# Patient Record
Sex: MALE | Race: WHITE | Employment: OTHER | URBAN - METROPOLITAN AREA
[De-identification: names, ages, dates, MRNs, and addresses within clinical notes are randomized per-mention and may not be internally consistent; named-entity substitution may affect disease eponyms.]

---

## 2020-12-12 ENCOUNTER — APPOINTMENT (OUTPATIENT)
Dept: GENERAL RADIOLOGY | Age: 70
DRG: 309 | End: 2020-12-12
Payer: COMMERCIAL

## 2020-12-12 ENCOUNTER — APPOINTMENT (OUTPATIENT)
Dept: CT IMAGING | Age: 70
DRG: 309 | End: 2020-12-12
Payer: COMMERCIAL

## 2020-12-12 ENCOUNTER — HOSPITAL ENCOUNTER (INPATIENT)
Age: 70
LOS: 2 days | Discharge: PSYCHIATRIC OR SUBSTANCE USE FACILITY | DRG: 309 | End: 2020-12-16
Attending: EMERGENCY MEDICINE | Admitting: INTERNAL MEDICINE
Payer: COMMERCIAL

## 2020-12-12 PROBLEM — R53.1 GENERALIZED WEAKNESS: Status: ACTIVE | Noted: 2020-12-12

## 2020-12-12 LAB
ALBUMIN SERPL-MCNC: 4.1 GM/DL (ref 3.4–5)
ALP BLD-CCNC: 65 IU/L (ref 40–129)
ALT SERPL-CCNC: 14 U/L (ref 10–40)
ANION GAP SERPL CALCULATED.3IONS-SCNC: 12 MMOL/L (ref 4–16)
AST SERPL-CCNC: 25 IU/L (ref 15–37)
BACTERIA: NEGATIVE /HPF
BASOPHILS ABSOLUTE: 0.1 K/CU MM
BASOPHILS RELATIVE PERCENT: 0.7 % (ref 0–1)
BILIRUB SERPL-MCNC: 0.8 MG/DL (ref 0–1)
BILIRUBIN URINE: NEGATIVE MG/DL
BLOOD, URINE: ABNORMAL
BUN BLDV-MCNC: 23 MG/DL (ref 6–23)
CALCIUM SERPL-MCNC: 8.8 MG/DL (ref 8.3–10.6)
CHLORIDE BLD-SCNC: 97 MMOL/L (ref 99–110)
CLARITY: CLEAR
CO2: 23 MMOL/L (ref 21–32)
COLOR: YELLOW
CREAT SERPL-MCNC: 1.4 MG/DL (ref 0.9–1.3)
DIFFERENTIAL TYPE: ABNORMAL
EOSINOPHILS ABSOLUTE: 0 K/CU MM
EOSINOPHILS RELATIVE PERCENT: 0.5 % (ref 0–3)
GFR AFRICAN AMERICAN: >60 ML/MIN/1.73M2
GFR NON-AFRICAN AMERICAN: 50 ML/MIN/1.73M2
GLUCOSE BLD-MCNC: 109 MG/DL (ref 70–99)
GLUCOSE, URINE: NEGATIVE MG/DL
HCT VFR BLD CALC: 37.4 % (ref 42–52)
HEMOGLOBIN: 12 GM/DL (ref 13.5–18)
IMMATURE NEUTROPHIL %: 0.4 % (ref 0–0.43)
KETONES, URINE: ABNORMAL MG/DL
LEUKOCYTE ESTERASE, URINE: NEGATIVE
LYMPHOCYTES ABSOLUTE: 1.1 K/CU MM
LYMPHOCYTES RELATIVE PERCENT: 14.9 % (ref 24–44)
MCH RBC QN AUTO: 26 PG (ref 27–31)
MCHC RBC AUTO-ENTMCNC: 32.1 % (ref 32–36)
MCV RBC AUTO: 81 FL (ref 78–100)
MONOCYTES ABSOLUTE: 0.7 K/CU MM
MONOCYTES RELATIVE PERCENT: 9.5 % (ref 0–4)
MUCUS: ABNORMAL HPF
NITRITE URINE, QUANTITATIVE: NEGATIVE
NUCLEATED RBC %: 0 %
PDW BLD-RTO: 16 % (ref 11.7–14.9)
PH, URINE: 5 (ref 5–8)
PLATELET # BLD: 231 K/CU MM (ref 140–440)
PMV BLD AUTO: 10.1 FL (ref 7.5–11.1)
POTASSIUM SERPL-SCNC: 4.2 MMOL/L (ref 3.5–5.1)
PROTEIN UA: 30 MG/DL
RBC # BLD: 4.62 M/CU MM (ref 4.6–6.2)
RBC URINE: 1 /HPF (ref 0–3)
SARS-COV-2, NAAT: NOT DETECTED
SEGMENTED NEUTROPHILS ABSOLUTE COUNT: 5.6 K/CU MM
SEGMENTED NEUTROPHILS RELATIVE PERCENT: 74 % (ref 36–66)
SODIUM BLD-SCNC: 132 MMOL/L (ref 135–145)
SOURCE: NORMAL
SPECIFIC GRAVITY UA: 1.02 (ref 1–1.03)
TOTAL IMMATURE NEUTOROPHIL: 0.03 K/CU MM
TOTAL NUCLEATED RBC: 0 K/CU MM
TOTAL PROTEIN: 7.2 GM/DL (ref 6.4–8.2)
TRICHOMONAS: ABNORMAL /HPF
TROPONIN T: <0.01 NG/ML
UROBILINOGEN, URINE: NORMAL MG/DL (ref 0.2–1)
WBC # BLD: 7.6 K/CU MM (ref 4–10.5)
WBC UA: 1 /HPF (ref 0–2)

## 2020-12-12 PROCEDURE — 93005 ELECTROCARDIOGRAM TRACING: CPT | Performed by: EMERGENCY MEDICINE

## 2020-12-12 PROCEDURE — 93005 ELECTROCARDIOGRAM TRACING: CPT | Performed by: INTERNAL MEDICINE

## 2020-12-12 PROCEDURE — G0378 HOSPITAL OBSERVATION PER HR: HCPCS

## 2020-12-12 PROCEDURE — 96361 HYDRATE IV INFUSION ADD-ON: CPT

## 2020-12-12 PROCEDURE — 81001 URINALYSIS AUTO W/SCOPE: CPT

## 2020-12-12 PROCEDURE — 96372 THER/PROPH/DIAG INJ SC/IM: CPT

## 2020-12-12 PROCEDURE — 96365 THER/PROPH/DIAG IV INF INIT: CPT

## 2020-12-12 PROCEDURE — 96376 TX/PRO/DX INJ SAME DRUG ADON: CPT

## 2020-12-12 PROCEDURE — 96360 HYDRATION IV INFUSION INIT: CPT

## 2020-12-12 PROCEDURE — 99283 EMERGENCY DEPT VISIT LOW MDM: CPT

## 2020-12-12 PROCEDURE — 2500000003 HC RX 250 WO HCPCS: Performed by: PHYSICIAN ASSISTANT

## 2020-12-12 PROCEDURE — 2580000003 HC RX 258: Performed by: EMERGENCY MEDICINE

## 2020-12-12 PROCEDURE — 6360000002 HC RX W HCPCS: Performed by: INTERNAL MEDICINE

## 2020-12-12 PROCEDURE — 51701 INSERT BLADDER CATHETER: CPT

## 2020-12-12 PROCEDURE — 85025 COMPLETE CBC W/AUTO DIFF WBC: CPT

## 2020-12-12 PROCEDURE — 80053 COMPREHEN METABOLIC PANEL: CPT

## 2020-12-12 PROCEDURE — 71045 X-RAY EXAM CHEST 1 VIEW: CPT

## 2020-12-12 PROCEDURE — 70450 CT HEAD/BRAIN W/O DYE: CPT

## 2020-12-12 PROCEDURE — 6370000000 HC RX 637 (ALT 250 FOR IP): Performed by: INTERNAL MEDICINE

## 2020-12-12 PROCEDURE — 84484 ASSAY OF TROPONIN QUANT: CPT

## 2020-12-12 PROCEDURE — 94761 N-INVAS EAR/PLS OXIMETRY MLT: CPT

## 2020-12-12 PROCEDURE — 2580000003 HC RX 258: Performed by: PHYSICIAN ASSISTANT

## 2020-12-12 PROCEDURE — U0002 COVID-19 LAB TEST NON-CDC: HCPCS

## 2020-12-12 PROCEDURE — 96375 TX/PRO/DX INJ NEW DRUG ADDON: CPT

## 2020-12-12 PROCEDURE — 2580000003 HC RX 258: Performed by: INTERNAL MEDICINE

## 2020-12-12 RX ORDER — SODIUM CHLORIDE 0.9 % (FLUSH) 0.9 %
10 SYRINGE (ML) INJECTION EVERY 12 HOURS SCHEDULED
Status: DISCONTINUED | OUTPATIENT
Start: 2020-12-12 | End: 2020-12-16 | Stop reason: HOSPADM

## 2020-12-12 RX ORDER — HYDRALAZINE HYDROCHLORIDE 20 MG/ML
10 INJECTION INTRAMUSCULAR; INTRAVENOUS EVERY 4 HOURS PRN
Status: DISCONTINUED | OUTPATIENT
Start: 2020-12-12 | End: 2020-12-12 | Stop reason: CLARIF

## 2020-12-12 RX ORDER — AMLODIPINE BESYLATE 5 MG/1
5 TABLET ORAL DAILY
Status: DISCONTINUED | OUTPATIENT
Start: 2020-12-13 | End: 2020-12-14

## 2020-12-12 RX ORDER — POTASSIUM CHLORIDE 7.45 MG/ML
10 INJECTION INTRAVENOUS PRN
Status: DISCONTINUED | OUTPATIENT
Start: 2020-12-12 | End: 2020-12-16 | Stop reason: HOSPADM

## 2020-12-12 RX ORDER — SODIUM CHLORIDE 0.9 % (FLUSH) 0.9 %
10 SYRINGE (ML) INJECTION PRN
Status: DISCONTINUED | OUTPATIENT
Start: 2020-12-12 | End: 2020-12-16 | Stop reason: HOSPADM

## 2020-12-12 RX ORDER — POTASSIUM CHLORIDE 20 MEQ/1
40 TABLET, EXTENDED RELEASE ORAL PRN
Status: DISCONTINUED | OUTPATIENT
Start: 2020-12-12 | End: 2020-12-16 | Stop reason: HOSPADM

## 2020-12-12 RX ORDER — ACETAMINOPHEN 650 MG/1
650 SUPPOSITORY RECTAL EVERY 6 HOURS PRN
Status: DISCONTINUED | OUTPATIENT
Start: 2020-12-12 | End: 2020-12-16 | Stop reason: HOSPADM

## 2020-12-12 RX ORDER — MAGNESIUM SULFATE IN WATER 40 MG/ML
2 INJECTION, SOLUTION INTRAVENOUS PRN
Status: DISCONTINUED | OUTPATIENT
Start: 2020-12-12 | End: 2020-12-16 | Stop reason: HOSPADM

## 2020-12-12 RX ORDER — POLYETHYLENE GLYCOL 3350 17 G/17G
17 POWDER, FOR SOLUTION ORAL DAILY PRN
Status: DISCONTINUED | OUTPATIENT
Start: 2020-12-12 | End: 2020-12-16 | Stop reason: HOSPADM

## 2020-12-12 RX ORDER — ACETAMINOPHEN 325 MG/1
650 TABLET ORAL EVERY 6 HOURS PRN
Status: DISCONTINUED | OUTPATIENT
Start: 2020-12-12 | End: 2020-12-16 | Stop reason: HOSPADM

## 2020-12-12 RX ORDER — ONDANSETRON 2 MG/ML
4 INJECTION INTRAMUSCULAR; INTRAVENOUS EVERY 6 HOURS PRN
Status: DISCONTINUED | OUTPATIENT
Start: 2020-12-12 | End: 2020-12-16 | Stop reason: HOSPADM

## 2020-12-12 RX ORDER — SODIUM CHLORIDE 9 MG/ML
INJECTION, SOLUTION INTRAVENOUS CONTINUOUS
Status: ACTIVE | OUTPATIENT
Start: 2020-12-12 | End: 2020-12-13

## 2020-12-12 RX ORDER — PROMETHAZINE HYDROCHLORIDE 12.5 MG/1
12.5 TABLET ORAL EVERY 6 HOURS PRN
Status: DISCONTINUED | OUTPATIENT
Start: 2020-12-12 | End: 2020-12-16 | Stop reason: HOSPADM

## 2020-12-12 RX ORDER — 0.9 % SODIUM CHLORIDE 0.9 %
500 INTRAVENOUS SOLUTION INTRAVENOUS ONCE
Status: COMPLETED | OUTPATIENT
Start: 2020-12-12 | End: 2020-12-12

## 2020-12-12 RX ADMIN — HYDRALAZINE HYDROCHLORIDE 10 MG: 20 INJECTION INTRAMUSCULAR; INTRAVENOUS at 19:18

## 2020-12-12 RX ADMIN — SODIUM CHLORIDE 500 ML: 9 INJECTION, SOLUTION INTRAVENOUS at 13:46

## 2020-12-12 RX ADMIN — METOPROLOL TARTRATE 25 MG: 25 TABLET, FILM COATED ORAL at 23:05

## 2020-12-12 RX ADMIN — SODIUM CHLORIDE: 9 INJECTION, SOLUTION INTRAVENOUS at 21:04

## 2020-12-12 RX ADMIN — METOPROLOL TARTRATE 5 MG: 5 INJECTION INTRAVENOUS at 21:50

## 2020-12-12 RX ADMIN — ENOXAPARIN SODIUM 40 MG: 40 INJECTION SUBCUTANEOUS at 21:06

## 2020-12-12 ASSESSMENT — PAIN SCALES - GENERAL
PAINLEVEL_OUTOF10: 1
PAINLEVEL_OUTOF10: 0

## 2020-12-12 NOTE — ED PROVIDER NOTES
EMERGENCY DEPARTMENT ENCOUNTER    Patient: Eleuterio Muhammad  MRN: [id number]  : 1950  Date of Evaluation: 2020  ED Provider:  Victor M Bowie    CHIEF COMPLAINT  Chief Complaint   Patient presents with   Mara LOPEZ  Eleuterio Muhammad is a 65 y.o. male who presents with generalized body aches with generalized weakness and fatigue over the last several days he also had a fall yesterday after tripping. States he hit his head on the wall but did not have any loss consciousness. Denies being on any blood thinners. States he just generally does not feel well but does not give any specific symptoms or complaints. He denies headache, denies chest pain, abdominal pain, nausea, vomiting, diarrhea, constipation, fever, shortness of breath. Denies any other associated symptoms or complaints or concerns. REVIEW OF SYSTEMS    Constitutional: negative for fever, chills  Neurological: negative for HA, focal weakness, loss of sensation  Ophthalmic: negative for vision change  ENT: negative for congestion, rhinorrhea  Cardiovascular: negative for chest pain  Respiratory: negative for SOB, cough  GI: negative for abdominal pain, nausea, vomiting, diarrhea, constipation  : negative for dysuria, hematuria  Musculoskeletal: negative for decreased ROM, joint swelling  Dermatological: negative for rash, wounds  Heme: Negative for bleeding, bruising      PAST MEDICAL HISTORY  History reviewed. No pertinent past medical history. CURRENT MEDICATIONS  [unfilled]    ALLERGIES  No Known Allergies    SURGICAL HISTORY  History reviewed. No pertinent surgical history. FAMILY HISTORY  History reviewed. No pertinent family history.     SOCIAL HISTORY  Social History     Socioeconomic History    Marital status:      Spouse name: None    Number of children: None    Years of education: None    Highest education level: None   Occupational History    None   Social Needs    Financial resource strain: None    Food insecurity     Worry: None     Inability: None    Transportation needs     Medical: None     Non-medical: None   Tobacco Use    Smoking status: Never Smoker    Smokeless tobacco: Never Used   Substance and Sexual Activity    Alcohol use: None    Drug use: None    Sexual activity: None   Lifestyle    Physical activity     Days per week: None     Minutes per session: None    Stress: None   Relationships    Social connections     Talks on phone: None     Gets together: None     Attends Anglican service: None     Active member of club or organization: None     Attends meetings of clubs or organizations: None     Relationship status: None    Intimate partner violence     Fear of current or ex partner: None     Emotionally abused: None     Physically abused: None     Forced sexual activity: None   Other Topics Concern    None   Social History Narrative    None         **Past medical, family and social histories, and nursing notes reviewed and verified by me**      PHYSICAL EXAM  VITAL SIGNS:   ED Triage Vitals [12/12/20 1145]   Enc Vitals Group      BP (!) 176/105      Pulse 66      Resp 19      Temp 98.2 °F (36.8 °C)      Temp Source Oral      SpO2 96 %      Weight 150 lb (68 kg)      Height 5' 10\" (1.778 m)      Head Circumference       Peak Flow       Pain Score       Pain Loc       Pain Edu? Excl. in [address]? Vitals during ED course were reviewed and are as charted.     Constitutional: Minimal distress, Non-toxic appearance  Eyes: Conjunctiva normal, No discharge, PERRL, EOMI  HENT: Normocephalic, Atraumatic, bilateral external ears normal, posterior oropharynx is nonerythematous and without exudate, uvula is midline, no trismus, no \"hot potato voice\" or dysphonia, oropharynx moist  Neck: Supple, no midline cervical spinal tenderness palpation or palpable deformities, no stridor, no grossly visible or palpable masses  Cardiovascular: Regular rate and rhythm, No murmurs, No rubs, No gallops  Pulmonary/Chest: Normal breath sounds, No respiratory distress or accessory muscle use, No wheezing, crackles or rhonchi. Abdomen: Soft, nondistended and nonrigid, No tenderness or peritoneal signs, No masses, normal bowel sounds  Back: No midline point tenderness, No paraspinous muscle tenderness.  No CVA tenderness  Extremities: No gross deformities, no edema, no tenderness  Neurologic: Cranial nerves II through XII grossly intact as tested, normal motor function, Normal sensory function, No focal deficits  Skin: Warm, Dry, No erythema, No rash, No cyanosis, No mottling  Lymphatic: No lymphadenopathy in the following location(s): cervical  Psychiatric: Alert and oriented x3, Affect normal              RADIOLOGY/PROCEDURES/LABS/MEDICATIONS ADMINISTERED:    I have reviewed and interpreted all of the currently available lab results from this visit (if applicable):  Results for orders placed or performed during the hospital encounter of 12/12/20   CBC Auto Differential   Result Value Ref Range    WBC 7.6 4.0 - 10.5 K/CU MM    RBC 4.62 4.6 - 6.2 M/CU MM    Hemoglobin 12.0 (L) 13.5 - 18.0 GM/DL    Hematocrit 37.4 (L) 42 - 52 %    MCV 81.0 78 - 100 FL    MCH 26.0 (L) 27 - 31 PG    MCHC 32.1 32.0 - 36.0 %    RDW 16.0 (H) 11.7 - 14.9 %    Platelets 779 450 - 408 K/CU MM    MPV 10.1 7.5 - 11.1 FL    Differential Type AUTOMATED DIFFERENTIAL     Segs Relative 74.0 (H) 36 - 66 %    Lymphocytes % 14.9 (L) 24 - 44 %    Monocytes % 9.5 (H) 0 - 4 %    Eosinophils % 0.5 0 - 3 %    Basophils % 0.7 0 - 1 %    Segs Absolute 5.6 K/CU MM    Lymphocytes Absolute 1.1 K/CU MM    Monocytes Absolute 0.7 K/CU MM    Eosinophils Absolute 0.0 K/CU MM    Basophils Absolute 0.1 K/CU MM    Nucleated RBC % 0.0 %    Total Nucleated RBC 0.0 K/CU MM    Total Immature Neutrophil 0.03 K/CU MM    Immature Neutrophil % 0.4 0 - 0.43 %   Comprehensive Metabolic Panel w/ Reflex to MG   Result Value Ref Range    Sodium 132 (L) 135 - 145 MMOL/L Non-African American 60 (L) >60 mL/min/1.73m2    GFR African American >60 >60 mL/min/1.73m2    Anion Gap 11 4 - 16   CBC auto differential   Result Value Ref Range    WBC 8.8 4.0 - 10.5 K/CU MM    RBC 4.21 (L) 4.6 - 6.2 M/CU MM    Hemoglobin 10.6 (L) 13.5 - 18.0 GM/DL    Hematocrit 34.3 (L) 42 - 52 %    MCV 81.5 78 - 100 FL    MCH 25.2 (L) 27 - 31 PG    MCHC 30.9 (L) 32.0 - 36.0 %    RDW 16.1 (H) 11.7 - 14.9 %    Platelets 766 649 - 671 K/CU MM    MPV 10.0 7.5 - 11.1 FL    Differential Type AUTOMATED DIFFERENTIAL     Segs Relative 73.1 (H) 36 - 66 %    Lymphocytes % 15.9 (L) 24 - 44 %    Monocytes % 9.4 (H) 0 - 4 %    Eosinophils % 0.7 0 - 3 %    Basophils % 0.6 0 - 1 %    Segs Absolute 6.5 K/CU MM    Lymphocytes Absolute 1.4 K/CU MM    Monocytes Absolute 0.8 K/CU MM    Eosinophils Absolute 0.1 K/CU MM    Basophils Absolute 0.1 K/CU MM    Nucleated RBC % 0.0 %    Total Nucleated RBC 0.0 K/CU MM    Total Immature Neutrophil 0.03 K/CU MM    Immature Neutrophil % 0.3 0 - 0.43 %   TSH without Reflex   Result Value Ref Range    TSH, High Sensitivity 0.327 0.270 - 4.20 uIu/ml   EKG 12 Lead   Result Value Ref Range    Ventricular Rate 84 BPM    Atrial Rate 92 BPM    QRS Duration 86 ms    Q-T Interval 374 ms    QTc Calculation (Bazett) 441 ms    R Axis -6 degrees    T Axis 44 degrees    Diagnosis       Atrial fibrillation  Abnormal ECG  No previous ECGs available  Confirmed by Meryl Gale MD ([id number]) on 12/13/2020 2:08:42 PM     EKG 12 Lead   Result Value Ref Range    Ventricular Rate 88 BPM    Atrial Rate 288 BPM    QRS Duration 88 ms    Q-T Interval 364 ms    QTc Calculation (Bazett) 440 ms    R Axis -9 degrees    T Axis 37 degrees    Diagnosis       Atrial fibrillation  Abnormal ECG  When compared with ECG of 12-DEC-2020 13:17,  No significant change was found  Confirmed by Anisa Gale MDine Ditch [id number]) on 12/13/2020 2:09:59 PM            ABNORMAL LABS:  Labs Reviewed   CBC WITH AUTO DIFFERENTIAL - Abnormal; Notable for the following components:       Result Value    Hemoglobin 12.0 (*)     Hematocrit 37.4 (*)     MCH 26.0 (*)     RDW 16.0 (*)     Segs Relative 74.0 (*)     Lymphocytes % 14.9 (*)     Monocytes % 9.5 (*)     All other components within normal limits   COMPREHENSIVE METABOLIC PANEL W/ REFLEX TO MG FOR LOW K - Abnormal; Notable for the following components:    Sodium 132 (*)     Chloride 97 (*)     CREATININE 1.4 (*)     Glucose 109 (*)     GFR Non- 50 (*)     All other components within normal limits   URINE RT REFLEX TO CULTURE - Abnormal; Notable for the following components:    Ketones, Urine SMALL (*)     Blood, Urine SMALL (*)     Protein, UA 30 (*)     Mucus, UA RARE (*)     All other components within normal limits   COMPREHENSIVE METABOLIC PANEL W/ REFLEX TO MG FOR LOW K - Abnormal; Notable for the following components:    Calcium 8.1 (*)     Total Protein 5.9 (*)     GFR Non- 60 (*)     All other components within normal limits   CBC WITH AUTO DIFFERENTIAL - Abnormal; Notable for the following components:    RBC 4.21 (*)     Hemoglobin 10.6 (*)     Hematocrit 34.3 (*)     MCH 25.2 (*)     MCHC 30.9 (*)     RDW 16.1 (*)     Segs Relative 73.1 (*)     Lymphocytes % 15.9 (*)     Monocytes % 9.4 (*)     All other components within normal limits   TROPONIN   COVID-19   TSH WITHOUT REFLEX   TROPONIN         IMAGING STUDIES ORDERED:  CT HEAD WO CONTRAST  XR CHEST PORTABLE  STRAIGHT CATH  AMBULATE PATIENT  VITAL SIGNS  VITAL SIGNS  TELEMETRY MONITORING  IP CONSULT TO HOSPITALIST  IP CONSULT TO PSYCHIATRY  IP CONSULT TO CARDIOLOGY  PATIENT STATUS (FROM ED OR OR/PROCEDURAL)  REASON FOR NO MECHANICAL VTE PROPHYLAXIS  FULL CODE  DIET GENERAL  OT EVAL AND TREAT  PT EVAL AND TREAT  ACTIVITY AS TOLERATED    CT Head WO Contrast   Final Result   No acute intracranial abnormality. Old lacunar infarct in the right head of caudate nucleus.       Mild parenchymal volume loss.      Mild chronic microvascular disease. 2 mm calcific nodule at the anterior aspect of the 3rd ventricle, likely   secondary to calcification related to choroid plexus versus calcified colloid   cyst.  No hydrocephalus.          XR CHEST PORTABLE   Final Result   Cardiomegaly, otherwise, no acute abnormalities seen in the chest               MEDICATIONS ADMINISTERED:  Medications   sodium chloride flush 0.9 % injection 10 mL (0 mLs Intravenous Held 12/13/20 2146)   sodium chloride flush 0.9 % injection 10 mL (has no administration in time range)   enoxaparin (LOVENOX) injection 40 mg (40 mg Subcutaneous Given 12/13/20 0920)   promethazine (PHENERGAN) tablet 12.5 mg (has no administration in time range)     Or   ondansetron (ZOFRAN) injection 4 mg (has no administration in time range)   polyethylene glycol (GLYCOLAX) packet 17 g (has no administration in time range)   acetaminophen (TYLENOL) tablet 650 mg (has no administration in time range)     Or   acetaminophen (TYLENOL) suppository 650 mg (has no administration in time range)   0.9 % sodium chloride infusion ( Intravenous Paused 12/13/20 1854)   potassium chloride (KLOR-CON M) extended release tablet 40 mEq (has no administration in time range)     Or   potassium bicarb-citric acid (EFFER-K) effervescent tablet 40 mEq (has no administration in time range)     Or   potassium chloride 10 mEq/100 mL IVPB (Peripheral Line) (has no administration in time range)   magnesium sulfate 2 g in 50 mL IVPB premix (has no administration in time range)   hydrALAZINE (APRESOLINE) 10 mg in sodium chloride 0.9 % 50 mL ivpb (0 mg Intravenous Stopped 12/13/20 0057)   metoprolol tartrate (LOPRESSOR) tablet 25 mg (25 mg Oral Given 12/13/20 2146)   amLODIPine (NORVASC) tablet 5 mg (5 mg Oral Given 12/13/20 0920)   0.9 % sodium chloride bolus (0 mLs Intravenous Stopped 12/12/20 1552)   metoprolol (LOPRESSOR) 5 mg in sodium chloride 0.9 % 50 mL IVPB (0 mg Intravenous Stopped 12/12/20 2220)         COURSE & MEDICAL DECISION MAKING  Last vitals: BP (!) 169/78   Pulse 76   Temp 98.3 °F (36.8 °C) (Oral)   Resp 18   Ht 5' 10\" (1.778 m)   Wt 179 lb (81.2 kg)   SpO2 98%   BMI 25.68 kg/m²     65year-old male generalized weakness and fall. No neurological deficits. CT scan of the head was obtained and is without any acute abnormalities. Laboratory work-up thus far has been rather unremarkable. Urinalysis still pending. Patient signed out to the evening attending, Dr. Stef Mott who will follow up on this and determine final disposition. Clinical Impression:  1. Generalized weakness    2. Fall, initial encounter    3. Closed head injury, initial encounter    4. Essential hypertension    5. Tachycardia        Disposition referral (if applicable):  No follow-up provider specified. Disposition medications (if applicable): There are no discharge medications for this patient. ED Provider Disposition Time  DISPOSITION            Electronically signed by: Wili Pritchett M.D., 12/13/2020 11:49 PM      This dictation was created with voice recognition software. While attempts have been made to review the dictation as it is transcribed, on occasion the spoken word can be misinterpreted by the technology leading to omissions or inappropriate words, phrases or sentences.         Rosalie Bennett MD  12/13/20 [id number]

## 2020-12-12 NOTE — ED TRIAGE NOTES
Pt presents to the ED after falling yesterday. Pt states he hit his head. Denies any LOC or blood thinning medications.

## 2020-12-12 NOTE — H&P
[facility] History & Physical      Name: Charles Carreno  PCP: No primary care provider on file. Date of Admission: 12/12/2020    Date of Service: Pt seen/examined on 12/12/2020     Chief Complaint:  Fall    History Of Present Illness:      Charles Carreno is a 65 y.o. male that  has no past medical history on file., who presented to ED with the above chief complaint. Patient noted a fall yesterday, denied any symptoms of chest pain , SOB prior to this. Patient is poor historian, kept saying though interview\" I am going to die, what is the point of all these? \" kept saying death is near , would not say why he felt so. He denies any suicidal ideation or homicidal ideation. Patient notes has been very generalized weakness and fatigue in the past couple of days, denies an fever or chills, no chest pain, palpitations , dizziness, cough or shortness of breath, no nausea, vomiting or urinary symptoms. ED course summary:   The case was discussed with the ED provider  Allergies:  Patient has no known allergies. Medications Prior to Admission Reviewed:    Prior to Admission medications    Not on File       Past Medical History Reviewed:    No known past medical history  Past Surgical History Reviewed:    Patient  has no past surgical history on file. Family History:  Reviewed in detail. Positive as follows:  History reviewed. No pertinent family history. Social History:  The patient  reports that he has never smoked. He has never used smokeless tobacco.  TOBACCO:   reports that he has never smoked. He has never used smokeless tobacco.  ETOH:  No alcohol use  Drugs: Denies recreational drug use    ROS:  At least 10-point review of systems reviewed and were negative except as stated above in HPI. Physical Exam:  Vitals:  BP (!) 208/100   Pulse 109   Temp 98.2 °F (36.8 °C) (Oral)   Resp 17   Ht 5' 10\" (1.778 m)   Wt 150 lb (68 kg)   SpO2 96%   BMI 21.52 kg/m²   General: well appearing, NAD. normalbody habitus. Eyes: EDITA. Normal conjunctiva. extraocular motors are intact, sclerae white, no injection, no icterus. ENT/Mouth: Normal appearing jaw and neck, no cervical lymphadenopathy or sinus tenderness. Clear oropharynx with moist mucous membrane. Cardiovascular:  normal rate, regular rhythm, normal S1, S2, no murmurs, rubs, clicks or gallops. There is no tenderness to palpation over the chest wall or over ribs. No peripheral edema. Pedal pulses 2+ bilaterally. Respiratory:CTA, no wheezes, rales or rhonchi, symmetric air movement. There is no use of accessory muscles no nasal flaring identified. Gastrointestinal: Soft, nontender, nondistended, no masses or organomegaly. Suprapubic: there is no tenderness to palpation over the external bladder    Genitourinary:  No CVA tenderness. Musculoskletal: No joint swelling, warmth, or tenderness. 5 out of 5 strength in all 4 extremities. No obvious muscle atrophy is noted. No focal muscle deficits are appreciated  Skin: Warm, Dry, Normal coloration and turgor, no rashes, no suspicious skin lesions noted. Neurologic: Normal speech, No focal findings or movement disorder noted. No evidence of incontinence or loss of bowel or bladder.   Mental status:  Level of consciousness is normal. Alert, Oriented x3, Coherent, Appropriate affect, No agitation. Hematologic/Lymphatic: No cervical lymphadenopathy. Data  (4-points for high level)  Laboratory this visit:  Reviewed  Recent Labs     12/12/20  1330   WBC 7.6   HGB 12.0*   HCT 37.4*         Recent Labs     12/12/20  1330   *   K 4.2   CL 97*   CO2 23   BUN 23   CREATININE 1.4*     Recent Labs     12/12/20  1330   AST 25   ALT 14   BILITOT 0.8   ALKPHOS 65     No results for input(s): INR in the last 72 hours. No results for input(s): CKTOTAL, CKMB, CKMBINDEX in the last 72 hours. Invalid input(s): Maci Monday input(s): PRO-BNP      Radiology this visit:  Reviewed.     Ct Head Wo Contrast    Result Date: 12/12/2020  EXAMINATION: CT OF THE HEAD WITHOUT CONTRAST  12/12/2020 2:34 pm TECHNIQUE: CT of the head was performed without the administration of intravenous contrast. Dose modulation, iterative reconstruction, and/or weight based adjustment of the mA/kV was utilized to reduce the radiation dose to as low as reasonably achievable. COMPARISON: None. HISTORY: ORDERING SYSTEM PROVIDED HISTORY: fall, head injury TECHNOLOGIST PROVIDED HISTORY: Reason for exam:->fall, head injury Has a \"code stroke\" or \"stroke alert\" been called? ->No Reason for Exam: fall, head injury, AMS Acuity: Acute Type of Exam: Initial FINDINGS: BRAIN/VENTRICLES: There is mild parenchymal volume loss. There is periventricular white matter low attenuation, likely related to mild chronic microvascular disease. There is no acute intracranial hemorrhage, mass effect or midline shift. No abnormal extra-axial fluid collection. There is old lacunar infarct in the right head of caudate nucleus. The gray-white differentiation is maintained without evidence of an acute infarct. There is 2 mm calcific nodule at the anterior aspect of the 3rd ventricle, likely related to calcification related to choroid plexus versus calcified colloid cyst.  No hydrocephalus. ORBITS: The visualized portion of the orbits demonstrate no acute abnormality. SINUSES: The visualized paranasal sinuses and mastoid air cells demonstrate no acute abnormality. SOFT TISSUES/SKULL:  No acute abnormality of the visualized skull or soft tissues. No acute intracranial abnormality. Old lacunar infarct in the right head of caudate nucleus. Mild parenchymal volume loss. Mild chronic microvascular disease. 2 mm calcific nodule at the anterior aspect of the 3rd ventricle, likely secondary to calcification related to choroid plexus versus calcified colloid cyst.  No hydrocephalus.      Xr Chest Portable    Result Date: 12/12/2020  EXAMINATION: ONE XRAY VIEW OF THE CHEST 12/12/2020 1:04 pm COMPARISON: None. HISTORY: ORDERING SYSTEM PROVIDED HISTORY: fall, generalized weakness TECHNOLOGIST PROVIDED HISTORY: Reason for exam:->fall, generalized weakness Reason for Exam: fall, generalized weakness Acuity: Acute Type of Exam: Initial FINDINGS: Cardiomegaly. No acute airspace disease. No pulmonary vascular congestion or edema. No pneumothorax. No pleural effusion. Cardiomegaly, otherwise, no acute abnormalities seen in the chest       EKG this visit:  Reviewed   EKG: Afib, HR -88    Documents: Recent previous records, labs, imaging in the EMR were reviewed. Assessments and Plans:  Present on Admission:   Generalized weakness    · Generalised weakness  -Work up so far negative  -Labs unremarkable, mild hypokalemia and GENNA  -CT head negative  -Chest xray negative  -UA negative  -Covid negative    Plan   -Admit to telemetry  -PT OT    · Hypertensive urgency  -Elevated BP, not noted to be hypertensive  -Not on home meds  -Start on hydralazine PRN  -Start on metoprolol and amlodipine , titrate as needed    · ?? New onset Afib, slow ventricular response  -EKG afib  -Order echo, start on metoprolol  -Order TSH    · GENNA  · Hyponatremia, likely hypovolemic  -Hydrate, monitor    · ?? Depression  -Consult psych      DVT Prophylaxis: Enoxaparin  Code Status Full, will revaluate , not sure state of mind patient is in.       Lavina Lesch, MD [facility]  Office Phone: [phone]

## 2020-12-12 NOTE — CARE COORDINATION
LSW completed chart review. This pt here for fall which occurred yesterday. Pt did have BP reading of 176/105. Pt has also been tested for Covid.      LSW spoke to this pt from door wearing mask/PPE and explained CM role. Pt reports he lives alone w/in a 1 story apartment- for last 34 yrs. Of note, this is pt's 1st visit to [facility]. Pt notes he is  and does not drive. Pt explains his brother Jessica Dasilva ([email or web address]) brought him here. Pt does not have a PCP and states he does not go to the doctor. Pt not interested in PCP list but LSW provided this to pt along w/# for [facility] transportation for medical appointments. Pt has insurance and takes no medication. Pt has no DME. LSW completed ACP w/pt. Pt states he is ready to die and feels he is close to death- no reason given. LSW also stressed importance for pt to secure PCP so he can have a DNR order written as this is his wish. Pt was very clear he wants no Resuscitation or Ventilation. Patient ambulated down cope without difficulty. EKG shows new onset of A-fib, otherwise labs & XR non-significant, which includes CT head. Pt is being admitted- LSW did send sticky note to hospitalist that pt wants to be DNR.

## 2020-12-12 NOTE — ACP (ADVANCE CARE PLANNING)
Advance Care Planning     Advance Care Planning Activator (Inpatient)  Conversation Note      Date of ACP Conversation: 12/12/2020    Conversation Conducted with: Patient with Decision Making Capacity    ACP Activator: Anamaria Ford    *When Decision Maker makes decisions on behalf of the incapacitated patient: Decision Maker is asked to consider and make decisions based on patient values, known preferences, or best interests. Health Care Decision Maker:     Current Designated Health Care Decision Maker:     (If there is a valid [address] named in the [address] [place] [address] Makers\" box in the ACP activity, but it is not visible above, be sure to open that field and then select the health care decision maker relationship (ie \"primary\") in the blank space to the right of the name.) Validate  this information as still accurate & up-to-date; edit [address] field as needed.)    Note: Assess and validate information in current ACP documents, as indicated. If no Decision Maker listed above or available through scanned documents, then:    If no Authorized Decision Maker has previously been identified, then patient chooses [address]:  \"Who would you like to name as your primary health care decision-maker? \"               Name: Shraddha Key  Relationship: Brother  Phone number: [phone]  Valeta Ala this person be reached easily? \" No     \"Who would you like to name as your back-up decision maker? \"   Name: Matilde Edwards Relationship: brother Phone number: [phone]  Valeta Ala this person be reached easily? \" No    LSW noted importance for pt to notify the hospital of # for both of above contacts asap. Note: If the relationship of these Decision-Makers to the patient does NOT follow your state's Next of Kin hierarchy, recommend that patient complete ACP document that meets state-specific requirements to allow them to act on the patient's behalf when appropriate.     Care Preferences    Ventilation: \"If you were in your present state of health and suddenly became very ill and were unable to breathe on your own, what would your preference be about the use of a ventilator (breathing machine) if it were available to you? \"      Would the patient desire the use of ventilator (breathing machine)?: no    \"If your health worsens and it becomes clear that your chance of recovery is unlikely, what would your preference be about the use of a ventilator (breathing machine) if it were available to you? \"     Would the patient desire the use of ventilator (breathing machine)?: No      Resuscitation  \"CPR works best to restart the heart when there is a sudden event, like a heart attack, in someone who is otherwise healthy. Unfortunately, CPR does not typically restart the heart for people who have serious health conditions or who are very sick. \"    \"In the event your heart stopped as a result of an underlying serious health condition, would you want attempts to be made to restart your heart (answer \"yes\" for attempt to resuscitate) or would you prefer a natural death (answer \"no\" for do not attempt to resuscitate)? \" no      NOTE: If the patient has a valid advance directive AND now provides care preference(s) that are inconsistent with that prior directive, advise the patient to consider either: creating a new advance directive that complies with state-specific requirements; or, if that is not possible, orally revoking that prior directive in accordance with state-specific requirements, which must be documented in the EHR. [] Yes   [] No   Educated Patient / Obed Taylor regarding differences between Advance Directives and portable DNR orders.     Length of ACP Conversation in minutes:  13    Conversation Outcomes:  [x] ACP discussion completed  [] Existing advance directive reviewed with patient; no changes to patient's previously recorded wishes  [] New Advance Directive completed  [] Portable Do Not Rescitate prepared for Provider review and signature  [] POLST/POST/MOLST/MOST prepared for Provider review and signature      Follow-up plan:    [] Schedule follow-up conversation to continue planning  [] Referred individual to Provider for additional questions/concerns   [] Advised patient/agent/surrogate to review completed ACP document and update if needed with changes in condition, patient preferences or care setting    [] This note routed to one or more involved healthcare providers      LSW did talk to pt about a POA and LW and explained either of these could be completed here in hospital at this time w/no cost. Pt did not prefer to have either of these completed @ this time. Pt chose his brother's (listed above) as his Primary and secondary decision makers. Pt does have other brothers and LSW explained [place] law specifies the primary decision maker in the following descending order for priority:  Guardian  Spouse  [de-identified] of adult Children  Parents  [de-identified] of adult Siblings  Nearest Relative not described above    Pt states he has no children, is  and his parents and only sister are . Still, he does not want to fill out a POA. LSW explained that if there are multiple children siblings, again there needs to be a majority of consensus for decision to be made and pt voiced understanding. LSW also stressed importance for pt to secure PCP so he can have a DNR order written as this is his wish. Pt was very clear he wants no Resuscitation or Ventilation.

## 2020-12-13 PROBLEM — F33.9 RECURRENT MAJOR DEPRESSIVE DISORDER (HCC): Chronic | Status: ACTIVE | Noted: 2020-12-13

## 2020-12-13 PROBLEM — F33.9 RECURRENT MAJOR DEPRESSIVE DISORDER (HCC): Status: ACTIVE | Noted: 2020-12-13

## 2020-12-13 LAB
ALBUMIN SERPL-MCNC: 3.7 GM/DL (ref 3.4–5)
ALP BLD-CCNC: 56 IU/L (ref 40–128)
ALT SERPL-CCNC: 12 U/L (ref 10–40)
ANION GAP SERPL CALCULATED.3IONS-SCNC: 11 MMOL/L (ref 4–16)
AST SERPL-CCNC: 23 IU/L (ref 15–37)
BASOPHILS ABSOLUTE: 0.1 K/CU MM
BASOPHILS RELATIVE PERCENT: 0.6 % (ref 0–1)
BILIRUB SERPL-MCNC: 0.9 MG/DL (ref 0–1)
BUN BLDV-MCNC: 20 MG/DL (ref 6–23)
CALCIUM SERPL-MCNC: 8.1 MG/DL (ref 8.3–10.6)
CHLORIDE BLD-SCNC: 103 MMOL/L (ref 99–110)
CO2: 22 MMOL/L (ref 21–32)
CREAT SERPL-MCNC: 1.2 MG/DL (ref 0.9–1.3)
DIFFERENTIAL TYPE: ABNORMAL
EKG ATRIAL RATE: 288 BPM
EKG ATRIAL RATE: 92 BPM
EKG DIAGNOSIS: NORMAL
EKG DIAGNOSIS: NORMAL
EKG Q-T INTERVAL: 364 MS
EKG Q-T INTERVAL: 374 MS
EKG QRS DURATION: 86 MS
EKG QRS DURATION: 88 MS
EKG QTC CALCULATION (BAZETT): 440 MS
EKG QTC CALCULATION (BAZETT): 441 MS
EKG R AXIS: -6 DEGREES
EKG R AXIS: -9 DEGREES
EKG T AXIS: 37 DEGREES
EKG T AXIS: 44 DEGREES
EKG VENTRICULAR RATE: 84 BPM
EKG VENTRICULAR RATE: 88 BPM
EOSINOPHILS ABSOLUTE: 0.1 K/CU MM
EOSINOPHILS RELATIVE PERCENT: 0.7 % (ref 0–3)
GFR AFRICAN AMERICAN: >60 ML/MIN/1.73M2
GFR NON-AFRICAN AMERICAN: 60 ML/MIN/1.73M2
GLUCOSE BLD-MCNC: 82 MG/DL (ref 70–99)
HCT VFR BLD CALC: 34.3 % (ref 42–52)
HEMOGLOBIN: 10.6 GM/DL (ref 13.5–18)
IMMATURE NEUTROPHIL %: 0.3 % (ref 0–0.43)
LYMPHOCYTES ABSOLUTE: 1.4 K/CU MM
LYMPHOCYTES RELATIVE PERCENT: 15.9 % (ref 24–44)
MCH RBC QN AUTO: 25.2 PG (ref 27–31)
MCHC RBC AUTO-ENTMCNC: 30.9 % (ref 32–36)
MCV RBC AUTO: 81.5 FL (ref 78–100)
MONOCYTES ABSOLUTE: 0.8 K/CU MM
MONOCYTES RELATIVE PERCENT: 9.4 % (ref 0–4)
NUCLEATED RBC %: 0 %
PDW BLD-RTO: 16.1 % (ref 11.7–14.9)
PLATELET # BLD: 198 K/CU MM (ref 140–440)
PMV BLD AUTO: 10 FL (ref 7.5–11.1)
POTASSIUM SERPL-SCNC: 3.9 MMOL/L (ref 3.5–5.1)
RBC # BLD: 4.21 M/CU MM (ref 4.6–6.2)
SEGMENTED NEUTROPHILS ABSOLUTE COUNT: 6.5 K/CU MM
SEGMENTED NEUTROPHILS RELATIVE PERCENT: 73.1 % (ref 36–66)
SODIUM BLD-SCNC: 136 MMOL/L (ref 135–145)
TOTAL IMMATURE NEUTOROPHIL: 0.03 K/CU MM
TOTAL NUCLEATED RBC: 0 K/CU MM
TOTAL PROTEIN: 5.9 GM/DL (ref 6.4–8.2)
TSH HIGH SENSITIVITY: 0.33 UIU/ML (ref 0.27–4.2)
WBC # BLD: 8.8 K/CU MM (ref 4–10.5)

## 2020-12-13 PROCEDURE — 97535 SELF CARE MNGMENT TRAINING: CPT

## 2020-12-13 PROCEDURE — 93010 ELECTROCARDIOGRAM REPORT: CPT | Performed by: INTERNAL MEDICINE

## 2020-12-13 PROCEDURE — 2580000003 HC RX 258: Performed by: INTERNAL MEDICINE

## 2020-12-13 PROCEDURE — 84443 ASSAY THYROID STIM HORMONE: CPT

## 2020-12-13 PROCEDURE — 6360000002 HC RX W HCPCS: Performed by: INTERNAL MEDICINE

## 2020-12-13 PROCEDURE — 80053 COMPREHEN METABOLIC PANEL: CPT

## 2020-12-13 PROCEDURE — G0378 HOSPITAL OBSERVATION PER HR: HCPCS

## 2020-12-13 PROCEDURE — 94761 N-INVAS EAR/PLS OXIMETRY MLT: CPT

## 2020-12-13 PROCEDURE — 36415 COLL VENOUS BLD VENIPUNCTURE: CPT

## 2020-12-13 PROCEDURE — 96372 THER/PROPH/DIAG INJ SC/IM: CPT

## 2020-12-13 PROCEDURE — 6370000000 HC RX 637 (ALT 250 FOR IP): Performed by: INTERNAL MEDICINE

## 2020-12-13 PROCEDURE — 96361 HYDRATE IV INFUSION ADD-ON: CPT

## 2020-12-13 PROCEDURE — 96367 TX/PROPH/DG ADDL SEQ IV INF: CPT

## 2020-12-13 PROCEDURE — 97166 OT EVAL MOD COMPLEX 45 MIN: CPT

## 2020-12-13 PROCEDURE — 99221 1ST HOSP IP/OBS SF/LOW 40: CPT | Performed by: NURSE PRACTITIONER

## 2020-12-13 PROCEDURE — 84484 ASSAY OF TROPONIN QUANT: CPT

## 2020-12-13 PROCEDURE — 85025 COMPLETE CBC W/AUTO DIFF WBC: CPT

## 2020-12-13 PROCEDURE — 97116 GAIT TRAINING THERAPY: CPT

## 2020-12-13 PROCEDURE — 97162 PT EVAL MOD COMPLEX 30 MIN: CPT

## 2020-12-13 RX ADMIN — METOPROLOL TARTRATE 25 MG: 25 TABLET, FILM COATED ORAL at 09:19

## 2020-12-13 RX ADMIN — METOPROLOL TARTRATE 25 MG: 25 TABLET, FILM COATED ORAL at 21:46

## 2020-12-13 RX ADMIN — SODIUM CHLORIDE, PRESERVATIVE FREE 10 ML: 5 INJECTION INTRAVENOUS at 09:21

## 2020-12-13 RX ADMIN — ENOXAPARIN SODIUM 40 MG: 40 INJECTION SUBCUTANEOUS at 09:20

## 2020-12-13 RX ADMIN — HYDRALAZINE HYDROCHLORIDE 10 MG: 20 INJECTION INTRAMUSCULAR; INTRAVENOUS at 00:27

## 2020-12-13 RX ADMIN — SODIUM CHLORIDE: 9 INJECTION, SOLUTION INTRAVENOUS at 13:08

## 2020-12-13 RX ADMIN — AMLODIPINE BESYLATE 5 MG: 5 TABLET ORAL at 09:20

## 2020-12-13 ASSESSMENT — PAIN SCALES - GENERAL: PAINLEVEL_OUTOF10: 0

## 2020-12-13 NOTE — CONSULTS
Initial Psychiatric History and Physical    Luca Ferguson  [id number]  12/12/2020 12/13/20    ID: Patient is a 65 yrs y.o. male    CC: \"I don't know. \"    HPI: Patient noted a fall yesterday, denied any symptoms of chest pain, SOB prior to this. Patient is poor historian, kept saying though interview  \"I am going to die, what is the point of all these? \" kept saying death is near, would not say why he felt so. He denies any suicidal ideation or homicidal ideation. Patient notes there has been very generalized weakness and fatigue in the past couple of days, denies an fever or chills, no chest pain, palpitations , dizziness, cough or shortness of breath, no nausea, vomiting or urinary symptoms. Psychiatry consulted to assess for depression. Today's interview was conducted using the latakoo audiovisual machine with the interviewer located at [place] [address] and the interviewee located at [place] [address]. During today's interview, the patient was alert & oriented x 3. He denies SI/HI. He endorses visual hallucinations of figures but is not able to be more specific. He denies auditory hallucinations. He denies depression. He endorses anxiety which he rates as \"5\" on a scale of 0 to 10 with 0 being none and 10 being horrible. Mood appears to be somewhat elevated. States he has been sleeping \"OK. \" Notes that his appetite is poor. States he was diagnosed with Paranoid Schizophrenia and Bipolar disorder in the past. Notes that he attempted suicide by overdose late 1960s-early 1970s. States that he was hospitalized at [facility] in [place] at that time for one month due to [place] nervous breakdown. \"  Patient states that he once \"shook Reji Bahena's hand\" Pt was polite and cordial during the interview process.       Due to patient being a poor historian, collateral information was obtained from his brother Sharon Leavitt ([phone]) who stated that NEGATIVE NEGATIVE    RBC, UA 1 0 - 3 /HPF    WBC, UA 1 0 - 2 /HPF    Bacteria, UA NEGATIVE NEGATIVE /HPF    Mucus, UA RARE (A) NEGATIVE HPF    Trichomonas, UA NONE SEEN NONE SEEN /HPF   EKG 12 Lead    Collection Time: 12/12/20  9:18 PM   Result Value Ref Range    Ventricular Rate 88 BPM    Atrial Rate 288 BPM    QRS Duration 88 ms    Q-T Interval 364 ms    QTc Calculation (Bazett) 440 ms    R Axis -9 degrees    T Axis 37 degrees    Diagnosis       Atrial fibrillation  Abnormal ECG  When compared with ECG of 12-DEC-2020 13:17,  No significant change was found  Confirmed by Aiden Whitlock MD, Kalie Osuna ([id number]) on 12/13/2020 2:09:59 PM     Comprehensive Metabolic Panel w/ Reflex to MG    Collection Time: 12/13/20  4:26 AM   Result Value Ref Range    Sodium 136 135 - 145 MMOL/L    Potassium 3.9 3.5 - 5.1 MMOL/L    Chloride 103 99 - 110 mMol/L    CO2 22 21 - 32 MMOL/L    BUN 20 6 - 23 MG/DL    CREATININE 1.2 0.9 - 1.3 MG/DL    Glucose 82 70 - 99 MG/DL    Calcium 8.1 (L) 8.3 - 10.6 MG/DL    Alb 3.7 3.4 - 5.0 GM/DL    Total Protein 5.9 (L) 6.4 - 8.2 GM/DL    Total Bilirubin 0.9 0.0 - 1.0 MG/DL    ALT 12 10 - 40 U/L    AST 23 15 - 37 IU/L    Alkaline Phosphatase 56 40 - 128 IU/L    GFR Non-African American 60 (L) >60 mL/min/1.73m2    GFR African American >60 >60 mL/min/1.73m2    Anion Gap 11 4 - 16   CBC auto differential    Collection Time: 12/13/20  4:26 AM   Result Value Ref Range    WBC 8.8 4.0 - 10.5 K/CU MM    RBC 4.21 (L) 4.6 - 6.2 M/CU MM    Hemoglobin 10.6 (L) 13.5 - 18.0 GM/DL    Hematocrit 34.3 (L) 42 - 52 %    MCV 81.5 78 - 100 FL    MCH 25.2 (L) 27 - 31 PG    MCHC 30.9 (L) 32.0 - 36.0 %    RDW 16.1 (H) 11.7 - 14.9 %    Platelets 322 663 - 114 K/CU MM    MPV 10.0 7.5 - 11.1 FL    Differential Type AUTOMATED DIFFERENTIAL     Segs Relative 73.1 (H) 36 - 66 %    Lymphocytes % 15.9 (L) 24 - 44 %    Monocytes % 9.4 (H) 0 - 4 %    Eosinophils % 0.7 0 - 3 %    Basophils % 0.6 0 - 1 %    Segs Absolute 6.5 K/CU MM Lymphocytes Absolute 1.4 K/CU MM    Monocytes Absolute 0.8 K/CU MM    Eosinophils Absolute 0.1 K/CU MM    Basophils Absolute 0.1 K/CU MM    Nucleated RBC % 0.0 %    Total Nucleated RBC 0.0 K/CU MM    Total Immature Neutrophil 0.03 K/CU MM    Immature Neutrophil % 0.3 0 - 0.43 %   TSH without Reflex    Collection Time: 12/13/20  4:26 AM   Result Value Ref Range    TSH, High Sensitivity 0.327 0.270 - 4.20 uIu/ml       Review of Systems:  Reports of no current cardiovascular, respiratory, gastrointestinal, genitourinary, integumentary, neurological, muscuoskeletal, or immunological symptoms today. PSYCHIATRIC: See HPI above. PSYCHIATRIC EXAMINATION / MENTAL STATUS EXAM    CONSTITUTIONAL:    Vitals:   Vitals:    12/13/20 0815   BP: (!) 162/77   Pulse: 82   Resp: 18   Temp: 97.6 °F (36.4 °C)   SpO2: 98%      General appearance: [x] appears age, []  appears older than stated age,               []  adequately dressed and groomed, [x] disheveled,               [x]  in no acute distress, [] appears mildly distressed, [] other           MUSCULOSKELETAL:   Gait:   [] normal, [] antalgic, [] unsteady, [x] gait not evaluated   Station:             [] erect, [] sitting, [] recumbent, [] other        Strength/tone:  [x] muscle strength and tone appear consistent with age and condition     [] atrophy      [] abnormal movements  PSYCHIATRIC:    Appearance: Appears stated age. Alert and oriented to person, place, time & situation. No acute distress. Inadequate grooming and hygeine. Good eye contact. No prominent physical abnormalities. Attitude: Manner is cooperative and pleasant  Motor: No psychomotor agitation, retardation or abnormal movements noted  Speech: Clearly articulated; normal rate, volume, tone & amount. Language: intact understanding and production  Mood: anxious, elevated  Affect: anxious, full range, non-labile, congruent with mood and content of speech  Thought Production: Spontaneous.   Thought Form: Coherent, linear, logical & goal-directed. No tangentiality or circumstantiality. No flight of ideas or loosening of associations. Thought Content/Perceptions: No YOSELIN, Visual hallucinations present, no auditory hallucinations, ? delusion  Insight: limited  Judgment: limited  Memory: Immediate, recent, and remote appear intact, though not formally tested. Attention: maintained throughout interview  Fund of knowledge: Average  Gait/Balance: not assessed    Impression:   Recurrent major depressive disorder with psychosis    Problem List:   Generalized weakness    Plan:  1. Recommend that patient be transferred to inpatient psychiatric unit once medically stable for acute stabilization  2. Will defer medications to receiving facility  3. Will follow loosely    Thank you for the interesting consult.     Electronically signed by DESIREE Deluca CNP on 12/13/2020 at 6:07 PM

## 2020-12-13 NOTE — CONSULTS
Lindsay Palm [age], 1950, [id number], 12/13/2020    History  [place]:  The primary encounter diagnosis was Generalized weakness. Diagnoses of Fall, initial encounter, Closed head injury, initial encounter, Essential hypertension, and Tachycardia were also pertinent to this visit. Patient  has no past medical history on file. Patient  has no past surgical history on file. Subjective:  Patient states:  \"I don't want to use a cane. \"    Pain:  States generalized pain \"all over\" does not rate but states \"not much\". Communication with other providers:  Handoff to RN, OT  Restrictions: general precautions, fall risk    Home Setup/Prior level of function  Social/Functional History  Lives With: Alone  Type of Home: House  Home Layout: Two level(Bedroom is upstairs.)  Home Access: Level entry  Bathroom Shower/Tub: Walk-in shower  Bathroom Toilet: Standard  Receives Help From: Family  ADL Assistance: [address] [place] [address] Avenue: Independent  Homemaking Responsibilities: Yes  Ambulation Assistance: Independent  Transfer Assistance: Independent  Active : No(Pt reports he utilizes the bus for transportation.)  Additional Comments: Pt may be a poor historian. Recommend follow-up. Examination of body systems (includes body structures/functions, activity/participation limitations):  · Observation:  Pt supine in bed upon arrival and agreeable to therapy  · Vision:  [facility]  · Hearing:  [facility]  · Cardiopulmonary:  No O2  · Cognition: impaired, see OT/SLP note for further evaluation. Musculoskeletal  · ROM R/L:  WFL. · Strength R/L:  4/5, minimal in function and endurance. · Neuro:  Denies n/t or sensation loss      Mobility:  · Rolling L/R:  supervision  · Supine to sit:  supervision  · Transfers: Pt completed STS transfer from bed x3 with GCA  · Sitting balance:  good. · Standing balance:  poor.     · Gait: Pt ambulated 15' x2 bouts with

## 2020-12-13 NOTE — CONSULTS
[address], 1950, [id number], 12/13/2020    Discharge Recommendation: Bassam Hal      History:  [place]:  The primary encounter diagnosis was Generalized weakness. Diagnoses of Fall, initial encounter, Closed head injury, initial encounter, Essential hypertension, and Tachycardia were also pertinent to this visit. Subjective:  Patient states: Agreeable to therapy. Pain: Pt reports chronic pain all over. Does not give numerical rating, states \"very little. \"  Communication with other providers: PT, RN, LSW  Restrictions: General Precautions, Fall Risk    Home Setup/Prior level of function:  Social/Functional History  Lives With: Alone  Type of Home: House  Home Layout: Two level(Bedroom is upstairs.)  Home Access: Level entry  Bathroom Shower/Tub: Walk-in shower  Bathroom Toilet: Standard  Receives Help From: Family  ADL Assistance: Independent  Homemaking Assistance: Independent  Homemaking Responsibilities: Yes  Ambulation Assistance: Independent  Transfer Assistance: Independent  Active : No(Pt reports he utilizes the bus for transportation.)  Additional Comments: Pt may be a poor historian. Recommend follow-up. Examination:  · Observation: Supine in bed upon arrival  · [facility]  · [facility]  · Vitals: Stable vitals throughout session    Body Systems and functions:  · ROM: WFL   · Strength: 4/5 BUE shoulder flexion. · Sensation: WFL  · Tone: Normal  · Coordination: WFL  · Perception: WNL    Activities of Daily Living (ADLs):  · Feeding: Emeka (setup and increased time). · Grooming: SBA-CGA (anticipate pt could complete in standing at sink with assist d/t decreased dynamic standing balance). · UB bathing: Supervision (recommend pt complete in seated). · LB bathing: SBA (recommend pt complete in seated, increased time, VC throughout). · UB dressing: Supervision (setup, increased time, VC).    · LB 15-19=40-59%(CK), 10-14=60-79%(CL), 7-9=80-99%(CM), 6=100%(CN)]     Treatment:  Self Care Training:   Cues were given for safety, sequence, UE/LE placement, visual cues, and balance. Activities performed today included dressing and toileting. Safety Measures: Gait belt used, Left in bed (per RN request d/t impulsivity), Alarm in place    Assessment:  Assessment  Performance deficits / Impairments: Decreased balance, Decreased ADL status, Decreased functional mobility , Decreased strength, Decreased high-level IADLs, Decreased cognition  Treatment Diagnosis: Generalized weakness  Prognosis: Good  Decision Making: Medium Complexity  REQUIRES OT FOLLOW UP: Yes  Discharge Recommendations: [address]    Pt is a 65year old M admitted for generalized weakness. Pt reports that prior to admission he was independent in ADLs, IADLs, and functional mobility. This date, pt demo decreased cognition, balance, and overall functional mobility impacting ADL status. Pt is currently functioning below baseline and would benefit from skilled OT services at SNF. Plan:  Plan  Times per week: 2+  Times per day: Daily      Goals:  1. Pt will complete all aspects of bed mobility for EOB/OOB ADLs Emeka. 2. Pt will complete UB/LB bathing with Emeka and AE as needed. 3. Pt will complete all aspects of LB dressing with supervision and AE as needed. 4. Pt will complete all functional transfers to and from bed, chair, toilet, shower chair with supervision and RW.  5. Pt will ambulate HH distance to bathroom for toileting with SBA and RW. 6. Pt will complete all aspects of toileting task with SBA. Ella  7. Pt will complete oral hygiene/grooming routine in standing at sink with supervision demo good dynamic standing balance for approx 8 minutes. 8. Pt will complete ther ex/ther act with focus on UB strengthening.     Time:   Time in: 1116  Time out: 1140  Timed treatment minutes: 14  Total time: 24      Electronically signed by:       ZACHARY Majano/L, [facility], .[id number]

## 2020-12-13 NOTE — CARE COORDINATION
CM - room # 19 --following pt admission limited documentation -admission is an an Observation for pt with a Hypertensive urgency, generalized weakness   and Atrial Fibrillation with no known previous history. Pt lives at home and was brought into ER for a Fall . Labs for pt are non acute or not significant as is radiology tests.  Niesha Cutler / [address]

## 2020-12-13 NOTE — PROGRESS NOTES
Kacy Douglas MD, e English                Internal Medicine Hospitalist             Daily Progress  Note   Subjective:     Chief Complaint   Patient presents with   Jean Roche     . Sera Dao of nil, been weak and has fallen. Objective:    BP (!) 162/77   Pulse 82   Temp 97.6 °F (36.4 °C) (Oral)   Resp 18   Ht 5' 10\" (1.778 m)   Wt 179 lb (81.2 kg)   SpO2 98%   BMI 25.68 kg/m²      Intake/Output Summary (Last 24 hours) at 12/13/2020 1546  Last data filed at 12/13/2020 0729  Gross per 24 hour   Intake 429 ml   Output 1025 ml   Net -596 ml      Physical Exam:  Heart:  IRIR. Lungs: Mostly clear to auscultation, decreased breath sounds at bases. No wheezes appreciated no crackles heard. Abdomen: Soft, non distended. Bowel sounds appreciated. No obvious liver or spleen enlargement. Non tender, no rebound noted. Extremities: Non tender, +1 swelling noted, strength 5/5 both legs. CNS: Grossly intact.     Labs:  CBC with Differential:    Lab Results   Component Value Date    WBC 8.8 12/13/2020    RBC 4.21 12/13/2020    HGB 10.6 12/13/2020    HCT 34.3 12/13/2020     12/13/2020    MCV 81.5 12/13/2020    MCH 25.2 12/13/2020    MCHC 30.9 12/13/2020    RDW 16.1 12/13/2020    SEGSPCT 73.1 12/13/2020    LYMPHOPCT 15.9 12/13/2020    MONOPCT 9.4 12/13/2020    BASOPCT 0.6 12/13/2020    MONOSABS 0.8 12/13/2020    LYMPHSABS 1.4 12/13/2020    EOSABS 0.1 12/13/2020    BASOSABS 0.1 12/13/2020    DIFFTYPE AUTOMATED DIFFERENTIAL 12/13/2020     CMP:    Lab Results   Component Value Date     12/13/2020    K 3.9 12/13/2020     12/13/2020    CO2 22 12/13/2020    BUN 20 12/13/2020    CREATININE 1.2 12/13/2020    GFRAA >60 12/13/2020    LABGLOM 60 12/13/2020    GLUCOSE 82 12/13/2020    PROT 5.9 12/13/2020    LABALBU 3.7 12/13/2020    CALCIUM 8.1 12/13/2020    BILITOT 0.9 12/13/2020    ALKPHOS 56 12/13/2020    AST 23 12/13/2020    ALT 12 12/13/2020     Recent Labs     12/12/20  1330   TROPONINT <0.010 Lab Results   Component Value Date    TSH 0.327 12/13/2020         sodium chloride 75 mL/hr at 12/13/20 1308      sodium chloride flush  10 mL Intravenous 2 times per day    enoxaparin  40 mg Subcutaneous Daily    metoprolol tartrate  25 mg Oral BID    amLODIPine  5 mg Oral Daily         Assessment:       Patient Active Problem List    Diagnosis Date Noted    Generalized weakness 12/12/2020       Plan:     Problems being addressed this admission:   Generalised weakness  12/12/20-Work up so far negative -Labs unremarkable, mild hypokalemia and GENNA -CT head negative Chest xray negative -UA negative -Covid negative Plan -Admit to telemetry -PT OT  12/13/20- continue to monitor.     Hypertensive urgency  12/12/20-Elevated BP, not noted to be hypertensive -Not on home meds -Start on hydralazine PRN  Start on metoprolol and amlodipine , titrate as needed  12/13/20- b/p is 162/77 today. Continue to monitor. Decrease IVF's and may need to stop it in am.       New onset Afib, slow ventricular response  12/12/20-EKG afib -Order echo, start on metoprolol -Order TSH  12/13/20- consult cardio. chads vasc score is 2 and so needs AC initiated. Will see if cardio agrees. He has history of falls  But then that could be due to it as well. May have SSS. Check troponin.      GENNA  / Hyponatremia  12/12/20-, likely hypovolemic -Hydrate, monitor  12/13/20- resolved.     ?? Depression  12/12/20-Consult psych  12/13/20- on no medications await psychiatry to see patient. Consultants:  robby  Cardiology    General Orders:  Repeat basic labs again in am.  I have explained to the patient and discussed with him/her the treatment plan. D/W RN.    Scott Mendes MD, [address]

## 2020-12-13 NOTE — PROGRESS NOTES
Pt gave permission for me to update his brother Silvia Mane on his condition. Jessicalayton Mane called at this time and he will act as point of contact for updates on him.

## 2020-12-14 ENCOUNTER — APPOINTMENT (OUTPATIENT)
Dept: NUCLEAR MEDICINE | Age: 70
DRG: 309 | End: 2020-12-14
Payer: COMMERCIAL

## 2020-12-14 PROBLEM — I48.91 ATRIAL FIBRILLATION WITH RVR (HCC): Status: ACTIVE | Noted: 2020-12-14

## 2020-12-14 LAB
LV EF: 43 %
LV EF: 46 %
LVEF MODALITY: NORMAL
LVEF MODALITY: NORMAL
MAGNESIUM: 1.9 MG/DL (ref 1.8–2.4)
TROPONIN T: <0.01 NG/ML

## 2020-12-14 PROCEDURE — 6360000002 HC RX W HCPCS: Performed by: INTERNAL MEDICINE

## 2020-12-14 PROCEDURE — 3430000000 HC RX DIAGNOSTIC RADIOPHARMACEUTICAL: Performed by: INTERNAL MEDICINE

## 2020-12-14 PROCEDURE — 99222 1ST HOSP IP/OBS MODERATE 55: CPT | Performed by: INTERNAL MEDICINE

## 2020-12-14 PROCEDURE — 97535 SELF CARE MNGMENT TRAINING: CPT

## 2020-12-14 PROCEDURE — 6370000000 HC RX 637 (ALT 250 FOR IP): Performed by: INTERNAL MEDICINE

## 2020-12-14 PROCEDURE — 2580000003 HC RX 258: Performed by: INTERNAL MEDICINE

## 2020-12-14 PROCEDURE — 36415 COLL VENOUS BLD VENIPUNCTURE: CPT

## 2020-12-14 PROCEDURE — G0378 HOSPITAL OBSERVATION PER HR: HCPCS

## 2020-12-14 PROCEDURE — 83735 ASSAY OF MAGNESIUM: CPT

## 2020-12-14 PROCEDURE — 97530 THERAPEUTIC ACTIVITIES: CPT

## 2020-12-14 PROCEDURE — 1200000000 HC SEMI PRIVATE

## 2020-12-14 PROCEDURE — 78452 HT MUSCLE IMAGE SPECT MULT: CPT

## 2020-12-14 PROCEDURE — 94761 N-INVAS EAR/PLS OXIMETRY MLT: CPT

## 2020-12-14 PROCEDURE — 93017 CV STRESS TEST TRACING ONLY: CPT

## 2020-12-14 PROCEDURE — 96372 THER/PROPH/DIAG INJ SC/IM: CPT

## 2020-12-14 PROCEDURE — 2500000003 HC RX 250 WO HCPCS: Performed by: INTERNAL MEDICINE

## 2020-12-14 PROCEDURE — 93306 TTE W/DOPPLER COMPLETE: CPT

## 2020-12-14 PROCEDURE — A9500 TC99M SESTAMIBI: HCPCS | Performed by: INTERNAL MEDICINE

## 2020-12-14 RX ORDER — AMLODIPINE BESYLATE 10 MG/1
10 TABLET ORAL DAILY
Status: DISCONTINUED | OUTPATIENT
Start: 2020-12-15 | End: 2020-12-15

## 2020-12-14 RX ORDER — TRAZODONE HYDROCHLORIDE 50 MG/1
50 TABLET ORAL NIGHTLY
Status: DISCONTINUED | OUTPATIENT
Start: 2020-12-14 | End: 2020-12-16 | Stop reason: HOSPADM

## 2020-12-14 RX ORDER — AMLODIPINE BESYLATE 5 MG/1
5 TABLET ORAL ONCE
Status: DISCONTINUED | OUTPATIENT
Start: 2020-12-14 | End: 2020-12-16 | Stop reason: HOSPADM

## 2020-12-14 RX ORDER — OLANZAPINE 10 MG/1
5 INJECTION, POWDER, LYOPHILIZED, FOR SOLUTION INTRAMUSCULAR ONCE
Status: COMPLETED | OUTPATIENT
Start: 2020-12-14 | End: 2020-12-14

## 2020-12-14 RX ORDER — METOPROLOL TARTRATE 50 MG/1
50 TABLET, FILM COATED ORAL 2 TIMES DAILY
Status: DISCONTINUED | OUTPATIENT
Start: 2020-12-14 | End: 2020-12-16 | Stop reason: HOSPADM

## 2020-12-14 RX ADMIN — OLANZAPINE 5 MG: 10 INJECTION, POWDER, FOR SOLUTION INTRAMUSCULAR at 19:27

## 2020-12-14 RX ADMIN — TRAZODONE HYDROCHLORIDE 50 MG: 50 TABLET ORAL at 21:17

## 2020-12-14 RX ADMIN — ENOXAPARIN SODIUM 40 MG: 40 INJECTION SUBCUTANEOUS at 08:13

## 2020-12-14 RX ADMIN — METOPROLOL TARTRATE 25 MG: 25 TABLET, FILM COATED ORAL at 08:13

## 2020-12-14 RX ADMIN — AMLODIPINE BESYLATE 5 MG: 5 TABLET ORAL at 08:13

## 2020-12-14 RX ADMIN — METOPROLOL TARTRATE 50 MG: 50 TABLET, FILM COATED ORAL at 21:17

## 2020-12-14 RX ADMIN — SODIUM CHLORIDE, PRESERVATIVE FREE 10 ML: 5 INJECTION INTRAVENOUS at 08:16

## 2020-12-14 RX ADMIN — REGADENOSON 0.4 MG: 0.08 INJECTION, SOLUTION INTRAVENOUS at 12:55

## 2020-12-14 RX ADMIN — ENOXAPARIN SODIUM 80 MG: 80 INJECTION SUBCUTANEOUS at 21:17

## 2020-12-14 RX ADMIN — Medication 10 MILLICURIE: at 14:11

## 2020-12-14 RX ADMIN — Medication 30 MILLICURIE: at 14:11

## 2020-12-14 ASSESSMENT — PAIN SCALES - GENERAL
PAINLEVEL_OUTOF10: 0
PAINLEVEL_OUTOF10: 0

## 2020-12-14 NOTE — PROGRESS NOTES
Pt. Had 5 run of Vtach, then 8 run of Vtach. Dr. Alesha De Jesus notified, check magnesium level. Pt. Remains NPO until results of stress test are confirmed.

## 2020-12-14 NOTE — CONSULTS
CARDIOLOGY CONSULT NOTE   Reason for consultation:  New onset AFIB    Referring physician:  Tracy Moreno MD     Primary care physician: Dr. Dina Menendez    Dear Dr. Dina Menendez  Thanks for the consult. History of present illness:Andrea HGIUERA is a 65 y. o.year old who  presents with fall and non specific weakness and tiredness, he though he had virus, however, he tested negative, admitted to hospital and diagnosed with afib, mild dehydration and renal failure, he is not sure if he had chest pain in past, however, did mention shortness of breath which is mild, for many years, intermittent, self limiting, not associated with cough or fever, gets worse with activity and better with rest,    Chief Complaint   Patient presents with    Fall     Blood pressure, cholesterol, blood glucose and weight are well controlled. Past medical history:    has no past medical history on file. Past surgical history:   has no past surgical history on file. Social History:   reports that he has never smoked.  He has never used smokeless tobacco.  Family history:   no family history of CAD, STROKE of DM    No Known Allergies        enoxaparin (LOVENOX) injection 80 mg, BID      [START ON 12/15/2020] amLODIPine (NORVASC) tablet 10 mg, Daily      amLODIPine (NORVASC) tablet 5 mg, Once      metoprolol tartrate (LOPRESSOR) tablet 50 mg, BID      sodium chloride flush 0.9 % injection 10 mL, 2 times per day      sodium chloride flush 0.9 % injection 10 mL, PRN      promethazine (PHENERGAN) tablet 12.5 mg, Q6H PRN    Or      ondansetron (ZOFRAN) injection 4 mg, Q6H PRN      polyethylene glycol (GLYCOLAX) packet 17 g, Daily PRN      acetaminophen (TYLENOL) tablet 650 mg, Q6H PRN    Or      acetaminophen (TYLENOL) suppository 650 mg, Q6H PRN      potassium chloride (KLOR-CON M) extended release tablet 40 mEq, PRN    Or      potassium bicarb-citric acid (EFFER-K) effervescent tablet 40 mEq, PRN    Or      potassium chloride 10 mEq/100 mL IVPB (Peripheral Line), PRN      magnesium sulfate 2 g in 50 mL IVPB premix, PRN      hydrALAZINE (APRESOLINE) 10 mg in sodium chloride 0.9 % 50 mL ivpb, Q4H PRN      Current Facility-Administered Medications   Medication Dose Route Frequency Provider Last Rate Last Admin    enoxaparin (LOVENOX) injection 80 mg  1 mg/kg Subcutaneous BID Ariadna Mcelroy MD        [START ON 12/15/2020] amLODIPine (NORVASC) tablet 10 mg  10 mg Oral Daily Ariadna Mcelroy MD        amLODIPine (NORVASC) tablet 5 mg  5 mg Oral Once Ariadna Mcelroy MD        metoprolol tartrate (LOPRESSOR) tablet 50 mg  50 mg Oral BID Ariadna Mcelroy MD        sodium chloride flush 0.9 % injection 10 mL  10 mL Intravenous 2 times per day Roger Leonardo MD   10 mL at 12/14/20 0816    sodium chloride flush 0.9 % injection 10 mL  10 mL Intravenous PRN Roger Leonardo MD        promethazine (PHENERGAN) tablet 12.5 mg  12.5 mg Oral Q6H PRN Roger Leonardo MD        Or    ondansetron (ZOFRAN) injection 4 mg  4 mg Intravenous Q6H PRN Roger Leonardo MD        polyethylene glycol (GLYCOLAX) packet 17 g  17 g Oral Daily PRN Roger Leonardo MD        acetaminophen (TYLENOL) tablet 650 mg  650 mg Oral Q6H PRN Roger Leonardo MD        Or   [facility] [place] acetaminophen (TYLENOL) suppository 650 mg  650 mg Rectal Q6H PRN Roger Leonardo MD        potassium chloride (KLOR-CON M) extended release tablet 40 mEq  40 mEq Oral PRN Roger Leonardo MD        Or   [facility] [place] potassium bicarb-citric acid (EFFER-K) effervescent tablet 40 mEq  40 mEq Oral PRN Roger Leonardo MD        Or   [facility] [place] potassium chloride 10 mEq/100 mL IVPB (Peripheral Line)  10 mEq Intravenous PRN Roger Leonardo MD        magnesium sulfate 2 g in 50 mL IVPB premix  2 g Intravenous PRN Roger Leonardo MD        hydrALAZINE (APRESOLINE) 10 mg in sodium chloride 0.9 % 50 mL ivpb  10 mg Intravenous Q4H PRN Roger Leonardo MD   Stopped at 12/13/20 0057     Review of Systems:   · Constitutional: No Fever or Weight Loss   · Eyes: No Decreased Vision  · ENT: No Headaches, Hearing Loss or Vertigo  · Cardiovascular: No chest pain, dyspnea on exertion, palpitations or loss of consciousness  · Respiratory: No cough or wheezing    · Gastrointestinal: No abdominal pain, appetite loss, blood in stools, constipation, diarrhea or heartburn  · Genitourinary: No dysuria, trouble voiding, or hematuria  · Musculoskeletal:  No gait disturbance, weakness or joint complaints  · Integumentary: No rash or pruritis  · Neurological: No TIA or stroke symptoms  · Psychiatric: No anxiety or depression  · Endocrine: No malaise, fatigue or temperature intolerance  · Hematologic/Lymphatic: No bleeding problems, blood clots or swollen lymph nodes  · Allergic/Immunologic: No nasal congestion or hives  All systems negative except as marked. ·   ·      Physical Examination:    Vitals:    12/14/20 0800   BP: (!) 181/87   Pulse: 78   Resp: 16   Temp: 98 °F (36.7 °C)   SpO2: 96%      Wt Readings from Last 3 Encounters:   12/13/20 179 lb (81.2 kg)     Body mass index is 25.68 kg/m². General Appearance:  No distress, conversant    Constitutional:  Well developed, Well nourished, No acute distress, Non-toxic appearance. HENT:  Normocephalic, Atraumatic, Bilateral external ears normal, Oropharynx moist, No oral exudates, Nose normal. Neck- Normal range of motion, No tenderness, Supple, No stridor,no apical-carotid delay, no carotid bruit  Eyes:  PERRL, EOMI, Conjunctiva normal, No discharge. Respiratory:  Normal breath sounds, No respiratory distress, No wheezing, No chest tenderness. ,no use of accessory muscles, diaphragm movement is normal  Cardiovascular: (PMI) apex non displaced,no lifts no thrills, no s3,no s4, Normal heart rate, Normal rhythm, No murmurs, No rubs, No gallops.  Carotid arteries pulse and amplitude are normal no bruit, no abdominal bruit noted ( normal abdominal aorta ausculation), femoral arteries pulse and amplitude are normal no bruit, pedal pulses are normal  GI:  Bowel sounds normal, Soft, No tenderness, No masses, No pulsatile masses, no hepatosplenomegally, no bruits  : External genitalia appear normal, No masses or lesions. No discharge. No CVA tenderness. Musculoskeletal:  Intact distal pulses, No edema, No tenderness, No cyanosis, No clubbing. Good range of motion in all major joints. No tenderness to palpation or major deformities noted. Back- No tenderness. Integument:  Warm, Dry, No erythema, No rash. Skin: no rash, no ulcers  Lymphatic:  No lymphadenopathy noted. Neurologic:  Alert & oriented x 3, Normal motor function, Normal sensory function, No focal deficits noted. Psychiatric:  Affect normal, Judgment normal, Mood normal.   Lab Review   Recent Labs     12/13/20 0426   WBC 8.8   HGB 10.6*   HCT 34.3*         Recent Labs     12/13/20 0426      K 3.9      CO2 22   BUN 20   CREATININE 1.2     Recent Labs     12/13/20 0426   AST 23   ALT 12   BILITOT 0.9   ALKPHOS 56     No results for input(s): TROPONINI in the last 72 hours. No results found for: BNP  No results found for: INR, PROTIME      EKG:afib    Chest Xray:NAD    ECHO:PENDING  Labs, echo, meds reviewed  Assessment: 65 y. o.year old with PMH of schizophrenia  Recommendations:    1. New onset afib: rate is controlled, will not cardiovert, he has some fall risk, will recommend to evaluate his gait and if ok, will recommend full dose anticoagulation, echo ordered, will also need stress test  2. shortness of breath; stress test and echo ordered  3. Schizophrenia: as per medicine and psychiatory  4. HTN: stable,continue novasc  5. Dehydration and GENNA continue IVF  6. Health maintenance: exerise and diet  All labs, medications and tests reviewed, continue all other medications of all above medical condition listed as is.          Lucio Hugo MD, 12/14/2020 10:52 AM

## 2020-12-14 NOTE — PROGRESS NOTES
Cognition/confusion, dec balance, dec strength and endurance. Plan for Next Session:    Continue with POC.     Time in:  1035  Time out:  1115  Timed treatment minutes:  40  Total treatment time:  40  Electronically signed by:    Gal Moore, [address]    12/14/2020, 11:43 AM    Previously filed values:

## 2020-12-14 NOTE — PROGRESS NOTES
Hospitalist Progress Note      Name:  Manuel Garcia /Age/Sex: 1950  (65 y.o. male)   MRN & CSN:  [id number] & [id number] Admission Date/Time: 2020 12:29 PM   Location:  [address] [place] [address] PCP: No primary care provider on file. Hospital Day: 3    Assessment and Plan:   Manuel Garcia is a 65 y.o.  male  who presents with Generalized weakness    1) Generalised weakness  -Infectious work up negative so far   -CT head negative   -PT/OT on board; recommended SNF    2) Paroxysmal Atrial fibrillation; new onset  -Currently rate controlled on BB  -On therapeutic lovenox  -Cardiology on board     3) Schizoaffective disorder  -Psych board  -Recommended inpatient psychiatric unit    4) Essential Hypertension   -Uncontrolled  -Readjust meds    5) GENNA 2/2 dehydration  -Cr improved  -Hydrated with IVF NS  -Avoid nephrotoxic medications    Diet DIET GENERAL;   DVT Prophylaxis [] Lovenox, []  Heparin, [] SCDs, [] Ambulation   GI Prophylaxis [] PPI,  [] H2 Blocker,  [] Carafate,  [] Diet/Tube Feeds   Code Status Full Code   Disposition Inpatient Psych   MDM      History of Present Illness:     Patient was seen and examined  Denied any chest pain, dizziness, palpitations  No fever, chills, N/V/D  Claimed he feels a bit fine    Ten point ROS reviewed negative, unless as noted above    Objective: Intake/Output Summary (Last 24 hours) at 2020 0941  Last data filed at 2020 0816  Gross per 24 hour   Intake 10 ml   Output --   Net 10 ml      Vitals:   Vitals:    20 0800   BP: (!) 181/87   Pulse: 78   Resp: 16   Temp: 98 °F (36.7 °C)   SpO2: 96%     Physical Exam:   GEN Awake male, sitting upright in bed in no apparent distress. Appears given age. EYES Pupils are equally round. No scleral erythema, discharge, or conjunctivitis. HENT Mucous membranes are moist. Oral pharynx without exudates, no evidence of thrush. NECK Supple, no apparent thyromegaly or masses.   RESP Clear to auscultation, no

## 2020-12-14 NOTE — CARE COORDINATION
Reviewed chart and called Dr Marbella Alvarez, reviewed chart with him and he states bed will open tomorrow . Dr Marbella Alvarez plans on taking pt tomorrow once bed open.  CM will revisit in am.

## 2020-12-15 LAB
MAGNESIUM: 2 MG/DL (ref 1.8–2.4)
POTASSIUM SERPL-SCNC: 4.1 MMOL/L (ref 3.5–5.1)

## 2020-12-15 PROCEDURE — 84132 ASSAY OF SERUM POTASSIUM: CPT

## 2020-12-15 PROCEDURE — 6370000000 HC RX 637 (ALT 250 FOR IP): Performed by: INTERNAL MEDICINE

## 2020-12-15 PROCEDURE — 6370000000 HC RX 637 (ALT 250 FOR IP): Performed by: NURSE PRACTITIONER

## 2020-12-15 PROCEDURE — 96372 THER/PROPH/DIAG INJ SC/IM: CPT

## 2020-12-15 PROCEDURE — 6360000002 HC RX W HCPCS: Performed by: INTERNAL MEDICINE

## 2020-12-15 PROCEDURE — 99232 SBSQ HOSP IP/OBS MODERATE 35: CPT | Performed by: INTERNAL MEDICINE

## 2020-12-15 PROCEDURE — 96366 THER/PROPH/DIAG IV INF ADDON: CPT

## 2020-12-15 PROCEDURE — 1200000000 HC SEMI PRIVATE

## 2020-12-15 PROCEDURE — 36415 COLL VENOUS BLD VENIPUNCTURE: CPT

## 2020-12-15 PROCEDURE — 2580000003 HC RX 258: Performed by: INTERNAL MEDICINE

## 2020-12-15 PROCEDURE — 99233 SBSQ HOSP IP/OBS HIGH 50: CPT | Performed by: NURSE PRACTITIONER

## 2020-12-15 PROCEDURE — 83735 ASSAY OF MAGNESIUM: CPT

## 2020-12-15 RX ORDER — HALOPERIDOL 5 MG
2.5 TABLET ORAL EVERY 6 HOURS PRN
Status: DISCONTINUED | OUTPATIENT
Start: 2020-12-15 | End: 2020-12-16 | Stop reason: HOSPADM

## 2020-12-15 RX ORDER — DIPHENHYDRAMINE HCL 25 MG
50 TABLET ORAL EVERY 6 HOURS PRN
Status: DISCONTINUED | OUTPATIENT
Start: 2020-12-15 | End: 2020-12-16 | Stop reason: HOSPADM

## 2020-12-15 RX ORDER — DIPHENHYDRAMINE HYDROCHLORIDE 50 MG/ML
50 INJECTION INTRAMUSCULAR; INTRAVENOUS EVERY 6 HOURS PRN
Status: DISCONTINUED | OUTPATIENT
Start: 2020-12-15 | End: 2020-12-16 | Stop reason: HOSPADM

## 2020-12-15 RX ORDER — LORAZEPAM 1 MG/1
1 TABLET ORAL EVERY 6 HOURS PRN
Status: DISCONTINUED | OUTPATIENT
Start: 2020-12-15 | End: 2020-12-16 | Stop reason: HOSPADM

## 2020-12-15 RX ORDER — LORAZEPAM 2 MG/ML
1 INJECTION INTRAMUSCULAR EVERY 6 HOURS PRN
Status: DISCONTINUED | OUTPATIENT
Start: 2020-12-15 | End: 2020-12-16 | Stop reason: HOSPADM

## 2020-12-15 RX ORDER — HALOPERIDOL 5 MG/ML
2.5 INJECTION INTRAMUSCULAR EVERY 6 HOURS PRN
Status: DISCONTINUED | OUTPATIENT
Start: 2020-12-15 | End: 2020-12-16 | Stop reason: HOSPADM

## 2020-12-15 RX ORDER — LOSARTAN POTASSIUM 25 MG/1
25 TABLET ORAL DAILY
Status: DISCONTINUED | OUTPATIENT
Start: 2020-12-15 | End: 2020-12-16 | Stop reason: HOSPADM

## 2020-12-15 RX ADMIN — LORAZEPAM 1 MG: 1 TABLET ORAL at 18:28

## 2020-12-15 RX ADMIN — HYDRALAZINE HYDROCHLORIDE 10 MG: 20 INJECTION INTRAMUSCULAR; INTRAVENOUS at 01:57

## 2020-12-15 RX ADMIN — SODIUM CHLORIDE, PRESERVATIVE FREE 10 ML: 5 INJECTION INTRAVENOUS at 21:10

## 2020-12-15 RX ADMIN — LOSARTAN POTASSIUM 25 MG: 25 TABLET, FILM COATED ORAL at 13:37

## 2020-12-15 RX ADMIN — ENOXAPARIN SODIUM 80 MG: 80 INJECTION SUBCUTANEOUS at 20:37

## 2020-12-15 RX ADMIN — PROMETHAZINE HYDROCHLORIDE 12.5 MG: 12.5 TABLET ORAL at 15:35

## 2020-12-15 RX ADMIN — SODIUM CHLORIDE, PRESERVATIVE FREE 10 ML: 5 INJECTION INTRAVENOUS at 00:59

## 2020-12-15 RX ADMIN — METOPROLOL TARTRATE 50 MG: 50 TABLET, FILM COATED ORAL at 12:42

## 2020-12-15 RX ADMIN — HALOPERIDOL 2.5 MG: 5 TABLET ORAL at 18:28

## 2020-12-15 RX ADMIN — DIPHENHYDRAMINE HYDROCHLORIDE 50 MG: 25 TABLET ORAL at 18:28

## 2020-12-15 ASSESSMENT — PAIN SCALES - GENERAL: PAINLEVEL_OUTOF10: 0

## 2020-12-15 NOTE — FLOWSHEET NOTE
Pt repositioned in bed. Non- violent Restraints continued in appropriate manner as ordered, continuing to monitor. Sitter in place. Pt agitated and attempts to pull himself out of restraints. Least alternative restrictions were attempted prior to restraints. No signs of injury noted.

## 2020-12-15 NOTE — PROGRESS NOTES
Psychiatric Progress Note    Manuel Garcia  [id number]  12/15/20    CC: \"It was recommended that I come here. .. Jnenifer Anamosa Jennifer Anamosa \"     HPI: Patient noted a fall yesterday, denied any symptoms of chest pain, SOB prior to this. Patient is poor historian, kept saying though interview  \"I am going to die, what is the point of all these? \" kept saying death is near, would not say why he felt so. He denies any suicidal ideation or homicidal ideation. Patient notes there has been very generalized weakness and fatigue in the past couple of days, denies an fever or chills, no chest pain, palpitations , dizziness, cough or shortness of breath, no nausea, vomiting or urinary symptoms. Psychiatry consulted to assess for depression. Patient had been in restraints last night and required security two times. Per policy, patient is required to be out of restraints 24 hours before arriving to [facility]. Discussed with Dr. Tanvir Naik and agreed to reassess  patient for possible calming medications if required while awaiting transfer to [facility]. Pt noted he is doing \"pretty good today. \"  He is lethargic but oriented x3. Pt noted he feels safe and comfortable in the hospital.  Pt was polite and cordial during the interview process. Currently he denies SI/HI. But reports AV hallucinations. Pt is limited in response at this time due to lethargy and questionable dementia but was unable to rate his depression/anxiety. He states he was able to sleep last night, but per staff security was called twice and he was in restraints until 1107. Patient currently is out of restraints. He is calm and cooperative. He was able to engage but did not open his eyes. Insight and judgment are poor. Spoke with Anirudh Buck. States patient is doing ok. No Known Allergies    No medications prior to admission. History reviewed. No pertinent past medical history.      Patient Active Problem List   Diagnosis    Generalized weakness    Recurrent major depressive disorder [facility])    Atrial fibrillation with RVR ([address]       Review of Systems    OBJECTIVE  Vital Signs:  Vitals:    12/15/20 0340   BP: (!) 156/80   Pulse: 86   Resp: 18   Temp: 98.1 °F (36.7 °C)   SpO2: 94%       Labs:  Recent Results (from the past 48 hour(s))   Magnesium    Collection Time: 12/14/20  4:11 PM   Result Value Ref Range    Magnesium 1.9 1.8 - 2.4 mg/dl   Potassium    Collection Time: 12/15/20 11:19 AM   Result Value Ref Range    Potassium 4.1 3.5 - 5.1 MMOL/L   Magnesium    Collection Time: 12/15/20 11:19 AM   Result Value Ref Range    Magnesium 2.0 1.8 - 2.4 mg/dl       PSYCHIATRIC ASSESSMENT / MENTAL STATUS EXAM:   Vitals: Blood pressure (!) 156/80, pulse 86, temperature 98.1 °F (36.7 °C), temperature source Oral, resp. rate 18, height 5' 10\" (1.778 m), weight 179 lb (81.2 kg), SpO2 94 %. CONSTITUTIONAL:    Appearance: appears stated age. alert and oriented to person, place, time & situation. no acute distress. Adequate grooming and hygeine. Poor eye contact. No prominent physical abnormalities. Attitude: Manner is cooperative and pleasant  Motor: No psychomotor agitation, retardation or abnormal movements noted  Speech: Clearly articulated; normal rate, volume, tone & amount. but monotone  Language: intact understanding and production  Mood: depressed  Affect: flat, non reactive. Thought Production: Spontaneous. Thought Form: Coherent, linear, logical & goal-directed. No tangentiality or circumstantiality. No flight of ideas or loosening of associations. Thought Content/Perceptions: No YOSELIN, reports AVH, no delusion  Insight: questionable and poor  Judgment questionable and poor  Memory: Immediate, recent, and remote appear intact, though not formally tested. Attention: maintained throughout interview  Fund of knowledge: Average  Gait/Balance: gait not accesssed         IMPRESSION:   MDD, recurrent severe with psychotic features         PLAN:  1.   Patient to be transferred after 24 hours to [id number] [place] [address] [facility];pending restraint free for 24 hours. 2. Covid negative required tomorrow am.  3.  If patient is aggressive or agitated recommend Haldol 2.5 mg po or IM q6 prn AND Ativan 1 mg po or IM q6 prn  AND Benadryl 50 mg po or IM prn q6. Recommend medications be used together for agitation; either IM or PO for effectiveness. 4. Psychiatry will follow. 5. Thank you for this consult.     Discussed on phone with Dr. Sasha Junior  Discussed on phone with Christell Ahumada CM RN    Electronically signed by DESIREE Cavanaugh - CNP on 12/15/2020 at 12:42 PM

## 2020-12-15 NOTE — PROGRESS NOTES
1. Today's plan: depressed LVEF 40% -45%, NORMAL PERFUSION on stress, add losartan and d.c norvasc, continue lopressor        Admit Date:  12/12/2020    Subjective:ok      Chief complaints on admission  Chief Complaint   Patient presents with    Fall         History of present Rita Dahl is a 65 y. o.year old who  presents with had concerns including Fall. Past medical history: schizophrenia  Past surgical history:none  Social History:   reports that he has never smoked. He has never used smokeless tobacco.  Family history:  family history is not on file. No Known Allergies      Objective:   BP (!) 156/80   Pulse 86   Temp 98.1 °F (36.7 °C) (Oral)   Resp 18   Ht 5' 10\" (1.778 m)   Wt 179 lb (81.2 kg)   SpO2 94%   BMI 25.68 kg/m²       Intake/Output Summary (Last 24 hours) at 12/15/2020 1248  Last data filed at 12/15/2020 0108  Gross per 24 hour   Intake 10 ml   Output 200 ml   Net -190 ml       TELEMETRY: {    Physical Exam:  Constitutional:  Well developed, Well nourished, No acute distress, Non-toxic appearance. HENT:  Normocephalic, Atraumatic, Bilateral external ears normal, Oropharynx moist, No oral exudates, Nose normal. Neck- Normal range of motion, No tenderness, Supple, No stridor. Eyes:  PERRL, EOMI, Conjunctiva normal, No discharge. Respiratory:  Normal breath sounds, No respiratory distress, No wheezing, No chest tenderness. ,no use of accessory muscles, diaphragm movement is normal  Cardiovascular: (PMI) apex non displaced,no lifts no thrills, no s3,no s4, Normal heart rate, Normal rhythm, No murmurs, No rubs, No gallops. Carotid arteries pulse and amplitude are normal no bruit, no abdominal bruit noted ( normal abdominal aorta ausculation), femoral arteries pulse and amplitude are normal no bruit, pedal pulses are normal  GI:  Bowel sounds normal, Soft, No tenderness, No masses, No pulsatile masses. : External genitalia appear normal, No masses or lesions. No discharge. No CVA tenderness. Musculoskeletal:  Intact distal pulses, No edema, No tenderness, No cyanosis, No clubbing. Good range of motion in all major joints. No tenderness to palpation or major deformities noted. Back- No tenderness. Integument:  Warm, Dry, No erythema, No rash. Lymphatic:  No lymphadenopathy noted. Neurologic:  Alert & oriented x 3, Normal motor function, Normal sensory function, No focal deficits noted. Psychiatric:  Affect normal, Judgment normal, Mood normal.     Medications:    enoxaparin  1 mg/kg Subcutaneous BID    amLODIPine  10 mg Oral Daily    amLODIPine  5 mg Oral Once    metoprolol tartrate  50 mg Oral BID    traZODone  50 mg Oral Nightly    sodium chloride flush  10 mL Intravenous 2 times per day       sodium chloride flush, promethazine **OR** ondansetron, polyethylene glycol, acetaminophen **OR** acetaminophen, potassium chloride **OR** potassium alternative oral replacement **OR** potassium chloride, magnesium sulfate, hydrALAZINE (APRESOLINE) ivpb    Lab Data:  CBC:   Recent Labs     12/12/20  1330 12/13/20  0426   WBC 7.6 8.8   HGB 12.0* 10.6*   HCT 37.4* 34.3*   MCV 81.0 81.5    198     BMP:   Recent Labs     12/12/20  1330 12/13/20  0426 12/15/20  1119   * 136  --    K 4.2 3.9 4.1   CL 97* 103  --    CO2 23 22  --    BUN 23 20  --    CREATININE 1.4* 1.2  --      LIVER PROFILE:   Recent Labs     12/12/20  1330 12/13/20  0426   AST 25 23   ALT 14 12   BILITOT 0.8 0.9   ALKPHOS 65 56     PT/INR: No results for input(s): PROTIME, INR in the last 72 hours. APTT: No results for input(s): APTT in the last 72 hours. BNP:  No results for input(s): BNP in the last 72 hours. TROPONIN: @TROPONINI:3@      Assessment:  65 y. o.year old who is admitted for          Plan:  2.  New onset afib: rate is controlled, will not cardiovert, he has some fall risk, will recommend to evaluate his gait and if ok, will recommend full dose anticoagulation,   3. shortness of breath; depressed LVEF 40% -45%, NORMAL PERFUSION on stress, add losartan and d.c norvasc, continue lopressor  4. Schizophrenia: as per medicine and psychiatory  5. HTN: stable,continue novasc  6. Dehydration and GENNA continue IVF  All labs, medications and tests reviewed, continue all other medications of all above medical condition listed as is.       Anthony Campos MD 12/15/2020 12:48 PM

## 2020-12-15 NOTE — FLOWSHEET NOTE
Called Pharmacy to send PRN Hydralazine order up per recent BP of 189/96. Pharmacy did not answer, will recheck BP and recall Pharmacy.

## 2020-12-15 NOTE — PROGRESS NOTES
Patient had become agitated. Was walking in the cope. Would not go back to his room or sit in a chair. Started yelling we were trying to do something to his family, also something about a rally outside and we were making him look stupid. Security was called. Dr. Clint Elmore contacted. One time Order obtained for Zyprexa. Zyprexa given. Patient in room and sitting in chair with a sitter.

## 2020-12-15 NOTE — CARE COORDINATION
Dr Jasson Alvarez ready to discharge pt to [facility] today, called Manda Lind on [facility]. She states pt in restraints and they can not take pt till out of restraints till for 24 hrs. Spoke with Dr Jasson Alvarez and nursing, pt will be taken out of restraints, he also asked that they reconsider d/t pt very calm and only agitated d/t Psych issues. Manda Lind will speak with NP at [facility] and call this CM back. 1120 Received call from [facility] NP Brittny Birmingham. , [facility] will not take pt will out of restraints for 24 hrs and will need a neg Covid day pt comes. Dr Jasson Alvarez at [facility] desk and speaking with Davian Yang about plan for pt's Psych medications/plan.

## 2020-12-15 NOTE — PROGRESS NOTES
Responded to bed alarm. Patient standing by  camera, unsteady on feet. When asked patient to sit down on bed patient yelled \"get out of my house! \" while charging at nurse. Security and hospitalist notified. Security assisted patient in bed, bilateral wrist restraints applied.

## 2020-12-15 NOTE — PROGRESS NOTES
Pt removed from restraints at this time, per verbal order from Dr. Leanna Pantoja.  Pt in bed with eyes closed, RR>10

## 2020-12-15 NOTE — PROGRESS NOTES
Hospitalist Progress Note      Name:  Layton Kaur /Age/Sex: 1950  (65 y.o. male)   MRN & CSN:  [id number] & [id number] Admission Date/Time: 2020 12:29 PM   Location:  [address] [place] [address] PCP: No primary care provider on file. Hospital Day: 4    Assessment and Plan:   Layton Kaur is a 65 y.o.  male  who presents with Generalized weakness     Assessment and Plan  1) Generalised weakness  -Infectious work up negative so far   -CT head negative   -PT/OT on board; recommended SNF     2) Paroxysmal Atrial fibrillation; new onset  -Currently rate controlled on BB  -Cardiology on board; continue with ASA. -Patient is a fall risk and so AC not used     3) Schizoaffective disorder  -Psych board  -Recommended inpatient psychiatric unit  -Please use chemical restraint; not physical restraint when agitated as patient was not discharged today to [facility] due to physical restraint placed overnight     4) Essential Hypertension   -Uncontrolled  -Readjust meds     5) GENNA 2/2 dehydration  -Cr improved  -Hydrated with IVF NS  -Avoid nephrotoxic medications    Diet DIET CARDIAC;   DVT Prophylaxis [] Lovenox, []  Heparin, [] SCDs, [] Ambulation   GI Prophylaxis [] PPI,  [] H2 Blocker,  [] Carafate,  [] Diet/Tube Feeds   Code Status Full Code   Disposition [facility]   MDM      History of Present Illness:     Patient was seen and examined  Denied any chest pain or dizziness  No fever, chills, N/V/D  Laying calm in bed       Ten point ROS reviewed negative, unless as noted above    Objective: Intake/Output Summary (Last 24 hours) at 12/15/2020 1335  Last data filed at 12/15/2020 0108  Gross per 24 hour   Intake 10 ml   Output 200 ml   Net -190 ml      Vitals:   Vitals:    12/15/20 0340   BP: (!) 156/80   Pulse: 86   Resp: 18   Temp: 98.1 °F (36.7 °C)   SpO2: 94%     Physical Exam:   GEN Awake male, sitting upright in bed in no apparent distress. Appears given age. EYES Pupils are equally round.   No scleral erythema, discharge, or conjunctivitis. HENT Mucous membranes are moist. Oral pharynx without exudates, no evidence of thrush. NECK Supple, no apparent thyromegaly or masses. RESP Clear to auscultation, no wheezes, rales or rhonchi. Symmetric chest movement while on room air. CARDIO/VASC S1/S2 auscultated. Regular rate without appreciable murmurs, rubs, or gallops. No JVD or carotid bruits. Peripheral pulses equal bilaterally and palpable. No peripheral edema. GI Abdomen is soft without significant tenderness, masses, or guarding. Bowel sounds are normoactive. Rectal exam deferred.  No costovertebral angle tenderness. Normal appearing external genitalia. Corcoran catheter is not present. HEME/LYMPH No palpable cervical lymphadenopathy and no hepatosplenomegaly. No petechiae or ecchymoses. MSK No gross joint deformities. SKIN Normal coloration, warm, dry. NEURO Cranial nerves appear grossly intact, normal speech, no lateralizing weakness. PSYCH Awake, alert. Affect appropriate.     Medications:   Medications:    losartan  25 mg Oral Daily    enoxaparin  1 mg/kg Subcutaneous BID    amLODIPine  5 mg Oral Once    metoprolol tartrate  50 mg Oral BID    traZODone  50 mg Oral Nightly    sodium chloride flush  10 mL Intravenous 2 times per day      Infusions:   PRN Meds:     haloperidol, 2.5 mg, Q6H PRN    Or      haloperidol lactate, 2.5 mg, Q6H PRN      LORazepam, 1 mg, Q6H PRN    Or      LORazepam, 1 mg, Q6H PRN      diphenhydrAMINE, 50 mg, Q6H PRN    Or      diphenhydrAMINE, 50 mg, Q6H PRN      sodium chloride flush, 10 mL, PRN      promethazine, 12.5 mg, Q6H PRN    Or      ondansetron, 4 mg, Q6H PRN      polyethylene glycol, 17 g, Daily PRN      acetaminophen, 650 mg, Q6H PRN    Or      acetaminophen, 650 mg, Q6H PRN      potassium chloride, 40 mEq, PRN    Or      potassium alternative oral replacement, 40 mEq, PRN    Or      potassium chloride, 10 mEq, PRN      magnesium sulfate, 2 g, PRN     hydrALAZINE (APRESOLINE) ivpb, 10 mg, Q4H PRN          Electronically signed by Esther Schuler MD on 12/15/2020 at 1:35 PM

## 2020-12-15 NOTE — PROGRESS NOTES
Physical Therapy  Per nurse hold therapy at this time, patient sleeping, was agitated last night, requested patient have time to rest.

## 2020-12-16 ENCOUNTER — HOSPITAL ENCOUNTER (INPATIENT)
Age: 70
LOS: 12 days | Discharge: HOME HEALTH | DRG: 885 | End: 2020-12-28
Attending: PSYCHIATRY & NEUROLOGY | Admitting: PSYCHIATRY & NEUROLOGY
Payer: COMMERCIAL

## 2020-12-16 VITALS
TEMPERATURE: 98.3 F | HEART RATE: 103 BPM | DIASTOLIC BLOOD PRESSURE: 98 MMHG | RESPIRATION RATE: 18 BRPM | OXYGEN SATURATION: 95 % | HEIGHT: 70 IN | BODY MASS INDEX: 25.62 KG/M2 | WEIGHT: 179 LBS | SYSTOLIC BLOOD PRESSURE: 130 MMHG

## 2020-12-16 PROBLEM — F33.3 MAJOR DEPRESSIVE DISORDER, RECURRENT EPISODE, SEVERE, WITH PSYCHOSIS (HCC): Status: ACTIVE | Noted: 2020-12-16

## 2020-12-16 LAB
SARS-COV-2, NAAT: NOT DETECTED
SOURCE: NORMAL

## 2020-12-16 PROCEDURE — 6360000002 HC RX W HCPCS: Performed by: INTERNAL MEDICINE

## 2020-12-16 PROCEDURE — 6370000000 HC RX 637 (ALT 250 FOR IP): Performed by: INTERNAL MEDICINE

## 2020-12-16 PROCEDURE — 97535 SELF CARE MNGMENT TRAINING: CPT

## 2020-12-16 PROCEDURE — U0002 COVID-19 LAB TEST NON-CDC: HCPCS

## 2020-12-16 PROCEDURE — 96372 THER/PROPH/DIAG INJ SC/IM: CPT

## 2020-12-16 PROCEDURE — 97530 THERAPEUTIC ACTIVITIES: CPT

## 2020-12-16 PROCEDURE — 1240000000 HC EMOTIONAL WELLNESS R&B

## 2020-12-16 PROCEDURE — 2580000003 HC RX 258: Performed by: INTERNAL MEDICINE

## 2020-12-16 PROCEDURE — 97112 NEUROMUSCULAR REEDUCATION: CPT

## 2020-12-16 PROCEDURE — 6370000000 HC RX 637 (ALT 250 FOR IP): Performed by: PSYCHIATRY & NEUROLOGY

## 2020-12-16 PROCEDURE — 97116 GAIT TRAINING THERAPY: CPT

## 2020-12-16 RX ORDER — METOPROLOL TARTRATE 50 MG/1
50 TABLET, FILM COATED ORAL 2 TIMES DAILY
Qty: 60 TABLET | Refills: 3 | Status: ON HOLD | OUTPATIENT
Start: 2020-12-16 | End: 2020-12-28 | Stop reason: HOSPADM

## 2020-12-16 RX ORDER — METOPROLOL TARTRATE 50 MG/1
50 TABLET, FILM COATED ORAL 2 TIMES DAILY
Status: DISCONTINUED | OUTPATIENT
Start: 2020-12-16 | End: 2020-12-28 | Stop reason: HOSPADM

## 2020-12-16 RX ORDER — LOSARTAN POTASSIUM 25 MG/1
25 TABLET ORAL DAILY
Status: DISCONTINUED | OUTPATIENT
Start: 2020-12-17 | End: 2020-12-28 | Stop reason: HOSPADM

## 2020-12-16 RX ORDER — LORAZEPAM 2 MG/ML
1 INJECTION INTRAMUSCULAR EVERY 4 HOURS PRN
Status: DISCONTINUED | OUTPATIENT
Start: 2020-12-16 | End: 2020-12-28 | Stop reason: HOSPADM

## 2020-12-16 RX ORDER — LOSARTAN POTASSIUM 25 MG/1
25 TABLET ORAL DAILY
Qty: 30 TABLET | Refills: 3 | Status: ON HOLD | OUTPATIENT
Start: 2020-12-16 | End: 2020-12-28 | Stop reason: HOSPADM

## 2020-12-16 RX ORDER — ASPIRIN 81 MG/1
81 TABLET ORAL DAILY
Status: DISCONTINUED | OUTPATIENT
Start: 2020-12-17 | End: 2020-12-28 | Stop reason: HOSPADM

## 2020-12-16 RX ORDER — ARIPIPRAZOLE 5 MG/1
5 TABLET ORAL NIGHTLY
Status: DISCONTINUED | OUTPATIENT
Start: 2020-12-16 | End: 2020-12-18

## 2020-12-16 RX ORDER — POLYETHYLENE GLYCOL 3350 17 G/17G
17 POWDER, FOR SOLUTION ORAL DAILY PRN
Status: DISCONTINUED | OUTPATIENT
Start: 2020-12-16 | End: 2020-12-28 | Stop reason: HOSPADM

## 2020-12-16 RX ORDER — AMLODIPINE BESYLATE 10 MG/1
10 TABLET ORAL DAILY
Qty: 30 TABLET | Refills: 0 | Status: ON HOLD | OUTPATIENT
Start: 2020-12-16 | End: 2020-12-28 | Stop reason: HOSPADM

## 2020-12-16 RX ORDER — ACETAMINOPHEN 500 MG
500 TABLET ORAL EVERY 4 HOURS PRN
Status: DISCONTINUED | OUTPATIENT
Start: 2020-12-16 | End: 2020-12-28 | Stop reason: HOSPADM

## 2020-12-16 RX ORDER — HALOPERIDOL 5 MG/ML
5 INJECTION INTRAMUSCULAR EVERY 4 HOURS PRN
Status: DISCONTINUED | OUTPATIENT
Start: 2020-12-16 | End: 2020-12-28 | Stop reason: HOSPADM

## 2020-12-16 RX ORDER — ACETAMINOPHEN 325 MG/1
325 TABLET ORAL EVERY 4 HOURS PRN
Status: DISCONTINUED | OUTPATIENT
Start: 2020-12-16 | End: 2020-12-28 | Stop reason: HOSPADM

## 2020-12-16 RX ORDER — TRAZODONE HYDROCHLORIDE 50 MG/1
50 TABLET ORAL NIGHTLY
Status: DISCONTINUED | OUTPATIENT
Start: 2020-12-16 | End: 2020-12-28 | Stop reason: HOSPADM

## 2020-12-16 RX ORDER — ACETAMINOPHEN 325 MG/1
650 TABLET ORAL EVERY 4 HOURS PRN
Status: DISCONTINUED | OUTPATIENT
Start: 2020-12-16 | End: 2020-12-28 | Stop reason: HOSPADM

## 2020-12-16 RX ORDER — TRAZODONE HYDROCHLORIDE 50 MG/1
50 TABLET ORAL NIGHTLY
Qty: 30 TABLET | Refills: 0 | Status: ON HOLD | OUTPATIENT
Start: 2020-12-16 | End: 2020-12-28 | Stop reason: HOSPADM

## 2020-12-16 RX ORDER — DIPHENHYDRAMINE HYDROCHLORIDE 50 MG/ML
25 INJECTION INTRAMUSCULAR; INTRAVENOUS EVERY 4 HOURS PRN
Status: DISCONTINUED | OUTPATIENT
Start: 2020-12-16 | End: 2020-12-28 | Stop reason: HOSPADM

## 2020-12-16 RX ORDER — AMLODIPINE BESYLATE 10 MG/1
10 TABLET ORAL DAILY
Status: DISCONTINUED | OUTPATIENT
Start: 2020-12-17 | End: 2020-12-28 | Stop reason: HOSPADM

## 2020-12-16 RX ORDER — ASPIRIN 81 MG/1
81 TABLET ORAL DAILY
Qty: 90 TABLET | Refills: 1 | Status: ON HOLD | OUTPATIENT
Start: 2020-12-16 | End: 2020-12-28 | Stop reason: HOSPADM

## 2020-12-16 RX ORDER — LORAZEPAM 1 MG/1
1 TABLET ORAL EVERY 4 HOURS PRN
Status: DISCONTINUED | OUTPATIENT
Start: 2020-12-16 | End: 2020-12-28 | Stop reason: HOSPADM

## 2020-12-16 RX ORDER — HALOPERIDOL 5 MG
5 TABLET ORAL EVERY 4 HOURS PRN
Status: DISCONTINUED | OUTPATIENT
Start: 2020-12-16 | End: 2020-12-28 | Stop reason: HOSPADM

## 2020-12-16 RX ADMIN — ARIPIPRAZOLE 5 MG: 5 TABLET ORAL at 20:26

## 2020-12-16 RX ADMIN — ENOXAPARIN SODIUM 80 MG: 80 INJECTION SUBCUTANEOUS at 08:10

## 2020-12-16 RX ADMIN — SODIUM CHLORIDE, PRESERVATIVE FREE 10 ML: 5 INJECTION INTRAVENOUS at 08:12

## 2020-12-16 RX ADMIN — METOPROLOL TARTRATE 50 MG: 50 TABLET, FILM COATED ORAL at 08:10

## 2020-12-16 RX ADMIN — LOSARTAN POTASSIUM 25 MG: 25 TABLET, FILM COATED ORAL at 08:10

## 2020-12-16 RX ADMIN — METOPROLOL TARTRATE 50 MG: 50 TABLET, FILM COATED ORAL at 20:26

## 2020-12-16 RX ADMIN — TRAZODONE HYDROCHLORIDE 50 MG: 50 TABLET ORAL at 20:26

## 2020-12-16 ASSESSMENT — PAIN SCALES - GENERAL
PAINLEVEL_OUTOF10: 0
PAINLEVEL_OUTOF10: 0

## 2020-12-16 ASSESSMENT — SLEEP AND FATIGUE QUESTIONNAIRES
DIFFICULTY STAYING ASLEEP: YES
DIFFICULTY ARISING: NO
SLEEP PATTERN: DIFFICULTY FALLING ASLEEP
RESTFUL SLEEP: NO
DO YOU USE A SLEEP AID: NO
DO YOU HAVE DIFFICULTY SLEEPING: YES
AVERAGE NUMBER OF SLEEP HOURS: 5
DIFFICULTY FALLING ASLEEP: YES

## 2020-12-16 ASSESSMENT — LIFESTYLE VARIABLES: HISTORY_ALCOHOL_USE: NO

## 2020-12-16 NOTE — PROGRESS NOTES
Occupational Therapy    Occupational Therapy Treatment Note  Name: Rik Montague MRN: [id number] :   1950   Date:  2020   Admission Date: 2020 Room:  [facility]8815-T   Restrictions/Precautions:    General Precautions, Fall Risk  Communication with other providers:  PTA, Orienting OT, RN    Subjective:  Patient states:  Agreeable to therapy. Pain: Pt denies pain this date. Objective:    Observation:  Pt was received supine in bed upon arrival.   Objective Measures:  Vitals stable throughout session. Treatment, including education:  Therapeutic Activity Training:   Therapeutic activity training was instructed today. Cues were given for safety, sequence, UE/LE placement, awareness, and balance. Activities performed today included bed mobility training, sup-sit, sit-stand, ambulation. Self Care Training:   Cues were given for safety, sequence, UE/LE placement, visual cues, and balance. Activities performed today included dressing, toileting, hand hygiene, and grooming. Pt performed supine to seated bed mobility this date with CGA and VC throughout for sequencing. Pt donned socks while seated EOB with CGA (setup, increased time, VC for sequencing and CGA for balancing d/t increased forward flexed posture while distal reaching). Pt performed STS from EOB with Desiree and RW, VC throughout for sequencing. Pt ambulated to the bathroom with CGA and RW. Pt doffed depend in standing with CGA for balancing and VC for sequencing. Pt performed stand to sit to toilet with CGA. Pt completed toileting and performed STS from toilet with supervision. Pt performed yessy care/donned depend in standing with CGA for balancing, setup, increased time, and VC for sequencing. Pt performed oral hygiene and brushed hair at sink with SBA-CGA for dynamic standing balance and VC for sequencing through task. Pt required increased cueing to initiate, continue, attend to, and terminate tasks.  Pt then ambulated approx 15ft with RW with CGA-ModA, demo 1 LOB throughout. Pt reports feeling fatigued and takes seated rest break. Pt then performed STS from chair with Desiree demo knee buckling and required assist to reach upright posture. Pt ambulated another 15ft and performed stand to sit to EOB with CGA and VC for sequencing . Pt performed seated to supine bed mobility with supervision. Pt was left supine in bed, all needs met, alarm in place. Assessment / Impression:        Patient's tolerance of treatment:  Good  Adverse Reaction: None  Significant change in status and impact:  Continuing to progress  Barriers to improvement:  Cognition, Balance     Plan for Next Session:    Continue with POC    Time in:  1050  Time out:  1034  Timed treatment minutes:  44  Total treatment time:  44    Electronically signed by: Kady Horta,   12/16/2020, 10:59 AM    Previously filed values:    Goals:  1. Pt will complete all aspects of bed mobility for EOB/OOB ADLs Emeka. 2. Pt will complete UB/LB bathing with Emeka and AE as needed. 3. Pt will complete all aspects of LB dressing with supervision and AE as needed. 4. Pt will complete all functional transfers to and from bed, chair, toilet, shower chair with supervision and RW.  5. Pt will ambulate HH distance to bathroom for toileting with SBA and RW. 6. Pt will complete all aspects of toileting task with SBA. Ella De Anda 7. Pt will complete oral hygiene/grooming routine in standing at sink with supervision demo good dynamic standing balance for approx 8 minutes. 8. Pt will complete ther ex/ther act with focus on UB strengthening.

## 2020-12-16 NOTE — CARE COORDINATION
CM updated that patient has been out of restraints sense 11:05am yesterday. Psych is going to evaluate patient today for admission to [facility].     1457  Updated that the pateint is set up with med trans for 1630 to go to [facility].

## 2020-12-16 NOTE — DISCHARGE SUMMARY
Discharge Summary    Name:  Julio Galvan /Age/Sex: 1950  (65 y.o. male)   MRN & CSN:  [id number] & [id number] Admission Date/Time: 2020 12:29 PM   Attending:  Tadeo Hurley MD Discharging Physician: Caren Sorenson MD     Hospital Course:   Julio Galvan is a 65 y.o.  male  who presents with Generalized weakness    Patient was seen and examined  Denied any chest or abdominal pain  No dizziness, palpitation  No fever, chills  Agitation seems to be less this morning; medicated overnight     Assessment and Plan  1) Generalised weakness  -Infectious work up negative so far   -CT head negative   -PT/OT on board; recommended SNF     2) Paroxysmal Atrial fibrillation; new onset  -Currently rate controlled on BB  -Cardiology on board; continue with ASA. -Patient is a fall risk and so AC not used     3) Schizoaffective disorder  -Psych board  -Dc to [facility]     4) Essential Hypertension   -Uncontrolled  -Readjust meds     5) GENNA 2/2 dehydration  -Cr improved  -Hydrated with IVF NS  -Avoid nephrotoxic medications    The patient expressed appropriate understanding of and agreement with the discharge recommendations, medications, and plan.      Consults this admission:  IP CONSULT TO HOSPITALIST  IP CONSULT TO PSYCHIATRY  IP CONSULT TO CARDIOLOGY    Discharge Instruction:   Follow up appointments:   Primary care physician:  within 2 weeks    Diet:  regular diet   Activity: activity as tolerated  Disposition: Discharged to:   [x]Psych Unit, [][facility], []SNF, []Acute Rehab, []Hospice   Condition on discharge: Stable    Discharge Medications:      [id number]    Printed on:20 0735   Medication Information                      amLODIPine (NORVASC) 10 MG tablet  Take 1 tablet by mouth daily             aspirin EC 81 MG EC tablet  Take 1 tablet by mouth daily             losartan (COZAAR) 25 MG tablet  Take 1 tablet by mouth daily             metoprolol tartrate (LOPRESSOR) 50 MG tablet  Take 1 tablet by mouth 2 times daily             traZODone (DESYREL) 50 MG tablet  Take 1 tablet by mouth nightly                 Objective Findings at Discharge:   BP (!) 146/75   Pulse 94   Temp 98.2 °F (36.8 °C) (Axillary)   Resp 18   Ht 5' 10\" (1.778 m)   Wt 179 lb (81.2 kg)   SpO2 95%   BMI 25.68 kg/m²            PHYSICAL EXAM   GEN Awake male, sitting upright in bed in no apparent distress. Appears given age. EYES Pupils are equally round. No scleral erythema, discharge, or conjunctivitis. HENT Mucous membranes are moist. Oral pharynx without exudates, no evidence of thrush. NECK Supple, no apparent thyromegaly or masses. RESP Clear to auscultation, no wheezes, rales or rhonchi. Symmetric chest movement while on room air. CARDIO/VASC S1/S2 auscultated. Regular rate without appreciable murmurs, rubs, or gallops. No JVD or carotid bruits. Peripheral pulses equal bilaterally and palpable. No peripheral edema. GI Abdomen is soft without significant tenderness, masses, or guarding. Bowel sounds are normoactive. Rectal exam deferred.  No costovertebral angle tenderness. Normal appearing external genitalia. Corcoran catheter is not present. HEME/LYMPH No palpable cervical lymphadenopathy and no hepatosplenomegaly. No petechiae or ecchymoses. MSK No gross joint deformities. SKIN Normal coloration, warm, dry. NEURO Cranial nerves appear grossly intact, normal speech, no lateralizing weakness. PSYCH Awake, alert, oriented x 4. Affect appropriate.     BMP/CBC  Recent Labs     12/15/20  1119   K 4.1       IMAGING:  As above    Discharge Time of 35 minutes    Electronically signed by Lizzy Jackson MD on 12/16/2020 at 7:35 AM

## 2020-12-16 NOTE — PROGRESS NOTES
Pt arrived from [facility] at [phone], alert and oriented. He is able to ambulate independently. He is pleasant and cooperative and ate his supper and answered admission questions. He denies SI/HI and AVH.

## 2020-12-16 NOTE — PROGRESS NOTES
[facility]  Admission Note     Admission Type: Pt signed voluntary while at [facility]. Admission Type: Voluntary    Reason for admission:   Reason for Admission: \"to get my mind right\"    PATIENT STRENGTHS:  Strengths: Motivated, Positive Support    Patient Strengths and Limitations:  Limitations: General negative or hopeless attitude about future/recovery    Addictive Behavior:   Addictive Behavior  In the past 3 months, have you felt or has someone told you that you have a problem with:  : None  Do you have a history of Chemical Use?: No  Do you have a history of Alcohol Use?: No  Do you have a history of Street Drug Abuse?: No  Histroy of Prescripton Drug Abuse?: No    Medical Problems:   No past medical history on file.     Status EXAM:  Status and Exam  Normal: No  Facial Expression: Flat  Affect: Appropriate  Level of Consciousness: Alert  Mood:Normal: Yes  Motor Activity:Normal: Yes  Interview Behavior: Cooperative  Preception: Ransom to Person, Isidor Babinski to Time, Ransom to Place, Ransom to Situation  Attention:Normal: Yes  Thought Processes: Tangential  Thought Content:Normal: Yes  Hallucinations: None  Delusions: No  Memory:Normal: Yes  Insight and Judgment: No  Insight and Judgment: Poor Judgment, Poor Insight  Present Suicidal Ideation: No  Present Homicidal Ideation: No    Tobacco Screening:  Practical Counseling, on admission, cee X, if applicable and completed (first 3 are required if patient doesn't refuse):            ( )  Recognizing danger situations (included triggers and roadblocks)                    ( )  Coping skills (new ways to manage stress, exercise, relaxation techniques, changing routine, distraction)                                                           ( )  Basic information about quitting (benefits of quitting, techniques in how to quit, available resources  ( ) Referral for counseling faxed to [facility]                                           ( ) Patient refused counseling  (x ) Patient has not smoked in the last 30 days      Pt admitted with followings belongings:  Dentures: None  Vision - Corrective Lenses: None  Hearing Aid: None  Jewelry: None  Body Piercings Removed: N/A  Clothing: Other (Comment)(see belongings note)  Were All Patient Medications Collected?: Not Applicable  Other Valuables: None     Valuables arrived with no valuables and no medications. His belongings are categorized in a separate note. Patient oriented to surroundings and program expectations and copy of patient rights given. Patient offered Psychiatric Advanced Directive:refused   Received admission packet:  yes. Consents reviewed, signed yes. Patient verbalize understanding:  Yes. Paient education on precautions: Yes.      Marielena Mart RN

## 2020-12-16 NOTE — PROGRESS NOTES
Physical Therapy      Physical Therapy Treatment Note  Name: Andrea Penny MRN: [id number] :   1950   Date:  2020   Admission Date: 2020 Room:  [facility]7702-   Restrictions/Precautions:        General Precautions, Fall Risk  Communication with other providers:  RN, OT  Subjective:  Patient states:  Agreeable to session  Pain:   Location, Type, Intensity (0/10 to 10/10):  Pt denies pain this date. Objective:    Observation:  Pt in bed upon arrival.     Treatment, including education/measures:  Transfers  Supine to sit :SBA  Sit to supine :SBA  Scooting :SBA  Rolling :SBA  Sit to stand :CGA from chair x2 trials; x1 from toilet. Stand to sit :CGA from chair x2 trials; x1 from toilet. Pt seated EOB dons socks with CGA due to exaggerated forward leaning during donning. Pt performed static/dynamic standing balance during bathroom hygiene CGA/SBA without handheld support for ~15 minutes. Pt required frequent VC's for redirection, to continue and initiate tasks. Gait:  Pt amb with RW for 15ft x 2 with CGA/modA; pt demonstrates steady edwina and even step length; pt demonstrates occasional variation of direction during ambulation as well. Pt needed VC's for sequencing and safety awareness. Pt did experience LOB to R requiring modA to correct balance. Pt then reports feeling fatigued requesting seated rest break. Pt knee buckling x 1 was observed this date as well; pt able to self correct. Pt required increased time throughout session due to talkative nature and ease of distraction from tasks. Assessment / Impression:       Patient's tolerance of treatment:  good   Adverse Reaction: none  Significant change in status and impact:  none  Barriers to improvement:  Cognition, balance    Safety  Patient left safely in the bed, with call light/phone in reach with alarm applied. Gait belt and mask were used for transfers and gait.     Plan for Next Session:    Will cont to work towards pt's goals per his tolerance    Time in:  1050  Time out:  1134  Timed treatment minutes:  44  Total treatment time:  40    Previously filed items:  Social/Functional History  Lives With: Alone  Type of Home: House  Home Layout: Two level(Bedroom is upstairs.)  Home Access: Level entry  Bathroom Shower/Tub: Walk-in shower  Bathroom Toilet: Standard  Receives Help From: Family  ADL Assistance: Independent  Homemaking Assistance: Independent  Homemaking Responsibilities: Yes  Ambulation Assistance: Independent  Transfer Assistance: Independent  Active : No(Pt reports he utilizes the bus for transportation.)  Additional Comments: Pt may be a poor historian. Recommend follow-up.   Short term goals  Time Frame for Short term goals: 1 week  Short term goal 1: Pt to complete all bed mobility mod I  Short term goal 2: Pt to complete all STS transfers to/from bed, commode, and chair mod I  Short term goal 3: Pt to ambulate 48' with LRAD mod I       Electronically signed by:    Beth Jean-Baptiste, PTA  12/16/2020, 12:43 PM

## 2020-12-16 NOTE — PLAN OF CARE
Ongoing     Problem: Depressive Behavior With or Without Suicide Precautions:  Goal: Able to verbalize acceptance of life and situations over which he or she has no control  Description: Able to verbalize acceptance of life and situations over which he or she has no control  Outcome: Ongoing  Goal: Able to verbalize and/or display a decrease in depressive symptoms  Description: Able to verbalize and/or display a decrease in depressive symptoms  Outcome: Ongoing  Goal: Ability to disclose and discuss suicidal ideas will improve  Description: Ability to disclose and discuss suicidal ideas will improve  Outcome: Ongoing  Goal: Able to verbalize support systems  Description: Able to verbalize support systems  Outcome: Ongoing  Goal: Absence of self-harm  Description: Absence of self-harm  Outcome: Ongoing  Goal: Patient specific goal  Description: Patient specific goal  Outcome: Ongoing  Goal: Participates in care planning  Description: Participates in care planning  Outcome: Ongoing     Problem: Altered Mood, Deterioration in Function:  Goal: Ability to perform activities of daily living will improve  Description: Ability to perform activities of daily living will improve  Outcome: Ongoing  Goal: Able to verbalize reality based thinking  Description: Able to verbalize reality based thinking  Outcome: Ongoing  Goal: Skin appearance normal  Description: Skin appearance normal  Outcome: Ongoing  Goal: Maintenance of adequate nutrition will improve  Description: Maintenance of adequate nutrition will improve  Outcome: Ongoing  Goal: Ability to tolerate increased activity will improve  Description: Ability to tolerate increased activity will improve  Outcome: Ongoing  Goal: Participates in care planning  Description: Participates in care planning  Outcome: Ongoing  Goal: Patient specific goal  Description: Patient specific goal  Outcome: Ongoing     Problem: Risk of Harm:  Goal: Ability to remain free from injury will improve  Description: Ability to remain free from injury will improve  Outcome: Ongoing     Problem: Altered Mood, Manic Behavior:  Goal: Able to sleep  Description: Able to sleep  Outcome: Ongoing  Goal: Able to verbalize decrease in frequency and intensity of racing thoughts  Description: Able to verbalize decrease in frequency and intensity of racing thoughts  Outcome: Ongoing  Goal: Ability to disclose and discuss suicidal ideas will improve  Description: Ability to disclose and discuss suicidal ideas will improve  Outcome: Ongoing  Goal: Absence of self-harm  Description: Absence of self-harm  Outcome: Ongoing  Goal: Ability to achieve adequate nutritional intake will improve  Description: Ability to achieve adequate nutritional intake will improve  Outcome: Ongoing  Goal: Ability to interact with others will improve  Description: Ability to interact with others will improve  Outcome: Ongoing  Goal: Ability to demonstrate self-control will improve  Description: Ability to demonstrate self-control will improve  Outcome: Ongoing  Goal: Mood stable  Description: Mood stable  Outcome: Ongoing  Goal: Patient specific goal  Description: Patient specific goal  Outcome: Ongoing     Problem: Altered Mood, Psychotic Behavior:  Goal: Able to demonstrate trust by eating, participating in treatment and following staff's direction  Description: Able to demonstrate trust by eating, participating in treatment and following staff's direction  Outcome: Ongoing  Goal: Able to verbalize decrease in frequency and intensity of hallucinations  Description: Able to verbalize decrease in frequency and intensity of hallucinations  Outcome: Ongoing  Goal: Able to verbalize reality based thinking  Description: Able to verbalize reality based thinking  Outcome: Ongoing  Goal: Absence of self-harm  Description: Absence of self-harm  Outcome: Ongoing  Goal: Ability to achieve adequate nutritional intake will improve  Description: Ability to achieve adequate nutritional intake will improve  Outcome: Ongoing  Goal: Ability to interact with others will improve  Description: Ability to interact with others will improve  Outcome: Ongoing  Goal: Compliance with prescribed medication regimen will improve  Description: Compliance with prescribed medication regimen will improve  Outcome: Ongoing  Goal: Patient specific goal  Description: Patient specific goal  Outcome: Ongoing     Problem: Substance Abuse:  Goal: Absence of drug withdrawal signs and symptoms  Description: Absence of drug withdrawal signs and symptoms  Outcome: Ongoing  Goal: Participates in care planning  Description: Participates in care planning  Outcome: Ongoing  Goal: Patient specific goal  Description: Patient specific goal  Outcome: Ongoing

## 2020-12-17 LAB
ALBUMIN SERPL-MCNC: 3.6 GM/DL (ref 3.4–5)
ALP BLD-CCNC: 53 IU/L (ref 40–129)
ALT SERPL-CCNC: 13 U/L (ref 10–40)
ANION GAP SERPL CALCULATED.3IONS-SCNC: 4 MMOL/L (ref 4–16)
AST SERPL-CCNC: 15 IU/L (ref 15–37)
BILIRUB SERPL-MCNC: 0.6 MG/DL (ref 0–1)
BUN BLDV-MCNC: 30 MG/DL (ref 6–23)
CALCIUM SERPL-MCNC: 8.8 MG/DL (ref 8.3–10.6)
CHLORIDE BLD-SCNC: 104 MMOL/L (ref 99–110)
CHOLESTEROL: 119 MG/DL
CO2: 34 MMOL/L (ref 21–32)
CREAT SERPL-MCNC: 1.3 MG/DL (ref 0.9–1.3)
ESTIMATED AVERAGE GLUCOSE: 140 MG/DL
GFR AFRICAN AMERICAN: >60 ML/MIN/1.73M2
GFR NON-AFRICAN AMERICAN: 54 ML/MIN/1.73M2
GLUCOSE BLD-MCNC: 183 MG/DL (ref 70–99)
HBA1C MFR BLD: 6.5 % (ref 4.2–6.3)
HCT VFR BLD CALC: 39.3 % (ref 42–52)
HDLC SERPL-MCNC: 37 MG/DL
HEMOGLOBIN: 12.2 GM/DL (ref 13.5–18)
LDL CHOLESTEROL DIRECT: 64 MG/DL
MCH RBC QN AUTO: 25.3 PG (ref 27–31)
MCHC RBC AUTO-ENTMCNC: 31 % (ref 32–36)
MCV RBC AUTO: 81.4 FL (ref 78–100)
PDW BLD-RTO: 16.5 % (ref 11.7–14.9)
PLATELET # BLD: 221 K/CU MM (ref 140–440)
PMV BLD AUTO: 10.5 FL (ref 7.5–11.1)
POTASSIUM SERPL-SCNC: 3.6 MMOL/L (ref 3.5–5.1)
RBC # BLD: 4.83 M/CU MM (ref 4.6–6.2)
SARS-COV-2: NOT DETECTED
SODIUM BLD-SCNC: 142 MMOL/L (ref 135–145)
SOURCE: NORMAL
TOTAL PROTEIN: 6.2 GM/DL (ref 6.4–8.2)
TRIGL SERPL-MCNC: 82 MG/DL
WBC # BLD: 7.2 K/CU MM (ref 4–10.5)

## 2020-12-17 PROCEDURE — 90792 PSYCH DIAG EVAL W/MED SRVCS: CPT | Performed by: NURSE PRACTITIONER

## 2020-12-17 PROCEDURE — 36415 COLL VENOUS BLD VENIPUNCTURE: CPT

## 2020-12-17 PROCEDURE — 85027 COMPLETE CBC AUTOMATED: CPT

## 2020-12-17 PROCEDURE — 80053 COMPREHEN METABOLIC PANEL: CPT

## 2020-12-17 PROCEDURE — 83036 HEMOGLOBIN GLYCOSYLATED A1C: CPT

## 2020-12-17 PROCEDURE — 80061 LIPID PANEL: CPT

## 2020-12-17 PROCEDURE — 1240000000 HC EMOTIONAL WELLNESS R&B

## 2020-12-17 PROCEDURE — 6370000000 HC RX 637 (ALT 250 FOR IP): Performed by: INTERNAL MEDICINE

## 2020-12-17 PROCEDURE — 6370000000 HC RX 637 (ALT 250 FOR IP): Performed by: PSYCHIATRY & NEUROLOGY

## 2020-12-17 PROCEDURE — 83721 ASSAY OF BLOOD LIPOPROTEIN: CPT

## 2020-12-17 RX ADMIN — LOSARTAN POTASSIUM 25 MG: 25 TABLET ORAL at 08:26

## 2020-12-17 RX ADMIN — SERTRALINE HYDROCHLORIDE 50 MG: 50 TABLET ORAL at 08:26

## 2020-12-17 RX ADMIN — ARIPIPRAZOLE 5 MG: 5 TABLET ORAL at 20:11

## 2020-12-17 RX ADMIN — APIXABAN 5 MG: 5 TABLET, FILM COATED ORAL at 20:11

## 2020-12-17 RX ADMIN — TRAZODONE HYDROCHLORIDE 50 MG: 50 TABLET ORAL at 20:11

## 2020-12-17 RX ADMIN — METOPROLOL TARTRATE 50 MG: 50 TABLET, FILM COATED ORAL at 20:11

## 2020-12-17 RX ADMIN — METOPROLOL TARTRATE 50 MG: 50 TABLET, FILM COATED ORAL at 08:26

## 2020-12-17 RX ADMIN — ASPIRIN 81 MG: 81 TABLET, COATED ORAL at 08:26

## 2020-12-17 RX ADMIN — AMLODIPINE BESYLATE 10 MG: 10 TABLET ORAL at 08:22

## 2020-12-17 ASSESSMENT — PAIN SCALES - GENERAL
PAINLEVEL_OUTOF10: 0
PAINLEVEL_OUTOF10: 0

## 2020-12-17 NOTE — PROGRESS NOTES
Attempted to complete pt's assessment at ~1458. Pt sleeping and did not respond to attempts to wake up. Therapist will attempt again at a later time.      Electronically signed by Matthew Zuñiga [address] [place] [address], [place] on 12/17/2020 at 2:59 PM

## 2020-12-17 NOTE — GROUP NOTE
Group Therapy Note    Date: 12/17/2020    Group Start Time: 1100  Group End Time: 0169  Group Topic: Psychoeducation    [address]    GABINO Feliciano, MA        Group Therapy Note    Attendees: 3/5       Notes:  Pts participated in recreational therapy group; Tsering Brown of the Draw. Pts answered questions r/t random cards they picked off the topic of the discussion. Discussion centered how the activity could make people anxious and ways they managed their anxiety during the activity. Pt working with SW during group time.        Discipline Responsible: Certified Therapeutic Recreation Specialist       Signature:  Esequiel Estes [place], [place]

## 2020-12-17 NOTE — GROUP NOTE
Group Therapy Note    Date: 12/17/2020    Group Start Time: 1400  Group End Time: 1440  Group Topic: Psychotherapy    [address] [address]    Ree Hammans, LSW        Group Therapy Note    Attendees: 2/4        Notes: Patient invited and declined to attend. Encouraged pt to attend.         Discipline Responsible: /Counselor      Signature:  ISRRAEL Wolfe

## 2020-12-17 NOTE — PLAN OF CARE
5 [facility]  Initial Interdisciplinary Treatment Plan NOTE    Review Date & Time: 12/17 0915    Admission Type:   Admission Type: Voluntary    Reason for admission:  Reason for Admission: \"to get my mind right\"    Patient Admission Diagnosis: Major depressive disorder, recurrent episode, severe, with psychosis      PATIENT STRENGTHS:  Patient Strengths Strengths: Motivated, Positive Support  Patient Strengths and Limitations:Limitations: General negative or hopeless attitude about future/recovery  Addictive Behavior:Addictive Behavior  In the past 3 months, have you felt or has someone told you that you have a problem with:  : None  Do you have a history of Chemical Use?: No  Do you have a history of Alcohol Use?: No  Do you have a history of Street Drug Abuse?: No  Histroy of Prescripton Drug Abuse?: No  Medical Problems:No past medical history on file.     EDUCATION:   Learner Progress Toward Treatment Goals: Reviewed goals and plan of care    Method: Group    Outcome: Verbalized understanding    PATIENT GOALS: med titration, return to home    PLAN/TREATMENT RECOMMENDATIONS UPDATE: med titration, participate in unit group programming    Estimated Length of Stay Update:  12 days  Estimated Discharge Date Update: 12/28/20  Discharge Criteria: med titration and complying with unit programming    SHORT-TERM GOALS UPDATE:   Time frame for Short-Term Goals: 7 days    LONG-TERM GOALS UPDATE:   Time frame for Long-Term Goals: 12 days  Members Present in Team Meeting: See Signature Sheet      ISRRAEL Hollingsworth

## 2020-12-17 NOTE — CARE COORDINATION
DISCHARGE BARRIERS     Reason for Referral: [facility] initial assessment  Mental Status: Oriented  Decision Making Ability: Yes  Family/Social/Home Environment: spoke with patient  who states he lives alone in an apartment with minimal support from others   Current Services/ Equipment: None  Patient Income source: Patient receives SSI and reported that income meets expenses. Concerns or Barriers to Discharge: no  Patient and/or family verbalized understanding of the plan of care and contributed to goal setting.      Social/ Functional History  Lives With: Alone  Type of Home: Apartment  Home Layout: one level  Home Access: Lives on second floor of apartment building, has 15- 20 steps to climb to get to door  Bathroom Shower/ Tub: Tub/shower unit  Bathroom Toilet: Standard  Other Bathroom Assistive Devices: None  Home Equipment: None  ADL Assistance: [facility]: Independent  Ambulation Assistance: Independent  Transfer Assistance: Independent  Additional Comments:     Anticipated Needs per Patient:   Potential Assistance Needed: Mental health follow up appointment  Type of Home Care Services: None  Patient expects to be discharged to: home to apartment, alone     Discharge Plan:  Possible placement needed     ISRRAEL Devine

## 2020-12-17 NOTE — BH NOTE
Giselle Henry was visible on the milieu. He spent time at the nurses station talking, non-stop. He is polite and friendly. He was focused on when he will be discharged. Discussed with him that he has not seen the doctor yet. Explained that he will see the doctor tomorrow morning and he should speak with the doctor about discharge. He agreed. He denied psych symptoms. Had a snack. Medication was reviewed with him, he voiced understanding and was compliant with medication whole. Later, he came to this writer and asked what was on the ceiling. There was nothing on the ceiling. He was asked what he saw. Giselle Henry reported that it looked like grains coming down from the ceiling onto the floor. At that time a staff member came through the door where he \"saw\" grains on the floor. Explained to him that the staff member walked through there and they did not step on anything. He had a confused look on his face. He spent more time talking and watched TV for a short time then went to bed. Will continue to monitor for safety.

## 2020-12-17 NOTE — PROGRESS NOTES
Pt alert and oriented during the shift. Pt has been med compliant and is tolerating meals and fluids w/o difficulty. Pt stated earlier in the shift that he had to make a phone call to check on his brother. Pt states that he had a nightmare that his brothers had been killed and that he might be next. Pt states he realizes that it was not true and is comfortable going back to his apartment. Pt has denied hallucinations, SI, anxiety and depression. Pt is ambulatory and continent.

## 2020-12-17 NOTE — PLAN OF CARE
Problem: Falls - Risk of:  Goal: Will remain free from falls  Description: Will remain free from falls  12/16/2020 2142 by Nell Mejia RN  Outcome: Ongoing  12/16/2020 1816 by Maury Heredia RN  Outcome: Ongoing  Goal: Absence of physical injury  Description: Absence of physical injury  12/16/2020 2142 by Nell Mejia RN  Outcome: Ongoing  12/16/2020 1816 by Maury Heredia RN  Outcome: Ongoing

## 2020-12-17 NOTE — H&P
Initial Psychiatric History and Physical    Susie Hernández  [id number]  12/16/2020 12/17/20    ID: Patient is a 65 yrs y.o. male    CC: \"I was having headaches, losing my balance, my legs got weak. \"    HPI: Patient noted a fall on 12/11/2020 and came to [facility]. He denied any symptoms of chest pain, SOB prior to this. Patient is poor historian, kept saying though interview  \"I am going to die, what is the point of all these? \" kept saying death is near, would not say why he felt so. He denies any suicidal ideation or homicidal ideation. Patient notes there has been very generalized weakness and fatigue in the past couple of days, denies an fever or chills, no chest pain, palpitations , dizziness, cough or shortness of breath, no nausea, vomiting or urinary symptoms. Patient was seen in consultation on 12/13/2020 where the following was noted:      During today's interview, the patient was alert & oriented x 3. He denies SI/HI. He endorses visual hallucinations  of figures but is not able to be more specific. He denies auditory hallucinations. He denies depression. He  endorses anxiety which he rates as \"5\" on a scale of 0 to 10 with 0 being none and 10 being horrible. Mood  appears to be somewhat elevated. States he has been sleeping \"OK. \" Notes that his appetite is poor. States he  was diagnosed with Paranoid Schizophrenia and Bipolar disorder in the past. Notes that he attempted suicide  by overdose late 1960's-early 1970's. States that he was hospitalized at [facility] in [place] at that  time for one month due to Soosalu nervous breakdown. \"  Patient states that he once \"shook Ladean Prier hand\"  Pt was polite and cordial during the interview process.       Due to patient being a poor historian, collateral information was obtained from his brother Christine Pillai ([phone]) who stated that the patient has always \"kept to himself and not socialized much. \" Notes that he lives in his own apartment and has been on [address] for a long time. Was not sure that patient had been diagnosed with Paranoid Schizophrenia or Bipolar disorder, but stated that he spent much of his younger years \"wandering around the country. \" Noted that just recently the patient has been expressing fears that he can no longer take care of himself and wants to leave his apartment where he has been very happy for the past 30 years. During today's interview, the patient was alert & oriented x 3. He denies SI/HI, but continues to endorse both auditory and visual hallucinations. States he see things on the wall that are a \"mixture of animals and people. \" Notes that he hears \"buzzing and someone calling my name over and over. \" Denies any commands. He denies depression and anxiety. States that his sleep is \"fair - I sleep about 6 hours a night. It takes me about one hour to fall asleep. \" States that his appetite is \"fine. \" Voices the delusion that \"God parted the crowd for me when I was in [address], May of [birth year] so I could walk right through the crowd up to the front and shake Reji Bahena's hand. \"    He has been  x 2 and  x 2. He does not have any children. Highest level of education was 9th grade. Pt noted he currently feels safe and comfortable on the unit. Pt was in agreement with treatment team.  Pt was polite and cordial during the interview process. Pt denied any hx of seizures, TBIs, Hep C or HIV  No TD noted, AIMS=0    Pt noted hx of previous inpt psychiatric admission  Pt denied any previous suicide attempts  Pt denied any family hx of suicides  Pt denied any family mental health hx  Pt denied any hx of abuse trauma or neglect.       Alcohol: denies any current  Street drugs: denies any current  Tobacco: none  Caffeine: 1 cup per day    Past Psychiatric History:   See note above    Family Psychiatric History:   None known    Family Psychiatric History:   Family History   Problem Relation Age of Onset    Heart Disease Mother     No Known Problems Father     Alcohol Abuse Sister     Other Sister          from cirrhosis    Kidney Disease Brother     Heart Attack Brother         Allergies:  No Known Allergies     OBJECTIVE  Vital Signs:  Vitals:    20 0822   BP: 137/77   Pulse: 77   Resp: 16   Temp: 98 °F (36.7 °C)   SpO2: 97%       Labs:  Recent Results (from the past 48 hour(s))   COVID-19    Collection Time: 20  3:00 PM    Specimen: Nasopharyngeal Swab   Result Value Ref Range    Source UNKNOWN     SARS-CoV-2, NAAT NOT DETECTED    Comprehensive Metabolic Panel w/ Reflex to MG    Collection Time: 20  9:00 AM   Result Value Ref Range    Sodium 142 135 - 145 MMOL/L    Potassium 3.6 3.5 - 5.1 MMOL/L    Chloride 104 99 - 110 mMol/L    CO2 34 (H) 21 - 32 MMOL/L    BUN 30 (H) 6 - 23 MG/DL    CREATININE 1.3 0.9 - 1.3 MG/DL    Glucose 183 (H) 70 - 99 MG/DL    Calcium 8.8 8.3 - 10.6 MG/DL    Alb 3.6 3.4 - 5.0 GM/DL    Total Protein 6.2 (L) 6.4 - 8.2 GM/DL    Total Bilirubin 0.6 0.0 - 1.0 MG/DL    ALT 13 10 - 40 U/L    AST 15 15 - 37 IU/L    Alkaline Phosphatase 53 40 - 129 IU/L    GFR Non- 54 (L) >60 mL/min/1.73m2    GFR African American >60 >60 mL/min/1.73m2    Anion Gap 4 4 - 16   CBC    Collection Time: 20  9:00 AM   Result Value Ref Range    WBC 7.2 4.0 - 10.5 K/CU MM    RBC 4.83 4.6 - 6.2 M/CU MM    Hemoglobin 12.2 (L) 13.5 - 18.0 GM/DL    Hematocrit 39.3 (L) 42 - 52 %    MCV 81.4 78 - 100 FL    MCH 25.3 (L) 27 - 31 PG    MCHC 31.0 (L) 32.0 - 36.0 %    RDW 16.5 (H) 11.7 - 14.9 %    Platelets 061 875 - 516 K/CU MM    MPV 10.5 7.5 - 11.1 FL   Lipid panel - fasting    Collection Time: 20  9:00 AM   Result Value Ref Range    Triglycerides 82 <150 MG/DL    Cholesterol 119 <200 MG/DL    HDL 37 (L) >40 MG/DL    LDL Direct 64 <100 MG/DL       Review of Systems:  Reports of no current cardiovascular, respiratory, gastrointestinal, genitourinary, integumentary, neurological, muscuoskeletal, or immunological symptoms today. PSYCHIATRIC: See HPI above. Neurologic examination:  Mental status: The patient is alert, attentive, and oriented. Speech is clear and fluent with good repetition, comprehension, and naming. Cranial nerves:  CN II: Visual fields are full to confrontation. Pupils are 4 mm and briskly reactive to light. CN III, IV, VI: At primary gaze, there is no eye deviation. EOMs intact. CN V: Facial sensation is intact to pinprick in all 3 divisions bilaterally. Corneal responses are intact. CN VII: Face is symmetric with normal eye closure and smile. CN VII: Hearing is decreased to rubbing fingers, vibratory sense intact    CN IX, X: Palate elevates symmetrically. Phonation is normal.    CN XI: Head turning and shoulder shrug are intact    CN XII: Tongue is midline with normal movements and no atrophy. Motor: There is no pronator drift of out-stretched arms. Muscle bulk and tone are normal. Strength is full bilaterally. Reflexes:  Reflexes are 2+ and symmetric at the biceps, triceps, knees, and ankles. Plantar responses are flexor. Sensory:  Light touch, pinprick, position sense, and vibration sense are intact in fingers. Coordination:  Rapid alternating movements and fine finger movements are intact. There is no dysmetria on finger-to-nose and heel-knee-shin. There are no abnormal or extraneous movements. Romberg is absent.     Gait/Stance:  Not assessed      PSYCHIATRIC EXAMINATION / MENTAL STATUS EXAM    CONSTITUTIONAL:    Vitals:   Vitals:    12/17/20 0822   BP: 137/77   Pulse: 77   Resp: 16   Temp: 98 °F (36.7 °C)   SpO2: 97%      General appearance: [x] appears age, []  appears older than stated age,               []  adequately dressed and groomed, [x] disheveled,               [x]  in no acute distress, [] appears mildly distressed, [] other           MUSCULOSKELETAL:   Gait:   [] normal, [] antalgic, [] unsteady, [x] gait not evaluated   Station:             [] erect, [x] sitting, [] recumbent, [] other        Strength/tone:  [x] muscle strength and tone appear consistent with age and condition     [] atrophy      [] abnormal movements  PSYCHIATRIC:    Appearance: Appears stated age. Alert and oriented to person, place, time & situation. No acute distress. Adequate grooming and hygiene. Good eye contact. No prominent physical abnormalities. Attitude: Manner is cooperative and pleasant  Motor: No psychomotor agitation, retardation or abnormal movements noted  Speech: Clearly articulated; normal rate, volume, tone & amount. Language: intact understanding and production  Mood: euthymic  Affect: animated, elevated mood, full range, non-labile, congruent with mood and content of speech  Thought Production: Spontaneous. Thought Form: Coherent, linear, logical & goal-directed. No tangentiality or circumstantiality. No flight of ideas or loosening of associations. Thought Content/Perceptions: No YOSELIN, no AVH, no delusion  Insight: poor  Judgment: poor  Memory: Immediate, recent, and remote appear intact, though not formally tested. Attention: maintained throughout interview  Fund of knowledge: Average  Gait/Balance: not assessed    Impression:   Major depressive disorder, recurrent episode, severe with psychosis  ? Schizoaffective disorder    Problem List:   Major depressive disorder, recurrent episode, severe, with psychosis ([facility] [address]    Plan:  1. Admit to [place] [address] Unit  2. Consult Hospitalist to evaluate and treat medical conditions  3. Adjust psychotropic medications to target symptoms  4. Occupational Therapy, Physical Therapy, Group Psychotherapy as tolerated  5. Reviewed treatment plan with patient including medication risks, benefits, side effects. Obtained informed consent for treatment. 6. Anticipated length of stay 10-12 days  7.  I certify that inpatient psychiatric hospital admission is medically necessary for treatment, which can be expected to improve the patient's condition, and/or for diagnostic study. 8. Psychiatric management:medication initiation and titration, group and individual therapy, safe and therapeutic environment. 9. Status of problem/condition: Improving  10. Medical co-morbidities: Management per [facility] group, appreciate assistance  11. Legal Status: Pending  12. The treatment team reviewed with the patient the diagnosis and treatment recommendations to include the risks, benefits, and side effects of chosen medications. 13. The patient verbalized understanding and agreed with the treatment regimen as outlined above. 14. The patient was encouraged to participate in groups. 15. Medical records, Labs, Diagnotic tests reviewed  16. q15 min safety checks for safety  17. Interval History. 18. Review current labs  19. Continue current medications  20. Supportive Therapy Provided  21. Pt had an opportunity to ask questions and address concerns  22. Pt encouraged to continue therapy group or individual.  23. Pt was in agreement with treatment plan. 24. The risks benefits and side effects of medications were discussed with the patient, including alternatives and no treatment.     Electronically signed by DESIREE Hester CNP on 12/17/2020 at 2:59 PM

## 2020-12-18 PROCEDURE — 6370000000 HC RX 637 (ALT 250 FOR IP): Performed by: NURSE PRACTITIONER

## 2020-12-18 PROCEDURE — 1240000000 HC EMOTIONAL WELLNESS R&B

## 2020-12-18 PROCEDURE — 6370000000 HC RX 637 (ALT 250 FOR IP): Performed by: PSYCHIATRY & NEUROLOGY

## 2020-12-18 PROCEDURE — 97165 OT EVAL LOW COMPLEX 30 MIN: CPT

## 2020-12-18 PROCEDURE — 6370000000 HC RX 637 (ALT 250 FOR IP): Performed by: INTERNAL MEDICINE

## 2020-12-18 PROCEDURE — 97162 PT EVAL MOD COMPLEX 30 MIN: CPT

## 2020-12-18 PROCEDURE — 97530 THERAPEUTIC ACTIVITIES: CPT

## 2020-12-18 PROCEDURE — 99232 SBSQ HOSP IP/OBS MODERATE 35: CPT | Performed by: NURSE PRACTITIONER

## 2020-12-18 RX ORDER — POLYETHYLENE GLYCOL 3350 17 G/17G
17 POWDER, FOR SOLUTION ORAL ONCE
Status: COMPLETED | OUTPATIENT
Start: 2020-12-18 | End: 2020-12-18

## 2020-12-18 RX ORDER — ALOGLIPTIN 12.5 MG/1
25 TABLET, FILM COATED ORAL DAILY
Status: DISCONTINUED | OUTPATIENT
Start: 2020-12-18 | End: 2020-12-18

## 2020-12-18 RX ORDER — ARIPIPRAZOLE 10 MG/1
10 TABLET ORAL NIGHTLY
Status: DISCONTINUED | OUTPATIENT
Start: 2020-12-18 | End: 2020-12-20

## 2020-12-18 RX ORDER — ALOGLIPTIN 12.5 MG/1
12.5 TABLET, FILM COATED ORAL DAILY
Status: DISCONTINUED | OUTPATIENT
Start: 2020-12-18 | End: 2020-12-28 | Stop reason: HOSPADM

## 2020-12-18 RX ORDER — DOCUSATE SODIUM 100 MG/1
100 CAPSULE, LIQUID FILLED ORAL ONCE
Status: COMPLETED | OUTPATIENT
Start: 2020-12-18 | End: 2020-12-18

## 2020-12-18 RX ADMIN — APIXABAN 5 MG: 5 TABLET, FILM COATED ORAL at 20:54

## 2020-12-18 RX ADMIN — POLYETHYLENE GLYCOL (3350) 17 G: 17 POWDER, FOR SOLUTION ORAL at 08:02

## 2020-12-18 RX ADMIN — LOSARTAN POTASSIUM 25 MG: 25 TABLET ORAL at 08:02

## 2020-12-18 RX ADMIN — APIXABAN 5 MG: 5 TABLET, FILM COATED ORAL at 08:02

## 2020-12-18 RX ADMIN — SERTRALINE HYDROCHLORIDE 50 MG: 50 TABLET ORAL at 08:02

## 2020-12-18 RX ADMIN — METOPROLOL TARTRATE 50 MG: 50 TABLET, FILM COATED ORAL at 20:54

## 2020-12-18 RX ADMIN — ARIPIPRAZOLE 10 MG: 10 TABLET ORAL at 20:54

## 2020-12-18 RX ADMIN — TRAZODONE HYDROCHLORIDE 50 MG: 50 TABLET ORAL at 20:54

## 2020-12-18 RX ADMIN — ALOGLIPTIN 12.5 MG: 12.5 TABLET, FILM COATED ORAL at 17:46

## 2020-12-18 RX ADMIN — METOPROLOL TARTRATE 50 MG: 50 TABLET, FILM COATED ORAL at 08:02

## 2020-12-18 RX ADMIN — POLYETHYLENE GLYCOL (3350) 17 G: 17 POWDER, FOR SOLUTION ORAL at 21:33

## 2020-12-18 RX ADMIN — AMLODIPINE BESYLATE 10 MG: 10 TABLET ORAL at 08:02

## 2020-12-18 RX ADMIN — DOCUSATE SODIUM 100 MG: 100 CAPSULE ORAL at 21:33

## 2020-12-18 RX ADMIN — ASPIRIN 81 MG: 81 TABLET, COATED ORAL at 08:02

## 2020-12-18 ASSESSMENT — PAIN SCALES - GENERAL
PAINLEVEL_OUTOF10: 0
PAINLEVEL_OUTOF10: 0

## 2020-12-18 NOTE — PLAN OF CARE
Problem: Falls - Risk of:  Goal: Will remain free from falls  Description: Will remain free from falls  12/17/2020 2205 by Ashwin Trent RN  Outcome: Ongoing  12/17/2020 0912 by Jay Guajardo RN  Outcome: Ongoing  Goal: Absence of physical injury  Description: Absence of physical injury  12/17/2020 2205 by Ashwin Trent RN  Outcome: Ongoing  12/17/2020 0912 by Jay Guajardo RN  Outcome: Ongoing

## 2020-12-18 NOTE — PLAN OF CARE
Problem: Falls - Risk of:  Goal: Will remain free from falls  Description: Will remain free from falls  12/18/2020 0842 by Lois Parrish RN  Outcome: Ongoing  12/17/2020 2205 by China Cohen RN  Outcome: Ongoing  Goal: Absence of physical injury  Description: Absence of physical injury  12/18/2020 0842 by Lois Parrish RN  Outcome: Ongoing  12/17/2020 2205 by China Cohen RN  Outcome: Ongoing     Problem: Anger Management/Homicidal Ideation:  Goal: Able to display appropriate communication and problem solving  Description: Able to display appropriate communication and problem solving  12/18/2020 0842 by Lois Parrish RN  Outcome: Ongoing  12/17/2020 2205 by China Cohen RN  Outcome: Ongoing  Goal: Ability to verbalize frustrations and anger appropriately will improve  Description: Ability to verbalize frustrations and anger appropriately will improve  12/18/2020 0842 by Lois Parrish RN  Outcome: Ongoing  12/17/2020 2205 by China Cohen RN  Outcome: Ongoing  Goal: Absence of angry outbursts  Description: Absence of angry outbursts  12/18/2020 0842 by Lois Parrish RN  Outcome: Ongoing  12/17/2020 2205 by China Cohen RN  Outcome: Ongoing  Goal: Absence of homicidal ideation  Description: Absence of homicidal ideation  12/18/2020 0842 by Lois Parrish RN  Outcome: Ongoing  12/17/2020 2205 by China Cohen RN  Outcome: Ongoing  Goal: Participates in care planning  Description: Participates in care planning  12/18/2020 0842 by Lois Parrish RN  Outcome: Ongoing  12/17/2020 2205 by China Cohen RN  Outcome: Ongoing  Goal: Patient specific goal  Description: Patient specific goal  12/18/2020 0842 by Lois Parrish RN  Outcome: Ongoing  12/17/2020 2205 by China Cohen RN  Outcome: Ongoing     Problem: Altered Mood, Depressive Behavior:  Goal: Able to verbalize acceptance of life and situations over which he or she has no control  Description: Able to verbalize Able to verbalize and/or display a decrease in depressive symptoms  12/18/2020 0842 by Venessa Norman RN  Outcome: Ongoing  12/17/2020 2205 by Sami Serrano RN  Outcome: Ongoing  Goal: Ability to disclose and discuss suicidal ideas will improve  Description: Ability to disclose and discuss suicidal ideas will improve  12/18/2020 0842 by Venessa Norman RN  Outcome: Ongoing  12/17/2020 2205 by Sami Serrano RN  Outcome: Ongoing  Goal: Able to verbalize support systems  Description: Able to verbalize support systems  12/18/2020 0842 by Venessa Norman RN  Outcome: Ongoing  12/17/2020 2205 by Sami Serrano RN  Outcome: Ongoing  Goal: Absence of self-harm  Description: Absence of self-harm  12/18/2020 0842 by Venessa Norman RN  Outcome: Ongoing  12/17/2020 2205 by Sami Serrano RN  Outcome: Ongoing  Goal: Patient specific goal  Description: Patient specific goal  12/18/2020 0842 by Venessa Norman RN  Outcome: Ongoing  12/17/2020 2205 by Sami Serrano RN  Outcome: Ongoing  Goal: Participates in care planning  Description: Participates in care planning  12/18/2020 0842 by Venessa Norman RN  Outcome: Ongoing  12/17/2020 2205 by Sami Serrano RN  Outcome: Ongoing     Problem: Altered Mood, Deterioration in Function:  Goal: Ability to perform activities of daily living will improve  Description: Ability to perform activities of daily living will improve  12/18/2020 0842 by Venessa Norman RN  Outcome: Ongoing  12/17/2020 2205 by Sami Serrano RN  Outcome: Ongoing  Goal: Able to verbalize reality based thinking  Description: Able to verbalize reality based thinking  12/18/2020 0842 by Venessa Norman RN  Outcome: Ongoing  12/17/2020 2205 by Sami Serrano RN  Outcome: Ongoing  Goal: Skin appearance normal  Description: Skin appearance normal  12/18/2020 0842 by Venessa Norman RN  Outcome: Ongoing  12/17/2020 2205 by Sami Serrano RN  Outcome: Ongoing  Goal: Maintenance of adequate nutrition will improve  Description: Maintenance of adequate nutrition will improve  12/18/2020 0842 by Kasey Bhardwaj RN  Outcome: Ongoing  12/17/2020 2205 by Teddy Burdick RN  Outcome: Ongoing  Goal: Ability to tolerate increased activity will improve  Description: Ability to tolerate increased activity will improve  12/18/2020 0842 by Kasey Bhardwaj RN  Outcome: Ongoing  12/17/2020 2205 by Teddy Burdick RN  Outcome: Ongoing  Goal: Participates in care planning  Description: Participates in care planning  12/18/2020 0842 by Kasey Bhardwaj RN  Outcome: Ongoing  12/17/2020 2205 by Teddy Burdick RN  Outcome: Ongoing  Goal: Patient specific goal  Description: Patient specific goal  12/18/2020 0842 by Kasey Bhardwaj RN  Outcome: Ongoing  12/17/2020 2205 by Teddy Burdick RN  Outcome: Ongoing     Problem: Risk of Harm:  Goal: Ability to remain free from injury will improve  Description: Ability to remain free from injury will improve  12/18/2020 0842 by Kasey Bhardwaj RN  Outcome: Ongoing  12/17/2020 2205 by Teddy Burdick RN  Outcome: Ongoing     Problem: Altered Mood, Manic Behavior:  Goal: Able to sleep  Description: Able to sleep  12/18/2020 0842 by Kasey Bhardwaj RN  Outcome: Ongoing  12/17/2020 2205 by Teddy Burdick RN  Outcome: Ongoing  Goal: Able to verbalize decrease in frequency and intensity of racing thoughts  Description: Able to verbalize decrease in frequency and intensity of racing thoughts  12/18/2020 0842 by Kasey Bhardwaj RN  Outcome: Ongoing  12/17/2020 2205 by Teddy Burdick RN  Outcome: Ongoing  Goal: Ability to disclose and discuss suicidal ideas will improve  Description: Ability to disclose and discuss suicidal ideas will improve  12/18/2020 0842 by Kasey Bhardwaj RN  Outcome: Ongoing  12/17/2020 2205 by Teddy Burdick RN  Outcome: Ongoing  Goal: Absence of self-harm  Description: Absence of self-harm  12/18/2020 0842 by Kasey Bhardwaj RN  Outcome: Ongoing  12/17/2020 2205 by Nasima Piedra RN  Outcome: Ongoing  Goal: Ability to achieve adequate nutritional intake will improve  Description: Ability to achieve adequate nutritional intake will improve  12/18/2020 0842 by Shweta Riggins RN  Outcome: Ongoing  12/17/2020 2205 by Nasima Piedra RN  Outcome: Ongoing  Goal: Ability to interact with others will improve  Description: Ability to interact with others will improve  12/18/2020 0842 by Shweta Riggins RN  Outcome: Ongoing  12/17/2020 2205 by Nasima Piedra RN  Outcome: Ongoing  Goal: Ability to demonstrate self-control will improve  Description: Ability to demonstrate self-control will improve  12/18/2020 0842 by Shweta Riggins RN  Outcome: Ongoing  12/17/2020 2205 by Nasima Piedra RN  Outcome: Ongoing  Goal: Mood stable  Description: Mood stable  12/18/2020 0842 by Shweta Riggins RN  Outcome: Ongoing  12/17/2020 2205 by Nasima Piedra RN  Outcome: Ongoing  Goal: Patient specific goal  Description: Patient specific goal  12/18/2020 0842 by Shweta Riggins RN  Outcome: Ongoing  12/17/2020 2205 by Nasima Piedra RN  Outcome: Ongoing     Problem: Altered Mood, Psychotic Behavior:  Goal: Able to demonstrate trust by eating, participating in treatment and following staff's direction  Description: Able to demonstrate trust by eating, participating in treatment and following staff's direction  12/18/2020 0842 by Shweta Riggins RN  Outcome: Ongoing  12/17/2020 2205 by Nasima Piedra RN  Outcome: Ongoing  Goal: Able to verbalize decrease in frequency and intensity of hallucinations  Description: Able to verbalize decrease in frequency and intensity of hallucinations  12/18/2020 0842 by Shweta Riggins RN  Outcome: Ongoing  12/17/2020 2205 by Nasima Piedra RN  Outcome: Ongoing  Goal: Able to verbalize reality based thinking  Description: Able to verbalize reality based thinking  12/18/2020 0842 by Shweta Riggins RN  Outcome: Ongoing  12/17/2020 2205 by Nasima Piedra RN  Outcome: Ongoing  Goal: Absence of self-harm  Description: Absence of self-harm  12/18/2020 0842 by Shweta Riggins RN  Outcome: Ongoing  12/17/2020 2205 by Nasima Piedra RN  Outcome: Ongoing  Goal: Ability to achieve adequate nutritional intake will improve  Description: Ability to achieve adequate nutritional intake will improve  12/18/2020 0842 by Shweta Riggins RN  Outcome: Ongoing  12/17/2020 2205 by Nasima Piedra RN  Outcome: Ongoing  Goal: Ability to interact with others will improve  Description: Ability to interact with others will improve  12/18/2020 0842 by Shweta Riggins RN  Outcome: Ongoing  12/17/2020 2205 by Nasima Piedra RN  Outcome: Ongoing  Goal: Compliance with prescribed medication regimen will improve  Description: Compliance with prescribed medication regimen will improve  12/18/2020 0842 by Shweta Riggins RN  Outcome: Ongoing  12/17/2020 2205 by Nasima Piedra RN  Outcome: Ongoing  Goal: Patient specific goal  Description: Patient specific goal  12/18/2020 0842 by Shweta Riggins RN  Outcome: Ongoing  12/17/2020 2205 by Nasima Piedra RN  Outcome: Ongoing     Problem: Substance Abuse:  Goal: Absence of drug withdrawal signs and symptoms  Description: Absence of drug withdrawal signs and symptoms  12/18/2020 0842 by Shweta Riggins RN  Outcome: Ongoing  12/17/2020 2205 by Nasima Piedra RN  Outcome: Ongoing  Goal: Participates in care planning  Description: Participates in care planning  12/18/2020 0842 by Shweta Riggins RN  Outcome: Ongoing  12/17/2020 2205 by Nasima Piedra RN  Outcome: Ongoing  Goal: Patient specific goal  Description: Patient specific goal  12/18/2020 0842 by Shweta Riggins RN  Outcome: Ongoing  12/17/2020 2205 by Nasima Piedra RN  Outcome: Ongoing

## 2020-12-18 NOTE — GROUP NOTE
Group Therapy Note    Date: 12/18/2020    Group Start Time: 1030  Group End Time: 6134  Group Topic: Psychotherapy/ [address]    ISRRAEL Padilla        Group Therapy Note    Attendees: 3/4         Notes:  Patient present and participated in group, sharing. Status After Intervention:  Improved    Participation Level:  Active Listener and Interactive    Participation Quality: Attentive and Sharing      Speech:  normal      Thought Process/Content: Logical      Affective Functioning: Congruent      Mood: euthymic      Level of consciousness:  Alert and Attentive      Response to Learning: Able to verbalize current knowledge/experience and Capable of insight      Endings: None Reported    Modes of Intervention: Education, Support, Socialization and Exploration      Discipline Responsible: /Counselor      Signature:  ISRRAEL Saldana

## 2020-12-18 NOTE — GROUP NOTE
Group Therapy Note    Date: 12/18/2020    Group Start Time: 1300  Group End Time: 8065     Number of Participants: 2/2    Type: Spirituality    Group Topic/Objective: Religous discussion with Rishi Pineda. Notes:  Pt participated actively in the discussion. Pt noted to socialize appropriately with the . Status After Intervention:  Improved    Participation Level:  Active Listener and Interactive    Participation Quality: Appropriate, Attentive and Sharing    Speech:  normal    Thought Process/Content: Logical    Affective Functioning: Congruent    Mood: Bright    Level of consciousness:  Alert and Attentive    Response to Learning: Able to verbalize current knowledge/experience and Able to verbalize/acknowledge new learning    Endings: None Reported    Modes of Intervention: Education, Support and Socialization    Discipline Responsible: Certified Therapeutic Recreation Specialist     Electronically signed by Shanna Goldberg MA on 12/18/2020 at 2:08 PM

## 2020-12-18 NOTE — GROUP NOTE
Group Therapy Note    Date: 12/18/2020    Group Start Time: 0830  Group End Time: 0900    Number of Participants: 4/4    Type: Morning Goals Group/ Community Meeting    Group Topic/Objective: Set Goal For The Day and to review Unit Rules and Regulations. Patient's Goal:  Pt states his goal is \"Make it through another day. \"    Notes:  Pt presented as cooperative during group. Pt noted to require Min prompting for appropriate conversation topics d/t pt wanting to discuss dream where brother was killed in a very detailed manner. Pt states he is feeling \"fine\" and is grateful \"that I'm still alive. \"  Pt reports restful sleep of approximately 6-7 hours. Depression (0-10): 0    Anxiety (0-10): 0    Irritability/Aggitation (0-10): 0    Status After Intervention:  Improved    Participation Level:  Active Listener and Interactive    Participation Quality: Attentive and Sharing    Speech:  normal    Thought Process/Content: Logical    Affective Functioning: Congruent    Mood: Bright    Level of consciousness:  Alert and Attentive    Response to Learning: Able to verbalize current knowledge/experience    Endings: None Reported    Modes of Intervention: Education, Support and Socialization    Discipline Responsible: Certified Therapeutic Recreation Specialist     Electronically signed by Raleigh Myers MA on 12/18/2020 at 10:15 AM

## 2020-12-18 NOTE — PROGRESS NOTES
Physical Therapy    Facility/Department: [facility] GERIATRIC PSYCH UNIT  Initial Assessment    NAME: Maurilio Alvarado  : 1950  MRN: [id number]    Date of Service: 2020    Discharge Recommendations:  Continue to assess pending progress, Home with Home health PT   PT Equipment Recommendations  Equipment Needed: Yes  Mobility Devices: Canes  Cane: Straight Cane    Assessment   Body structures, Functions, Activity limitations: Decreased functional mobility ; Decreased balance;Decreased safe awareness;Decreased cognition;Decreased endurance;Decreased ADL status; Decreased posture  Assessment: P is 66 yo male who presents with deficits in LE strength, balance, gait and overall decreased safety with functional mobility. Recommend skilled pT to address deficits and maximize functional potential.  Treatment Diagnosis: impaired balance, gait, strength  Prognosis: Good  Decision Making: Medium Complexity  History: see below  Exam: assessed strength, balance, gait  Clinical Presentation: evolving  PT Education: PT Role;Plan of Care;General Safety;Gait Training  Barriers to Learning: decreased safety  REQUIRES PT FOLLOW UP: Yes  Activity Tolerance  Activity Tolerance: Patient Tolerated treatment well       Patient Diagnosis(es): There were no encounter diagnoses. has no past medical history on file. has no past surgical history on file.     Restrictions  Restrictions/Precautions  Restrictions/Precautions: General Precautions, Fall Risk  Vision/Hearing  Vision: Within Functional Limits  Hearing: Within functional limits     Subjective  General  Chart Reviewed: Yes  Patient assessed for rehabilitation services?: Yes  Family / Caregiver Present: No  Follows Commands: Within Functional Limits  Subjective  Subjective: P agreeable to working with therapy  Pain Screening  Patient Currently in Pain: Other (comment)(reports stomach cramping/constipation)  Vital Signs  Patient Currently in Pain: Other (comment)(reports stomach cramping/constipation)       Orientation  Orientation  Overall Orientation Status: Within Functional Limits  Social/Functional History  Social/Functional History  Lives With: Alone  Type of Home: Apartment  Home Layout: One level  Home Access: Level entry  Entrance Stairs - Number of Steps: 10-15 steps to apartment door  Entrance Stairs - Rails: Right  Bathroom Shower/Tub: Walk-in shower  Bathroom Toilet: Standard  Bathroom Accessibility: Not accessible  ADL Assistance: Independent  Homemaking Assistance: Independent  Homemaking Responsibilities: Yes  Meal Prep Responsibility: Primary  Laundry Responsibility: Primary  Cleaning Responsibility: Primary  Bill Paying/Finance Responsibility: Primary  Shopping Responsibility: Primary  Dependent Care Responsibility: Primary  Health Care Management: Primary  Ambulation Assistance: Independent  Transfer Assistance: Independent  Active : No  Mode of Transportation: Bus  Occupation: Retired  Type of occupation: Patient states he worked various jobs in his lifetime. Leisure & Hobbies: video games  Cognition   Cognition  Overall Cognitive Status: Exceptions  Arousal/Alertness: Appropriate responses to stimuli  Following Commands:  Follows one step commands with repetition  Attention Span: Attends with cues to redirect  Memory: Appears intact  Safety Judgement: Decreased awareness of need for safety  Insights: Decreased awareness of deficits  Initiation: Requires cues for some  Sequencing: Requires cues for some    Objective     Observation/Palpation  Observation: P finishing up toileting    PROM RLE (degrees)  RLE PROM: WFL  AROM RLE (degrees)  RLE AROM: WFL  PROM LLE (degrees)  LLE PROM: WFL  AROM LLE (degrees)  LLE AROM : WFL  Strength RLE  Strength RLE: Exception  R Hip Flexion: 4+/5  R Knee Extension: 4+/5  R Ankle Dorsiflexion: 4+/5  Strength LLE  Strength LLE: Exception  L Hip Flexion: 4+/5  L Knee Extension: 4+/5  L Ankle Dorsiflexion: 4+/5  Tone RLE  RLE Tone: Normotonic  Tone LLE  LLE Tone: Normotonic  Motor Control  Gross Motor?: WFL  Sensation  Overall Sensation Status: Impaired(intermittent numbness in hands)  Bed mobility  Comment: not assessed p seen out of bed  Transfers  Sit to Stand: Stand by assistance  Stand to sit: Stand by assistance  Ambulation  Ambulation?: Yes  WB Status: full  More Ambulation?: No  Ambulation 1  Surface: level tile  Device: No Device  Assistance: Supervision  Quality of Gait: moderate sway, forward lean  Distance: 25'  Comments: P ambulates with RW x 100' x 2 bouts with CGA to SBA. P with max cues for walker safety.  P with mild imbalance with walker keeping walker too far forward and performing with short step lengths  Stairs/Curb  Stairs?: No     Balance  Posture: Good  Sitting - Static: Good  Sitting - Dynamic: Good  Standing - Static: Good  Standing - Dynamic: [address]  Times per week: 3+  Plan weeks: 2 weeks  Current Treatment Recommendations: Strengthening, Safety Education & Training, Balance Training, Endurance Training, Patient/Caregiver Education & Training, Functional Mobility Training, Equipment Evaluation, Education, & procurement, Gait Training, Transfer Training, Stair training, Positioning  Safety Devices  Type of devices: Left in chair, Nurse notified  Restraints  Initially in place: No      AM-PAC Score  AM-PAC Inpatient Mobility Raw Score : 19 (12/18/20 1438)  AM-PAC Inpatient T-Scale Score : 45.44 (12/18/20 1438)  Mobility Inpatient CMS 0-100% Score: 41.77 (12/18/20 1438)  Mobility Inpatient CMS G-Code Modifier : CK (12/18/20 1438)          Goals  Short term goals  Time Frame for Short term goals: 1 week  Short term goal 1: Pt to complete all bed mobility mod I  Short term goal 2: Pt to complete all STS transfers to/from bed, commode, and chair mod I  Short term goal 3: Pt to ambulate 150' with LRAD mod I  Short term goal 4: P will ascend/descend step x 15 reps with 1 support rail and Cyril       Therapy Time   Individual Concurrent Group Co-treatment   Time In 1336         Time Out 1353         Minutes 17         Timed Code Treatment Minutes: 0 Minutes       Maggie Ruffin PT    Electronically signed by Maggie Ruffin PT on 12/18/2020 at 2:39 PM

## 2020-12-18 NOTE — PROGRESS NOTES
15 - 37 IU/L    Alkaline Phosphatase 53 40 - 129 IU/L    GFR Non- 54 (L) >60 mL/min/1.73m2    GFR African American >60 >60 mL/min/1.73m2    Anion Gap 4 4 - 16   CBC    Collection Time: 12/17/20  9:00 AM   Result Value Ref Range    WBC 7.2 4.0 - 10.5 K/CU MM    RBC 4.83 4.6 - 6.2 M/CU MM    Hemoglobin 12.2 (L) 13.5 - 18.0 GM/DL    Hematocrit 39.3 (L) 42 - 52 %    MCV 81.4 78 - 100 FL    MCH 25.3 (L) 27 - 31 PG    MCHC 31.0 (L) 32.0 - 36.0 %    RDW 16.5 (H) 11.7 - 14.9 %    Platelets 471 616 - 312 K/CU MM    MPV 10.5 7.5 - 11.1 FL   Hemoglobin A1c    Collection Time: 12/17/20  9:00 AM   Result Value Ref Range    Hemoglobin A1C 6.5 (H) 4.2 - 6.3 %    eAG 140 mg/dL   Lipid panel - fasting    Collection Time: 12/17/20  9:00 AM   Result Value Ref Range    Triglycerides 82 <150 MG/DL    Cholesterol 119 <200 MG/DL    HDL 37 (L) >40 MG/DL    LDL Direct 64 <100 MG/DL       PSYCHIATRIC ASSESSMENT / MENTAL STATUS EXAM:   Vitals: Blood pressure 136/84, pulse 82, temperature 96.9 °F (36.1 °C), temperature source Temporal, resp. rate 16, height 5' 10\" (1.778 m), weight 150 lb (68 kg), SpO2 98 %. CONSTITUTIONAL:    Appearance: appears stated age. alert and oriented to person, place, time & situation. no acute distress. Adequate grooming and hygeine. Good eye contact. No prominent physical abnormalities. Attitude: Manner is cooperative and pleasant  Motor: No psychomotor agitation, retardation or abnormal movements noted  Speech: Clearly articulated; normal rate, volume, tone & amount. Language: intact understanding and production  Mood: ok  Affect: euthymic, full range, non-labile, congruent with mood and content of speech  Thought Production: Spontaneous. Thought Form: Coherent, linear, logical & goal-directed. No tangentiality or circumstantiality. No flight of ideas or loosening of associations.   Thought Content/Perceptions: No YOSELIN, no AVH, no delusion  Insight: questionable  Judgment- questionable  Memory: Immediate, recent, and remote appear intact, though not formally tested. Attention: maintained throughout interview  Fund of knowledge: Average  Gait/Balance: not assessed         IMPRESSION:   Major depressive disorder, recurrent episode, severe with psychosis  R/o Schizoaffective disorder             PLAN:  Psychiatric management:medication initiation and titration, group and individual therapy, safe and theraputic environment. Status of problem/condition: ?Improving  Medical co-morbidities: Management per [facility] group, appreciate assistance  Legal Status:Pending  Disposition:estimated LOS: Pending. The treatment team reviewed with the patient the diagnosis and treatment recommendations to include the risks, benefits, and side effects of chosen medications. The patient verbalized understanding and agreed with the treatment regimen as outlined above. The patient was encouraged to participate in groups. Medical records, Labs, Diagnotic tests reviewed  q15 min safety checks for safety  Interval History. Review current labs- Hospitalist notified of elevated BUN and Hemoglobin A1c. Continue current medications. Will increase abilify 10 mg po hs. Continue zoloft 50 mg. Continue trazodone 50 mg po hs  Supportive Therapy Provided  Pt had an opportunity to ask questions and address concerns  Pt encouraged to continue therapy group or individual.  Pt was in agreement with treatment plan. The risks benefits and side effects of medications were discussed with the patient, including alternatives and no treatment.     Electronically signed by DESIREE Stock CNP on 12/18/2020 at 10:24 AM

## 2020-12-18 NOTE — PROGRESS NOTES
Occupational Therapy   Occupational Therapy Initial Assessment  Date: 2020   Patient Name: Hattie Colón  MRN: [id number]     : 1950    Date of Service: 2020    Discharge Recommendations:  Home with assist PRN       Assessment   Performance deficits / Impairments: Decreased balance  Assessment: 66 yo male who walks with a slightly unsteady gait but is MI with transfers and does well with rolling walker. No acute OT needs identified  Treatment Diagnosis: Generalized weakness  Prognosis: Good  Decision Making: Low Complexity  History: see above  Exam: see above  OT Education: OT Role;Transfer Training  REQUIRES OT FOLLOW UP: No  Activity Tolerance  Activity Tolerance: Patient Tolerated treatment well           Patient Diagnosis(es): There were no encounter diagnoses. has no past medical history on file. has no past surgical history on file.     Treatment Diagnosis: Generalized weakness      Restrictions  Restrictions/Precautions  Restrictions/Precautions: General Precautions, Fall Risk    Subjective   General  Patient assessed for rehabilitation services?: Yes     Social/Functional History  Social/Functional History  Lives With: Alone  Type of Home: Apartment  Home Layout: One level  Home Access: Level entry  Entrance Stairs - Number of Steps: 10-15 steps to apartment door  Entrance Stairs - Rails: Right  Bathroom Shower/Tub: Walk-in shower  Bathroom Toilet: Standard  Bathroom Accessibility: Not accessible  Receives Help From: Family  ADL Assistance: Independent  Homemaking Assistance: Independent  Homemaking Responsibilities: Yes  Meal Prep Responsibility: Primary  Laundry Responsibility: Primary  Cleaning Responsibility: Primary  Bill Paying/Finance Responsibility: Primary  Shopping Responsibility: Primary  Dependent Care Responsibility: Primary  Health Care Management: Primary  Ambulation Assistance: Independent  Transfer Assistance: Independent  Active : No  Mode of Transportation: Bus  Occupation: Retired  Type of occupation: Patient states he worked various jobs in his lifetime. Leisure & Hobbies: video games  Additional Comments: Pt may be a poor historian. Recommend follow-up. Objective   Vision: Within Functional Limits  Hearing: Within functional limits    Orientation  Overall Orientation Status: Within Functional Limits  Observation/Palpation  Observation: Pt was in his room sitting on EOB  Balance  Sitting Balance: Independent  Standing Balance: Supervision  Functional Mobility  Functional - Mobility Device: Rolling Walker(and no device)  Assist Level: Modified independent   Toilet Transfers  Toilet Transfer: Modified independent  ADL  Feeding: Independent  Grooming: Independent  UE Bathing: Independent(simulated)  UE Dressing: Independent  LE Dressing: (pulled pants up and down for toileting, donned socks independently)  Toileting: Independent  Tone RUE  RUE Tone: Normotonic  Tone LUE  LUE Tone: Normotonic  Quality of Movement Other  Comment: some shakiness        Transfers  Sit to stand: Modified independent  Stand to sit: Modified independent     Cognition  Overall Cognitive Status: Exceptions  Arousal/Alertness: Appropriate responses to stimuli  Following Commands:  Follows one step commands with repetition  Attention Span: Attends with cues to redirect  Memory: Appears intact  Safety Judgement: Decreased awareness of need for safety  Insights: Decreased awareness of deficits  Initiation: Requires cues for some  Sequencing: Requires cues for some        Sensation  Overall Sensation Status: Impaired  Additional Comments: some numbness in hands        LUE AROM (degrees)  LUE AROM : WFL  RUE AROM (degrees)  RUE AROM : WFL  LUE Strength  Gross LUE Strength: WFL  RUE Strength  Gross RUE Strength: WFL                   Plan   Plan  Times per week: no acute OT needs identified    G-Code     OutComes Score                                                  AM-PAC Score

## 2020-12-18 NOTE — PROGRESS NOTES
Pt seen by therapist from approximately [id number] to complete pt's recreation/leisure assessment. Pt states reason for admission is d/t  \"Medical Illness. \"  Pt denies need for mental health treatment. Pt reports living a sedentary lifestyle and isolates to his home. Pt denies need to change these behaviors. Therapist encouraged pt to attend groups.      Electronically signed by Elida Kamara MA on 12/18/2020 at 2:32 PM

## 2020-12-18 NOTE — BH NOTE
Radha Hoyt was visible on the milieu. He spent time watching TV and talking with staff. He was polite and friendly on approach. His affect was brighter and relaxed. He stated that he had talked with his brother whom he thought had andrew murdered, and his sister-in-law. He found out that his family is safe. He thought he had lost his two wallets and his brother informed him that he has both of his wallets with all of the money in the wallet. Radha Hoyt was feeling very relieved to know that his family is alive and his two wallets are safe. He denied SI/HI, depression, anxiety and hallucinations. He stated that he has not 'seen' anything all day. He is comfortable on the unit but hopes that he is able to go home soon. Medication was reviewed and verbal education given on Eliquis which was ordered for him today. He voiced understanding of the purpose and possible side effects. Will continue to monitor for safety.

## 2020-12-18 NOTE — CARE COORDINATION
Conversing with patient. Patient voices relief that his brother was not murdered as he had a vivid dream that he had been. Patient was so concerned that this truly happened, that he had to call his brother to verify this.  Patient states now, that he realizes that was not real.

## 2020-12-18 NOTE — GROUP NOTE
Group Therapy Note    Date: 12/18/2020    Group Start Time: 4381  Group End Time: 1600    Number of Participants: 0/2    Type: Exercise/Recreation Group    Group Topic/Objective: Chair Exercises    Notes:  Pt sleeping and per discussion with nurse allowed to continue to rest.     Discipline Responsible: Certified Therapeutic Recreation Specialist     Electronically signed by Dandy Mann MA on 12/18/2020 at 4:04 PM

## 2020-12-19 PROCEDURE — 6370000000 HC RX 637 (ALT 250 FOR IP): Performed by: INTERNAL MEDICINE

## 2020-12-19 PROCEDURE — 99232 SBSQ HOSP IP/OBS MODERATE 35: CPT | Performed by: NURSE PRACTITIONER

## 2020-12-19 PROCEDURE — 6370000000 HC RX 637 (ALT 250 FOR IP): Performed by: NURSE PRACTITIONER

## 2020-12-19 PROCEDURE — 6370000000 HC RX 637 (ALT 250 FOR IP): Performed by: PSYCHIATRY & NEUROLOGY

## 2020-12-19 PROCEDURE — 1240000000 HC EMOTIONAL WELLNESS R&B

## 2020-12-19 RX ADMIN — ALOGLIPTIN 12.5 MG: 12.5 TABLET, FILM COATED ORAL at 08:17

## 2020-12-19 RX ADMIN — APIXABAN 5 MG: 5 TABLET, FILM COATED ORAL at 08:17

## 2020-12-19 RX ADMIN — TRAZODONE HYDROCHLORIDE 50 MG: 50 TABLET ORAL at 20:41

## 2020-12-19 RX ADMIN — METOPROLOL TARTRATE 50 MG: 50 TABLET, FILM COATED ORAL at 08:18

## 2020-12-19 RX ADMIN — AMLODIPINE BESYLATE 10 MG: 10 TABLET ORAL at 08:18

## 2020-12-19 RX ADMIN — SERTRALINE HYDROCHLORIDE 50 MG: 50 TABLET ORAL at 08:18

## 2020-12-19 RX ADMIN — APIXABAN 5 MG: 5 TABLET, FILM COATED ORAL at 20:41

## 2020-12-19 RX ADMIN — ASPIRIN 81 MG: 81 TABLET, COATED ORAL at 08:18

## 2020-12-19 RX ADMIN — METOPROLOL TARTRATE 50 MG: 50 TABLET, FILM COATED ORAL at 23:20

## 2020-12-19 RX ADMIN — LOSARTAN POTASSIUM 25 MG: 25 TABLET ORAL at 08:18

## 2020-12-19 RX ADMIN — ARIPIPRAZOLE 10 MG: 10 TABLET ORAL at 20:41

## 2020-12-19 ASSESSMENT — PAIN SCALES - GENERAL
PAINLEVEL_OUTOF10: 0
PAINLEVEL_OUTOF10: 0

## 2020-12-19 NOTE — PROGRESS NOTES
Psychiatric Progress Note    Hattie Colón  [id number]  12/19/20    CC: \"I was having headaches, losing my balance, my legs got weak. \"     HPI: Patient noted a fall on 12/11/2020 and came to [facility]. He denied any symptoms of chest pain, SOB prior to this. Patient is poor historian, kept saying though interview  \"I am going to die, what is the point of all these? \" kept saying death is near, would not say why he felt so. He denies any suicidal ideation or homicidal ideation. Patient notes there has been very generalized weakness and fatigue in the past couple of days, denies an fever or chills, no chest pain, palpitations , dizziness, cough or shortness of breath, no nausea, vomiting or urinary symptoms. Patient admitted to [facility]. He is voluntary. Pa  Pt remains in agreement with treatment plan. Pt Continues to deny and side-effects to current medications. Pt was polite and cordal during the interview process. Has been taking medications and has been compliant with treatment. Tolerating medications without side effect complaints. He has been  x 2 and  x 2. Pt noted he is doing \"pretty good today. \"  Pt noted he feels safe and comfortable on the unit. Pt was polite and cordial during the interview process. Currently he denies SI/HI. He is reporting Auditory hallucinations; he reports hearing soft voices in the background that sometimes say his name. He denies command voices. He reports visual hallucination stating \"I see shadows. \" He is also reporting a history of delusions with Sherrubina Light"  And then reported a dream where he thought his brother and sister were killed. After calling them he was consoled. Denies S/S of bipolar disorder. Again does report hx of delusions and auditory and visual hallucinations in the past.  States He rates his depression as \"2\" on a scale of 0 to 10 with 0 being none and 10 being horrible.  He rates his anxiety as \"0\" on the same scale. He states he was able to sleep last night; per staff he slept 8.5 hours. He is oriented x 3. Hemoglobin A1C is 6.5. BuN elevated. Hospitalist started 12.5 daily alogliptin for elevated hemoglobin A1C. Attended treatment team and face to face with patient 25 minutes    No Known Allergies    Medications Prior to Admission: traZODone (DESYREL) 50 MG tablet, Take 1 tablet by mouth nightly  losartan (COZAAR) 25 MG tablet, Take 1 tablet by mouth daily  metoprolol tartrate (LOPRESSOR) 50 MG tablet, Take 1 tablet by mouth 2 times daily  amLODIPine (NORVASC) 10 MG tablet, Take 1 tablet by mouth daily  aspirin EC 81 MG EC tablet, Take 1 tablet by mouth daily    History reviewed. No pertinent past medical history. Patient Active Problem List   Diagnosis    Generalized weakness    [facility] depressive disorder ([facility]    [facility]    Major depressive disorder, recurrent episode, severe, with psychosis ([facility]       Review of Systems    OBJECTIVE  Vital Signs:  Vitals:    12/19/20 0845   BP: (!) 141/87   Pulse: 66   Resp: 16   Temp: 97.6 °F (36.4 °C)   SpO2: 97%       Labs:  No results found for this or any previous visit (from the past 48 hour(s)). PSYCHIATRIC ASSESSMENT / MENTAL STATUS EXAM:   Vitals: Blood pressure (!) 141/87, pulse 66, temperature 97.6 °F (36.4 °C), temperature source Temporal, resp. rate 16, height 5' 10\" (1.778 m), weight 150 lb (68 kg), SpO2 97 %. CONSTITUTIONAL:    Appearance: appears stated age. alert and oriented to person, place, time & situation. no acute distress. Adequate grooming and hygeine. Good eye contact. No prominent physical abnormalities. Attitude: Manner is cooperative and pleasant  Motor: No psychomotor agitation, retardation or abnormal movements noted  Speech: Clearly articulated; normal rate, volume, tone & amount.   Language: intact understanding and production  Mood: ok  Affect: euthymic, full range, non-labile, congruent with mood and content of speech  Thought Production: Spontaneous. Thought Form: Coherent, linear, logical & goal-directed. No tangentiality or circumstantiality. No flight of ideas or loosening of associations. Thought Content/Perceptions: No YOSELIN, no AVH, no delusion  Insight: questionable  Judgment- questionable  Memory: Immediate, recent, and remote appear intact, though not formally tested. Attention: maintained throughout interview  Fund of knowledge: Average  Gait/Balance: not assessed         IMPRESSION:   Major depressive disorder, recurrent episode, severe with psychosis  R/o Schizoaffective disorder             PLAN:  Psychiatric management:medication initiation and titration, group and individual therapy, safe and theraputic environment. Status of problem/condition: ?Improving  Medical co-morbidities: [facility] appreciate assistance  Legal Status:Pending  Disposition:estimated LOS: Pending. The treatment team reviewed with the patient the diagnosis and treatment recommendations to include the risks, benefits, and side effects of chosen medications. The patient verbalized understanding and agreed with the treatment regimen as outlined above. The patient was encouraged to participate in groups. Medical records, Labs, Diagnotic tests reviewed  q15 min safety checks for safety  Interval History. Review current labs- Hospitalist notified of elevated BUN and Hemoglobin A1c. Continue abilify 10 mg po hs,  zoloft 50 mg daily, and trazodone 50 mg po hs. Alogliptin 12.5 daily started by hospitalist for elevated HgA1c. Supportive Therapy Provided  Pt had an opportunity to ask questions and address concerns  Pt encouraged to continue therapy group or individual.  Pt was in agreement with treatment plan. The risks benefits and side effects of medications were discussed with the patient, including alternatives and no treatment.     Electronically signed by Trever Pearl

## 2020-12-19 NOTE — PLAN OF CARE
Problem: Falls - Risk of:  Goal: Will remain free from falls  Description: Will remain free from falls  12/19/2020 0909 by Renella Meckel, RN  Outcome: Ongoing  12/18/2020 2218 by Felipa Miller RN  Outcome: Met This Shift  Goal: Absence of physical injury  Description: Absence of physical injury  12/19/2020 0909 by Renella Meckel, RN  Outcome: Ongoing  12/18/2020 2218 by Felipa Miller RN  Outcome: Met This Shift     Problem: Anger Management/Homicidal Ideation:  Goal: Able to display appropriate communication and problem solving  Description: Able to display appropriate communication and problem solving  12/19/2020 0909 by Renella Meckel, RN  Outcome: Ongoing  12/18/2020 2218 by Felipa Mliler RN  Outcome: Ongoing  Goal: Ability to verbalize frustrations and anger appropriately will improve  Description: Ability to verbalize frustrations and anger appropriately will improve  12/19/2020 0909 by Renella Meckel, RN  Outcome: Ongoing  12/18/2020 2218 by Felipa Miller RN  Outcome: Ongoing  Goal: Absence of angry outbursts  Description: Absence of angry outbursts  12/19/2020 0909 by Renella Meckel, RN  Outcome: Ongoing  12/18/2020 2218 by Felipa Miller RN  Outcome: Met This Shift  Goal: Absence of homicidal ideation  Description: Absence of homicidal ideation  12/19/2020 0909 by Renella Meckel, RN  Outcome: Ongoing  12/18/2020 2218 by Felipa Miller RN  Outcome: Met This Shift  Goal: Participates in care planning  Description: Participates in care planning  12/19/2020 0909 by Renella Meckel, RN  Outcome: Ongoing  12/18/2020 2218 by Felipa Miller RN  Outcome: Ongoing  Goal: Patient specific goal  Description: Patient specific goal  12/19/2020 0909 by Renella Meckel, RN  Outcome: Ongoing  12/18/2020 2218 by Felipa Miller RN  Outcome: Ongoing     Problem: Altered Mood, Depressive Behavior:  Goal: Able to verbalize acceptance of life and situations over which he or she has no decrease in depressive symptoms  Description: Able to verbalize and/or display a decrease in depressive symptoms  12/19/2020 0909 by Elizabeth Shah RN  Outcome: Ongoing  12/18/2020 2218 by Brando Kennedy RN  Outcome: Ongoing  Goal: Ability to disclose and discuss suicidal ideas will improve  Description: Ability to disclose and discuss suicidal ideas will improve  12/19/2020 0909 by Elizabeth Shah RN  Outcome: Ongoing  12/18/2020 2218 by Brando Kennedy RN  Outcome: Ongoing  Goal: Able to verbalize support systems  Description: Able to verbalize support systems  12/19/2020 0909 by Elizabeth Shah RN  Outcome: Ongoing  12/18/2020 2218 by Brando Kennedy RN  Outcome: Met This Shift  Goal: Absence of self-harm  Description: Absence of self-harm  12/19/2020 0909 by Elizabeth Shah RN  Outcome: Ongoing  12/18/2020 2218 by Brando Kennedy RN  Outcome: Met This Shift  Goal: Patient specific goal  Description: Patient specific goal  12/19/2020 0909 by Elizabeth Shah RN  Outcome: Ongoing  12/18/2020 2218 by Brando Kennedy RN  Outcome: Ongoing  Goal: Participates in care planning  Description: Participates in care planning  12/19/2020 0909 by Elizabeth Shah RN  Outcome: Ongoing  12/18/2020 2218 by Brando Kennedy RN  Outcome: Ongoing     Problem: Altered Mood, Deterioration in Function:  Goal: Ability to perform activities of daily living will improve  Description: Ability to perform activities of daily living will improve  12/19/2020 0909 by Elizabeth Shah RN  Outcome: Ongoing  12/18/2020 2218 by Brando Kennedy RN  Outcome: Ongoing  Goal: Able to verbalize reality based thinking  Description: Able to verbalize reality based thinking  12/19/2020 0909 by Elizabeth Shah RN  Outcome: Ongoing  12/18/2020 2218 by Brando Kennedy RN  Outcome: Ongoing  Goal: Skin appearance normal  Description: Skin appearance normal  12/19/2020 0909 by Elizabeth Shah RN  Outcome: Ongoing  12/18/2020 2218 by Charlene Blackburn Blaine Abraham RN  Outcome: Met This Shift  Goal: Maintenance of adequate nutrition will improve  Description: Maintenance of adequate nutrition will improve  12/19/2020 0909 by Susana Gross RN  Outcome: Ongoing  12/18/2020 2218 by Kari Hui RN  Outcome: Met This Shift  Goal: Ability to tolerate increased activity will improve  Description: Ability to tolerate increased activity will improve  12/19/2020 0909 by Susana Gross RN  Outcome: Ongoing  12/18/2020 2218 by Kari Hui RN  Outcome: Ongoing  Goal: Participates in care planning  Description: Participates in care planning  12/19/2020 0909 by Susana Gross RN  Outcome: Ongoing  12/18/2020 2218 by Kari Hui RN  Outcome: Ongoing  Goal: Patient specific goal  Description: Patient specific goal  12/19/2020 0909 by Susana Gross RN  Outcome: Ongoing  12/18/2020 2218 by Kari Hui RN  Outcome: Ongoing     Problem: Risk of Harm:  Goal: Ability to remain free from injury will improve  Description: Ability to remain free from injury will improve  12/19/2020 0909 by Susana Gross RN  Outcome: Ongoing  12/18/2020 2218 by Kari Hui RN  Outcome: Met This Shift     Problem: Altered Mood, Manic Behavior:  Goal: Able to sleep  Description: Able to sleep  12/19/2020 0909 by Susana Gross RN  Outcome: Ongoing  12/18/2020 2218 by Kari Hui RN  Outcome: Met This Shift  Goal: Able to verbalize decrease in frequency and intensity of racing thoughts  Description: Able to verbalize decrease in frequency and intensity of racing thoughts  12/19/2020 0909 by Susana Gross RN  Outcome: Ongoing  12/18/2020 2218 by Kari Hui RN  Outcome: Met This Shift  Goal: Ability to disclose and discuss suicidal ideas will improve  Description: Ability to disclose and discuss suicidal ideas will improve  12/19/2020 0909 by Susana Gross RN  Outcome: Ongoing  12/18/2020 2218 by Kari Hui RN  Outcome: Met This Shift  Goal: Absence of self-harm  Description: Absence of self-harm  12/19/2020 0909 by Marielena Mart RN  Outcome: Ongoing  12/18/2020 2218 by Alec Andrews RN  Outcome: Met This Shift  Goal: Ability to achieve adequate nutritional intake will improve  Description: Ability to achieve adequate nutritional intake will improve  12/19/2020 0909 by Marielena Mart RN  Outcome: Ongoing  12/18/2020 2218 by Alec Andrews RN  Outcome: Ongoing  Goal: Ability to interact with others will improve  Description: Ability to interact with others will improve  12/19/2020 0909 by Marielena Mart RN  Outcome: Ongoing  12/18/2020 2218 by Alec Andrews RN  Outcome: Met This Shift  Goal: Ability to demonstrate self-control will improve  Description: Ability to demonstrate self-control will improve  12/19/2020 0909 by Marielena Mart RN  Outcome: Ongoing  12/18/2020 2218 by Alec Andrews RN  Outcome: Ongoing  Goal: Mood stable  Description: Mood stable  12/19/2020 0909 by Marielena Mart RN  Outcome: Ongoing  12/18/2020 2218 by Alec Andrews RN  Outcome: Met This Shift  Goal: Patient specific goal  Description: Patient specific goal  12/19/2020 0909 by Marielena Mart RN  Outcome: Ongoing  12/18/2020 2218 by Alec Andrews RN  Outcome: Ongoing     Problem: Altered Mood, Psychotic Behavior:  Goal: Able to demonstrate trust by eating, participating in treatment and following staff's direction  Description: Able to demonstrate trust by eating, participating in treatment and following staff's direction  12/19/2020 0909 by Marielena Mart RN  Outcome: Ongoing  12/18/2020 2218 by Alec Andrews RN  Outcome: Ongoing  Goal: Able to verbalize decrease in frequency and intensity of hallucinations  Description: Able to verbalize decrease in frequency and intensity of hallucinations  12/19/2020 0909 by Marielena Mart RN  Outcome: Ongoing  12/18/2020 2218 by Alec Andrews RN  Outcome: Met This Shift  Goal: Able to verbalize reality based thinking  Description: Able to verbalize reality based thinking  12/19/2020 0909 by Brianna Dietz RN  Outcome: Ongoing  12/18/2020 2218 by Jolanta Ewing RN  Outcome: Ongoing  Goal: Absence of self-harm  Description: Absence of self-harm  12/19/2020 0909 by Brianna Dietz RN  Outcome: Ongoing  12/18/2020 2218 by Jolanta Ewing RN  Outcome: Met This Shift  Goal: Ability to achieve adequate nutritional intake will improve  Description: Ability to achieve adequate nutritional intake will improve  12/19/2020 0909 by Brianna Dietz RN  Outcome: Ongoing  12/18/2020 2218 by Jolanta Ewing RN  Outcome: Met This Shift  Goal: Ability to interact with others will improve  Description: Ability to interact with others will improve  12/19/2020 0909 by Brianna Dietz RN  Outcome: Ongoing  12/18/2020 2218 by Jolanta Ewing RN  Outcome: Met This Shift  Goal: Compliance with prescribed medication regimen will improve  Description: Compliance with prescribed medication regimen will improve  12/19/2020 0909 by Brianna Dietz RN  Outcome: Ongoing  12/18/2020 2218 by Jolanta Ewing RN  Outcome: Met This Shift  Goal: Patient specific goal  Description: Patient specific goal  12/19/2020 0909 by Brianna Dietz RN  Outcome: Ongoing  12/18/2020 2218 by Jolanta Ewing RN  Outcome: Ongoing     Problem: Substance Abuse:  Goal: Absence of drug withdrawal signs and symptoms  Description: Absence of drug withdrawal signs and symptoms  12/19/2020 0909 by Brianna Dietz RN  Outcome: Ongoing  12/18/2020 2218 by Jolanta Ewing RN  Outcome: Ongoing  Goal: Participates in care planning  Description: Participates in care planning  12/19/2020 0909 by Brianna Dietz RN  Outcome: Ongoing  12/18/2020 2218 by Jolanta Ewing RN  Outcome: Ongoing  Goal: Patient specific goal  Description: Patient specific goal  12/19/2020 0909 by Brianna Dietz RN  Outcome: Ongoing  12/18/2020 2218 by Jolanta Ewing RN  Outcome: Ongoing

## 2020-12-19 NOTE — BH NOTE
Cam  was visible on the milieu. Polite on approach. Cooperative with vitals. Denied depression, SI/HI, anxiety and hallucinations. Medication reviewed he voiced understanding and was medication compliant with medication whole. He stated that he hopes to go home before [place]. Will continue to monitor for safety.

## 2020-12-19 NOTE — PLAN OF CARE
Problem: Falls - Risk of:  Goal: Will remain free from falls  Description: Will remain free from falls  12/18/2020 2218 by Ruth Hendrickson RN  Outcome: Met This Shift  12/18/2020 0842 by Vernon Combs RN  Outcome: Ongoing  Goal: Absence of physical injury  Description: Absence of physical injury  12/18/2020 2218 by Ruth Hendrickson RN  Outcome: Met This Shift  12/18/2020 0842 by Vernon Combs RN  Outcome: Ongoing

## 2020-12-19 NOTE — PROGRESS NOTES
Pt denies anxiety and depression. He does admit to auditory hallucinations of voices that are very soft and difficult to understand. He denies SI and HI.      1430 Pt has been out in common area most of the day. Pleasant and conversing with staff with patients. Pt cooperative with medications. Resting now.

## 2020-12-20 PROCEDURE — 6370000000 HC RX 637 (ALT 250 FOR IP): Performed by: PSYCHIATRY & NEUROLOGY

## 2020-12-20 PROCEDURE — 99231 SBSQ HOSP IP/OBS SF/LOW 25: CPT | Performed by: NURSE PRACTITIONER

## 2020-12-20 PROCEDURE — 6370000000 HC RX 637 (ALT 250 FOR IP): Performed by: INTERNAL MEDICINE

## 2020-12-20 PROCEDURE — 1240000000 HC EMOTIONAL WELLNESS R&B

## 2020-12-20 PROCEDURE — 6370000000 HC RX 637 (ALT 250 FOR IP): Performed by: NURSE PRACTITIONER

## 2020-12-20 RX ADMIN — ALOGLIPTIN 12.5 MG: 12.5 TABLET, FILM COATED ORAL at 08:03

## 2020-12-20 RX ADMIN — ARIPIPRAZOLE 15 MG: 10 TABLET ORAL at 19:53

## 2020-12-20 RX ADMIN — SERTRALINE HYDROCHLORIDE 50 MG: 50 TABLET ORAL at 08:03

## 2020-12-20 RX ADMIN — APIXABAN 5 MG: 5 TABLET, FILM COATED ORAL at 19:53

## 2020-12-20 RX ADMIN — ASPIRIN 81 MG: 81 TABLET, COATED ORAL at 08:03

## 2020-12-20 RX ADMIN — METOPROLOL TARTRATE 50 MG: 50 TABLET, FILM COATED ORAL at 08:03

## 2020-12-20 RX ADMIN — APIXABAN 5 MG: 5 TABLET, FILM COATED ORAL at 08:03

## 2020-12-20 RX ADMIN — LOSARTAN POTASSIUM 25 MG: 25 TABLET ORAL at 08:03

## 2020-12-20 RX ADMIN — METOPROLOL TARTRATE 50 MG: 50 TABLET, FILM COATED ORAL at 19:54

## 2020-12-20 RX ADMIN — TRAZODONE HYDROCHLORIDE 50 MG: 50 TABLET ORAL at 19:53

## 2020-12-20 RX ADMIN — AMLODIPINE BESYLATE 10 MG: 10 TABLET ORAL at 08:03

## 2020-12-20 ASSESSMENT — PAIN SCALES - GENERAL: PAINLEVEL_OUTOF10: 0

## 2020-12-20 NOTE — PROGRESS NOTES
Assessment completed. Pt is alert and oriented to person, place, time and situation. Pt reports sleeping well sleep. Pt rates depression a 0 out of 10 with ten being the worst and anxiety a 2 out of ten on the same scale. Pt has auditory or visual hallucinations. He says he hears voices that are conversation but not enough to make out what they say. Pt denies delusions. Pt denies SI. Pt denies HI.        1600 Pt noted to be incontinent of urine throughout the day and night, which he states is new for him. This could be due to medication changes while at [facility] or here on [facility]. RN has been encouraging patient to go to the bathroom every two hours and check depend for incontinence. This has somewhat improved the incontinence. 1730 Pt active on unit today. Talked to his brother a couple of times and enjoyed this. He has been med compliant.

## 2020-12-20 NOTE — PLAN OF CARE
Problem: Falls - Risk of:  Goal: Will remain free from falls  Description: Will remain free from falls  12/20/2020 0842 by Renetta Leon RN  Outcome: Ongoing  12/19/2020 2148 by Natalie Hearn RN  Outcome: Met This Shift  Goal: Absence of physical injury  Description: Absence of physical injury  12/20/2020 0842 by Renetta Leon RN  Outcome: Ongoing  12/19/2020 2148 by Natalie Hearn RN  Outcome: Met This Shift     Problem: Anger Management/Homicidal Ideation:  Goal: Able to display appropriate communication and problem solving  Description: Able to display appropriate communication and problem solving  12/20/2020 0842 by Renetta Leon RN  Outcome: Ongoing  12/19/2020 2148 by Natalie Hearn RN  Outcome: Met This Shift  Goal: Ability to verbalize frustrations and anger appropriately will improve  Description: Ability to verbalize frustrations and anger appropriately will improve  12/20/2020 0842 by Renetta Leon RN  Outcome: Ongoing  12/19/2020 2148 by Natalie Hearn RN  Outcome: Met This Shift  Goal: Absence of angry outbursts  Description: Absence of angry outbursts  12/20/2020 0842 by Renetta Leon RN  Outcome: Ongoing  12/19/2020 2148 by Natalie Hearn RN  Outcome: Met This Shift  Goal: Absence of homicidal ideation  Description: Absence of homicidal ideation  12/20/2020 0842 by Renetta Leon RN  Outcome: Ongoing  12/19/2020 2148 by Natalie Hearn RN  Outcome: Met This Shift  Goal: Participates in care planning  Description: Participates in care planning  12/20/2020 0842 by Renetta Leon RN  Outcome: Ongoing  12/19/2020 2148 by Natalie Hearn RN  Outcome: Ongoing  Goal: Patient specific goal  Description: Patient specific goal  12/20/2020 3112 by Renetta Leon RN  Outcome: Ongoing  12/19/2020 2148 by Natalie Hearn RN  Outcome: Ongoing     Problem: Altered Mood, Depressive Behavior:  Goal: Able to verbalize acceptance of life and situations over which he or she has no control  Description: Able to verbalize acceptance of life and situations over which he or she has no control  12/20/2020 0842 by Renella Meckel, RN  Outcome: Ongoing  12/19/2020 2148 by Felipa Miller RN  Outcome: Met This Shift  Goal: Able to verbalize and/or display a decrease in depressive symptoms  Description: Able to verbalize and/or display a decrease in depressive symptoms  12/20/2020 0842 by Renella Meckel, RN  Outcome: Ongoing  12/19/2020 2148 by Felipa Miller RN  Outcome: Met This Shift  Goal: Ability to disclose and discuss suicidal ideas will improve  Description: Ability to disclose and discuss suicidal ideas will improve  12/20/2020 0842 by Renella Meckel, RN  Outcome: Ongoing  12/19/2020 2148 by Felipa Miller RN  Outcome: Met This Shift  Goal: Able to verbalize support systems  Description: Able to verbalize support systems  12/20/2020 0842 by Renella Meckel, RN  Outcome: Ongoing  12/19/2020 2148 by Felipa Miller RN  Outcome: Met This Shift  Goal: Absence of self-harm  Description: Absence of self-harm  12/20/2020 0842 by Renella Meckel, RN  Outcome: Ongoing  12/19/2020 2148 by Felipa Miller RN  Outcome: Met This Shift  Goal: Patient specific goal  Description: Patient specific goal  12/20/2020 7880 by Renella Meckel, RN  Outcome: Ongoing  12/19/2020 2148 by Felipa Miller RN  Outcome: Ongoing  Goal: Participates in care planning  Description: Participates in care planning  12/20/2020 0842 by Renella Meckel, RN  Outcome: Ongoing  12/19/2020 2148 by Felipa Miller RN  Outcome: Ongoing     Problem: Depressive Behavior With or Without Suicide Precautions:  Goal: Able to verbalize acceptance of life and situations over which he or she has no control  Description: Able to verbalize acceptance of life and situations over which he or she has no control  12/20/2020 0842 by Renella Meckel, RN  Outcome: Ongoing  12/19/2020 2148 by Felipa Miller RN  Outcome: Met This Shift  Goal: Diana Kwon RN  Outcome: Ongoing  12/19/2020 2148 by Gaudencio Yao RN  Outcome: Met This Shift  Goal: Maintenance of adequate nutrition will improve  Description: Maintenance of adequate nutrition will improve  12/20/2020 0842 by Nafisa Salcido RN  Outcome: Ongoing  12/19/2020 2148 by Gaudencio Yao RN  Outcome: Met This Shift  Goal: Ability to tolerate increased activity will improve  Description: Ability to tolerate increased activity will improve  12/20/2020 0842 by Nafisa Salcido RN  Outcome: Ongoing  12/19/2020 2148 by Gaudencio Yao RN  Outcome: Met This Shift  Goal: Participates in care planning  Description: Participates in care planning  12/20/2020 0842 by Nafisa Salcido RN  Outcome: Ongoing  12/19/2020 2148 by Gaudencio Yao RN  Outcome: Ongoing  Goal: Patient specific goal  Description: Patient specific goal  12/20/2020 0842 by Nafisa Salcido RN  Outcome: Ongoing  12/19/2020 2148 by Gaudencio Yao RN  Outcome: Ongoing     Problem: Risk of Harm:  Goal: Ability to remain free from injury will improve  Description: Ability to remain free from injury will improve  12/20/2020 0842 by Nafisa Salcido RN  Outcome: Ongoing  12/19/2020 2148 by Gaudencio Yao RN  Outcome: Met This Shift     Problem: Altered Mood, Manic Behavior:  Goal: Able to sleep  Description: Able to sleep  12/20/2020 0842 by Nafisa Salcido RN  Outcome: Ongoing  12/19/2020 2148 by Gaudencio Yao RN  Outcome: Met This Shift  Goal: Able to verbalize decrease in frequency and intensity of racing thoughts  Description: Able to verbalize decrease in frequency and intensity of racing thoughts  12/20/2020 0842 by Nafisa Salcido RN  Outcome: Ongoing  12/19/2020 2148 by Gaudencio Yao RN  Outcome: Met This Shift  Goal: Ability to disclose and discuss suicidal ideas will improve  Description: Ability to disclose and discuss suicidal ideas will improve  12/20/2020 0842 by Nafisa Salcido RN  Outcome: Ongoing  12/19/2020 2148 by Basilia Stoll RN  Outcome: Met This Shift  Goal: Absence of self-harm  Description: Absence of self-harm  12/20/2020 0842 by Melissa Gutierrez RN  Outcome: Ongoing  12/19/2020 2148 by Basilia Stoll RN  Outcome: Met This Shift  Goal: Ability to achieve adequate nutritional intake will improve  Description: Ability to achieve adequate nutritional intake will improve  12/20/2020 0842 by Melissa Gutierrez RN  Outcome: Ongoing  12/19/2020 2148 by Basilia Stoll RN  Outcome: Met This Shift  Goal: Ability to interact with others will improve  Description: Ability to interact with others will improve  12/20/2020 0842 by Melissa Gutierrez RN  Outcome: Ongoing  12/19/2020 2148 by Basilia Stoll RN  Outcome: Met This Shift  Goal: Ability to demonstrate self-control will improve  Description: Ability to demonstrate self-control will improve  12/20/2020 0842 by Melissa Gutierrez RN  Outcome: Ongoing  12/19/2020 2148 by Basilia Stoll RN  Outcome: Met This Shift  Goal: Mood stable  Description: Mood stable  12/20/2020 0842 by Melissa Gutierrez RN  Outcome: Ongoing  12/19/2020 2148 by Basilia Stoll RN  Outcome: Met This Shift  Goal: Patient specific goal  Description: Patient specific goal  12/20/2020 1330 by Melissa Gutierrez RN  Outcome: Ongoing  12/19/2020 2148 by Basilia Stoll RN  Outcome: Ongoing     Problem: Altered Mood, Psychotic Behavior:  Goal: Able to demonstrate trust by eating, participating in treatment and following staff's direction  Description: Able to demonstrate trust by eating, participating in treatment and following staff's direction  12/20/2020 0842 by Melissa Gutierrez RN  Outcome: Ongoing  12/19/2020 2148 by Basilia Stoll RN  Outcome: Met This Shift  Goal: Able to verbalize decrease in frequency and intensity of hallucinations  Description: Able to verbalize decrease in frequency and intensity of hallucinations  12/20/2020 0842 by Melissa Gutierrez RN  Outcome: Ongoing  12/19/2020 2148 by Reny Bettencourt RN  Outcome: Met This Shift  Goal: Able to verbalize reality based thinking  Description: Able to verbalize reality based thinking  12/20/2020 0842 by Charlene Onofre RN  Outcome: Ongoing  12/19/2020 2148 by Reny Bettencourt RN  Outcome: Met This Shift  Goal: Absence of self-harm  Description: Absence of self-harm  12/20/2020 0842 by Charlene Onofre RN  Outcome: Ongoing  12/19/2020 2148 by Reny Bettencourt RN  Outcome: Met This Shift  Goal: Ability to achieve adequate nutritional intake will improve  Description: Ability to achieve adequate nutritional intake will improve  12/20/2020 0842 by Charlene Onofre RN  Outcome: Ongoing  12/19/2020 2148 by Reny Bettencourt RN  Outcome: Met This Shift  Goal: Ability to interact with others will improve  Description: Ability to interact with others will improve  12/20/2020 0842 by Charlene Onofre RN  Outcome: Ongoing  12/19/2020 2148 by Reny Bettencourt RN  Outcome: Met This Shift  Goal: Compliance with prescribed medication regimen will improve  Description: Compliance with prescribed medication regimen will improve  12/20/2020 0842 by Charlene Onofre RN  Outcome: Ongoing  12/19/2020 2148 by Reny Bettencourt RN  Outcome: Met This Shift  Goal: Patient specific goal  Description: Patient specific goal  12/20/2020 2637 by Charlene Onofre RN  Outcome: Ongoing  12/19/2020 2148 by Reny Bettencourt RN  Outcome: Ongoing     Problem: Substance Abuse:  Goal: Absence of drug withdrawal signs and symptoms  Description: Absence of drug withdrawal signs and symptoms  12/20/2020 0842 by Charlene Onofre RN  Outcome: Ongoing  12/19/2020 2148 by Reny Bettencourt RN  Outcome: Met This Shift  Goal: Participates in care planning  Description: Participates in care planning  12/20/2020 0842 by Charlene Onofre RN  Outcome: Ongoing  12/19/2020 2148 by Reny Bettencourt RN  Outcome: Ongoing  Goal: Patient specific goal  Description: Patient specific goal  12/20/2020 8106 by Amena Samano RN  Outcome: Ongoing  12/19/2020 2148 by Jaspal Liu RN  Outcome: Ongoing

## 2020-12-20 NOTE — PROGRESS NOTES
Psychiatric Progress Note    Hattie Colón  [id number]  12/20/20    CC: \"I was having headaches, losing my balance, my legs got weak. \"     HPI: Patient noted a fall on 12/11/2020 and came to [facility]. He denied any symptoms of chest pain, SOB prior to this. Patient is poor historian, kept saying though interview  \"I am going to die, what is the point of all these? \" kept saying death is near, would not say why he felt so. He denies any suicidal ideation or homicidal ideation. Patient notes there has been very generalized weakness and fatigue in the past couple of days, denies an fever or chills, no chest pain, palpitations , dizziness, cough or shortness of breath, no nausea, vomiting or urinary symptoms. Patient admitted to [facility]. He is voluntary. Pa  Pt remains in agreement with treatment plan. Pt Continues to deny and side-effects to current medications. Pt was polite and cordal during the interview process. Has been taking medications and has been compliant with treatment. Tolerating medications without side effect complaints. He has been  x 2 and  x 2. Pt noted he is doing \"pretty good today. \"  Pt noted he feels safe and comfortable on the unit. Pt was polite and cordial during the interview process. Currently he denies SI/HI. He is reporting Auditory hallucinations; he reports hearing soft conversational voices in the background that sometimes say his name. He is now reporting they are command voices requesting he do \"perverted things\". He reports visual hallucination stating \"I see shadows. \" He is also reporting a history of delusions with [employer]"  And then reported a dream where he thought his brother and sister were killed. After calling them he was consoled. Denies S/S of bipolar disorder.  Again does report hx of delusions and auditory and visual hallucinations in the past.  States He rates his depression as \"2\" on a scale of 0 to 10 with 0 being none and 10 being horrible. He rates his anxiety as \"1\" on the same scale. He states he was able to sleep last night; per staff he slept 6.5 hours. He is oriented x 3. He is bright and reactive on unit. Socializing well with staff. Hemoglobin A1C is 6.5. BuN elevated. Hospitalist started 12.5 daily alogliptin for elevated hemoglobin A1C. Note that patient was continent prior to hospitalization. BB started. Patient is not continent through out the day. Hospitalist to be notified. RN working on toilet schedule. Attended treatment team and face to face with patient 15minutes    No Known Allergies    Medications Prior to Admission: traZODone (DESYREL) 50 MG tablet, Take 1 tablet by mouth nightly  losartan (COZAAR) 25 MG tablet, Take 1 tablet by mouth daily  metoprolol tartrate (LOPRESSOR) 50 MG tablet, Take 1 tablet by mouth 2 times daily  amLODIPine (NORVASC) 10 MG tablet, Take 1 tablet by mouth daily  aspirin EC 81 MG EC tablet, Take 1 tablet by mouth daily    History reviewed. No pertinent past medical history. Patient Active Problem List   Diagnosis    Generalized weakness    [facility] [address]    Atrial fibrillation with RVR ([facility] [address]    Major depressive disorder, recurrent episode, severe, with psychosis ([facility] [address]       Review of Systems    OBJECTIVE  Vital Signs:  Vitals:    12/20/20 0745   BP: 133/76   Pulse: 77   Resp: 16   Temp: 97.4 °F (36.3 °C)   SpO2: 100%       Labs:  No results found for this or any previous visit (from the past 48 hour(s)). PSYCHIATRIC ASSESSMENT / MENTAL STATUS EXAM:   Vitals: Blood pressure 133/76, pulse 77, temperature 97.4 °F (36.3 °C), temperature source Temporal, resp. rate 16, height 5' 10\" (1.778 m), weight 150 lb (68 kg), SpO2 100 %. CONSTITUTIONAL:    Appearance: appears stated age. alert and oriented to person, place, time & situation. no acute distress. Adequate grooming and hygeine. Good eye contact.  No prominent physical abnormalities. Attitude: Manner is cooperative and pleasant  Motor: No psychomotor agitation, retardation or abnormal movements noted  Speech: Clearly articulated; normal rate, volume, tone & amount. Language: intact understanding and production  Mood: ok  Affect: euthymic, full range, non-labile, congruent with mood and content of speech  Thought Production: Spontaneous. Thought Form: Coherent, linear, logical & goal-directed. No tangentiality or circumstantiality. No flight of ideas or loosening of associations. Thought Content/Perceptions: No YOSELIN, no AVH, no delusion  Insight: questionable  Judgment- questionable  Memory: Immediate, recent, and remote appear intact, though not formally tested. Attention: maintained throughout interview  Fund of knowledge: Average  Gait/Balance: WNL         IMPRESSION:   Major depressive disorder, recurrent episode, severe with psychosis  R/o Schizoaffective disorder             PLAN:  Psychiatric management:medication initiation and titration, group and individual therapy, safe and theraputic environment. Status of problem/condition: ?Improving  Medical [facility]morbidities[facility] appreciate assistance  Legal Status:Pending  Disposition:estimated LOS: Pending. The treatment team reviewed with the patient the diagnosis and treatment recommendations to include the risks, benefits, and side effects of chosen medications. The patient verbalized understanding and agreed with the treatment regimen as outlined above. The patient was encouraged to participate in groups. Medical records, Labs, Diagnotic tests reviewed  q15 min safety checks for safety  Interval History. Review current labs- Hospitalist notified of elevated BUN and Hemoglobin A1c. Increase abilify 15mg po hs,  zoloft 50 mg daily, and trazodone 50 mg po hs. Alogliptin 12.5 daily started by hospitalist for elevated HgA1c.   Supportive Therapy Provided  Pt had an opportunity to ask questions and address concerns  Pt encouraged to continue therapy group or individual.  Pt was in agreement with treatment plan. The risks benefits and side effects of medications were discussed with the patient, including alternatives and no treatment. Pt was offered a mask, however pt is not not required to wear it, due to decrease communications, poor memory, exacerbation of mental illness, breathing and interaction with staff, additionally reduction in involvement with other pt. Pt was notified that at anytime they can have a mask if they wish pt was in agreement. The patient was counseled at length about the risks of ryan Covid-19 All of the risks, benefits, and alternatives were discussed.     Electronically signed by DESIREE Hopson CNP on 12/20/2020 at 10:08 AM

## 2020-12-20 NOTE — BH NOTE
Chago Barber was visible on the milieu and spent time watching TV. He is polite and friendly. Cooperative with vitals and medication compliant. He stated that he has had a couple of small bowel movements today and feels like he needs to do more. He denied all psych symptoms. His affect is brighter and he is future oriented. He spoke to his brother today about paying his rent and electric bill. He is looking forward to going home before [place]. Will continue to monitor for safety. Pt's blood pressure was decreased. Noam was called for advice on whether to give Lopressor. He advised to hold medication and recheck blood pressure at 23:00. Blood pressure was increased at 23:05. Noam instructed to give held Lopressor at this time.

## 2020-12-21 PROCEDURE — 6370000000 HC RX 637 (ALT 250 FOR IP): Performed by: NURSE PRACTITIONER

## 2020-12-21 PROCEDURE — 97110 THERAPEUTIC EXERCISES: CPT

## 2020-12-21 PROCEDURE — 99233 SBSQ HOSP IP/OBS HIGH 50: CPT | Performed by: NURSE PRACTITIONER

## 2020-12-21 PROCEDURE — 6370000000 HC RX 637 (ALT 250 FOR IP): Performed by: INTERNAL MEDICINE

## 2020-12-21 PROCEDURE — 1240000000 HC EMOTIONAL WELLNESS R&B

## 2020-12-21 PROCEDURE — 97116 GAIT TRAINING THERAPY: CPT

## 2020-12-21 PROCEDURE — 6370000000 HC RX 637 (ALT 250 FOR IP): Performed by: PSYCHIATRY & NEUROLOGY

## 2020-12-21 RX ORDER — SIMETHICONE 80 MG
80 TABLET,CHEWABLE ORAL EVERY 6 HOURS PRN
Status: DISCONTINUED | OUTPATIENT
Start: 2020-12-21 | End: 2020-12-28 | Stop reason: HOSPADM

## 2020-12-21 RX ADMIN — METOPROLOL TARTRATE 50 MG: 50 TABLET, FILM COATED ORAL at 08:10

## 2020-12-21 RX ADMIN — POLYETHYLENE GLYCOL (3350) 17 G: 17 POWDER, FOR SOLUTION ORAL at 08:24

## 2020-12-21 RX ADMIN — ALOGLIPTIN 12.5 MG: 12.5 TABLET, FILM COATED ORAL at 08:10

## 2020-12-21 RX ADMIN — ASPIRIN 81 MG: 81 TABLET, COATED ORAL at 08:08

## 2020-12-21 RX ADMIN — LOSARTAN POTASSIUM 25 MG: 25 TABLET ORAL at 08:10

## 2020-12-21 RX ADMIN — METOPROLOL TARTRATE 50 MG: 50 TABLET, FILM COATED ORAL at 21:09

## 2020-12-21 RX ADMIN — APIXABAN 5 MG: 5 TABLET, FILM COATED ORAL at 21:09

## 2020-12-21 RX ADMIN — SIMETHICONE 80 MG: 80 TABLET, CHEWABLE ORAL at 16:42

## 2020-12-21 RX ADMIN — APIXABAN 5 MG: 5 TABLET, FILM COATED ORAL at 08:09

## 2020-12-21 RX ADMIN — TRAZODONE HYDROCHLORIDE 50 MG: 50 TABLET ORAL at 21:09

## 2020-12-21 RX ADMIN — ARIPIPRAZOLE 15 MG: 10 TABLET ORAL at 21:09

## 2020-12-21 RX ADMIN — SERTRALINE HYDROCHLORIDE 50 MG: 50 TABLET ORAL at 08:09

## 2020-12-21 RX ADMIN — AMLODIPINE BESYLATE 10 MG: 10 TABLET ORAL at 08:08

## 2020-12-21 ASSESSMENT — PAIN SCALES - GENERAL
PAINLEVEL_OUTOF10: 0

## 2020-12-21 NOTE — PLAN OF CARE
Problem: Falls - Risk of:  Goal: Will remain free from falls  Description: Will remain free from falls  Outcome: Ongoing  Goal: Absence of physical injury  Description: Absence of physical injury  Outcome: Ongoing     Problem: Anger Management/Homicidal Ideation:  Goal: Able to display appropriate communication and problem solving  Description: Able to display appropriate communication and problem solving  Outcome: Ongoing  Goal: Ability to verbalize frustrations and anger appropriately will improve  Description: Ability to verbalize frustrations and anger appropriately will improve  Outcome: Ongoing  Goal: Absence of angry outbursts  Description: Absence of angry outbursts  Outcome: Ongoing  Goal: Absence of homicidal ideation  Description: Absence of homicidal ideation  Outcome: Ongoing  Goal: Participates in care planning  Description: Participates in care planning  Outcome: Ongoing  Goal: Patient specific goal  Description: Patient specific goal  Outcome: Ongoing     Problem: Altered Mood, Depressive Behavior:  Goal: Able to verbalize acceptance of life and situations over which he or she has no control  Description: Able to verbalize acceptance of life and situations over which he or she has no control  Outcome: Ongoing  Goal: Able to verbalize and/or display a decrease in depressive symptoms  Description: Able to verbalize and/or display a decrease in depressive symptoms  Outcome: Ongoing  Goal: Ability to disclose and discuss suicidal ideas will improve  Description: Ability to disclose and discuss suicidal ideas will improve  Outcome: Ongoing  Goal: Able to verbalize support systems  Description: Able to verbalize support systems  Outcome: Ongoing  Goal: Absence of self-harm  Description: Absence of self-harm  Outcome: Ongoing  Goal: Patient specific goal  Description: Patient specific goal  Outcome: Ongoing  Goal: Participates in care planning  Description: Participates in care planning  Outcome: Ongoing     Problem: Depressive Behavior With or Without Suicide Precautions:  Goal: Able to verbalize acceptance of life and situations over which he or she has no control  Description: Able to verbalize acceptance of life and situations over which he or she has no control  Outcome: Ongoing  Goal: Able to verbalize and/or display a decrease in depressive symptoms  Description: Able to verbalize and/or display a decrease in depressive symptoms  Outcome: Ongoing  Goal: Ability to disclose and discuss suicidal ideas will improve  Description: Ability to disclose and discuss suicidal ideas will improve  Outcome: Ongoing  Goal: Able to verbalize support systems  Description: Able to verbalize support systems  Outcome: Ongoing  Goal: Absence of self-harm  Description: Absence of self-harm  Outcome: Ongoing  Goal: Patient specific goal  Description: Patient specific goal  Outcome: Ongoing  Goal: Participates in care planning  Description: Participates in care planning  Outcome: Ongoing     Problem: Altered Mood, Deterioration in Function:  Goal: Ability to perform activities of daily living will improve  Description: Ability to perform activities of daily living will improve  Outcome: Ongoing  Goal: Able to verbalize reality based thinking  Description: Able to verbalize reality based thinking  Outcome: Ongoing  Goal: Skin appearance normal  Description: Skin appearance normal  Outcome: Ongoing  Goal: Maintenance of adequate nutrition will improve  Description: Maintenance of adequate nutrition will improve  Outcome: Ongoing  Goal: Ability to tolerate increased activity will improve  Description: Ability to tolerate increased activity will improve  Outcome: Ongoing  Goal: Participates in care planning  Description: Participates in care planning  Outcome: Ongoing  Goal: Patient specific goal  Description: Patient specific goal  Outcome: Ongoing     Problem: Risk of Harm:  Goal: Ability to remain free from injury will improve  Description: Ability to remain free from injury will improve  Outcome: Ongoing     Problem: Altered Mood, Manic Behavior:  Goal: Able to sleep  Description: Able to sleep  Outcome: Ongoing  Goal: Able to verbalize decrease in frequency and intensity of racing thoughts  Description: Able to verbalize decrease in frequency and intensity of racing thoughts  Outcome: Ongoing  Goal: Ability to disclose and discuss suicidal ideas will improve  Description: Ability to disclose and discuss suicidal ideas will improve  Outcome: Ongoing  Goal: Absence of self-harm  Description: Absence of self-harm  Outcome: Ongoing  Goal: Ability to achieve adequate nutritional intake will improve  Description: Ability to achieve adequate nutritional intake will improve  Outcome: Ongoing  Goal: Ability to interact with others will improve  Description: Ability to interact with others will improve  Outcome: Ongoing  Goal: Ability to demonstrate self-control will improve  Description: Ability to demonstrate self-control will improve  Outcome: Ongoing  Goal: Mood stable  Description: Mood stable  Outcome: Ongoing  Goal: Patient specific goal  Description: Patient specific goal  Outcome: Ongoing     Problem: Altered Mood, Psychotic Behavior:  Goal: Able to demonstrate trust by eating, participating in treatment and following staff's direction  Description: Able to demonstrate trust by eating, participating in treatment and following staff's direction  Outcome: Ongoing  Goal: Able to verbalize decrease in frequency and intensity of hallucinations  Description: Able to verbalize decrease in frequency and intensity of hallucinations  Outcome: Ongoing  Goal: Able to verbalize reality based thinking  Description: Able to verbalize reality based thinking  Outcome: Ongoing  Goal: Absence of self-harm  Description: Absence of self-harm  Outcome: Ongoing  Goal: Ability to achieve adequate nutritional intake will improve  Description: Ability to achieve adequate nutritional intake will improve  Outcome: Ongoing  Goal: Ability to interact with others will improve  Description: Ability to interact with others will improve  Outcome: Ongoing  Goal: Compliance with prescribed medication regimen will improve  Description: Compliance with prescribed medication regimen will improve  Outcome: Ongoing  Goal: Patient specific goal  Description: Patient specific goal  Outcome: Ongoing     Problem: Substance Abuse:  Goal: Absence of drug withdrawal signs and symptoms  Description: Absence of drug withdrawal signs and symptoms  Outcome: Ongoing  Goal: Participates in care planning  Description: Participates in care planning  Outcome: Ongoing  Goal: Patient specific goal  Description: Patient specific goal  Outcome: Ongoing

## 2020-12-21 NOTE — GROUP NOTE
Group Therapy Note    Date: 12/21/2020    Group Start Time: 3716  Group End Time: 4528  Group Topic: Psychoeducation    [address], [place], [place]        Group Therapy Note    Attendees: 4/5       Notes:  Pts participated in recreational therapy group; Old Remedies and Wives Tales. Pts and therapist utilized various activities to reminisce about their past and share stories. Purpose was to encourage positive thinking and socialization. Pt participated actively in the group. Pt shared memories of oatmeal baths and utilizing castor oil. Status After Intervention:  Improved    Participation Level:  Active Listener and Interactive    Participation Quality: Appropriate, Attentive and Sharing      Speech:  normal      Thought Process/Content: Logical      Affective Functioning: Congruent      Mood: Bright      Level of consciousness:  Alert and Attentive      Response to Learning: Able to verbalize current knowledge/experience and Able to verbalize/acknowledge new learning      Endings: None Reported    Modes of Intervention: Support, Socialization, Exploration and Activity      Discipline Responsible: Certified Therapeutic Recreation Specialist       Signature:  Jayna [place]

## 2020-12-21 NOTE — BH NOTE
Patient has been up on the unit today, pleasant, directable, medication compliant and offers complaint that he is having difficulties with feeling bloated with gas and having difficulties having a BM. Prune juice and glycolax given today as well as a new prescription of simethicone. He at this point he has only had very small BM's. Water encouraged and tolerated. Has remained free from falls. Continue monitoring while on the SBU.

## 2020-12-21 NOTE — PLAN OF CARE
[address] [place] [address]  Day 3 Interdisciplinary Treatment Plan NOTE    Review Date & Time: 12/19 0830    Admission Type:   Admission Type: Voluntary    Reason for admission:  Reason for Admission: \"to get my mind right\"    Patient Diagnosis:Major depressive disorder, recurrent episodes, severe with psychosis    PATIENT STRENGTHS:  Patient Strengths Strengths: Motivated, Positive Support  Patient Strengths and Limitations:Limitations: General negative or hopeless attitude about future/recovery  Addictive Behavior:Addictive Behavior  In the past 3 months, have you felt or has someone told you that you have a problem with:  : None  Do you have a history of Chemical Use?: No  Do you have a history of Alcohol Use?: No  Do you have a history of Street Drug Abuse?: No  Histroy of Prescripton Drug Abuse?: No  Medical Problems:History reviewed. No pertinent past medical history. Risk:  Fall Risk Total: 83  Bret Scale Bret Scale Score: 19  BVC Total: 0      Status EXAM:   Status and Exam  Normal: Yes  Facial Expression: Brightened  Affect: Appropriate  Level of Consciousness: Alert  Mood:Normal: No  Mood: Anxious  Motor Activity:Normal: Yes  Interview Behavior: Cooperative  Preception: Norman to Person, Elby Sable to Time, Norman to Place, Norman to Situation  Attention:Normal: No  Attention: Distractible  Thought Processes: Circumstantial  Thought Content:Normal: No  Thought Content: Poverty of Content  Hallucinations:  Auditory (Comment)  Delusions: No  Memory:Normal: No  Memory: Poor Recent  Insight and Judgment: No  Insight and Judgment: Poor Insight  Present Suicidal Ideation: No  Present Homicidal Ideation: No    Daily Assessment Last Entry:   Daily Sleep (WDL): Exceptions to WDL  Patient Currently in Pain: Denies  Daily Nutrition (WDL): Within Defined Limits    Patient Monitoring:  Frequency of Checks: 4 times per hour, close    Psychiatric Symptoms:   Depression Symptoms  Depression Symptoms: No problems reported or observed. Anxiety Symptoms  Anxiety Symptoms: Generalized  Maria Alejandra Symptoms  Maria Alejandra Symptoms: No problems reported or observed. Psychosis Symptoms  Delusion Type: No problems reported or observed.     Suicide Risk CSSR-S:  1) Within the past month, have you wished you were dead or wished you could go to sleep and not wake up? : No  2) Have you actually had any thoughts of killing yourself? : No  6) Have you ever done anything, started to do anything, or prepared to do anything to end your life?: No        EDUCATION:   Learner Progress Toward Treatment Goals: Reviewed results and recommendations of this team    Method: Group    Outcome: Verbalized understanding    PATIENT GOALS: med titration, return to prior LOC    PLAN/TREATMENT RECOMMENDATIONS UPDATE: med titration, participation in groups    Estimated Length of Stay Update:  10 days  Estimated Discharge Date Update: 12/28/20  Discharge criteria: med titration and compliance with unit programming      SHORT-TERM GOALS UPDATE:   Time frame for Short-Term Goals: 7 days    LONG-TERM GOALS UPDATE:   Time frame for Long-Term Goals: 10 days  Members Present in Team Meeting: See Signature Sheet    ISRRAEL Shaffer

## 2020-12-21 NOTE — PROGRESS NOTES
Physical Therapy    Physical Therapy Treatment Note  Name: Vu Bowers MRN: [id number] :   1950   Date:  2020   Admission Date: 2020 Room:  1046/1046-01   Restrictions/Precautions:  Restrictions/Precautions  Restrictions/Precautions: General Precautions, Fall Risk      Communication with other providers:  Nursing aware that therapy is working with patient  Subjective:  Patient states:  \"I want to use a cane when I go home\"  Pain:   Location, Type, Intensity (0/10 to 10/10): Does not report  Objective:    Observation:  P sitting up in chair  Treatment, including education/measures:  Transfers: P performs sit <> stand with CGA x 2 reps to RW  Gait: P ambulates with RW with SBA x 20' x 4 lengths with cues to stand closer to RW. P performs forward/backward ambulation with 1 UE support on wall rail x 4 lengths x 14' and marching x 4' 14' each. P with SOB following activity requiring seated rest break. There. Ex: p performs seated LAQ, marching, heel raises x 10 reps with cues for activity  P educated on  Plan of care and safety  P left sitting up in chair in main room  Assessment / Impression:    P continues to demonstrate impaired balance and gait.  Recommend continued skilled PT to address deficits and maximize functional potential.   Patient's tolerance of treatment:  good   Adverse Reaction: none  Significant change in status and impact:  Improved gait  Barriers to improvement:  none  Plan for Next Session:    Steps, gait with cane  Time in:  1311  Time out:  1135  Timed treatment minutes:  24  Total treatment time:  24    Previously filed items:  Social/Functional History  Lives With: Alone  Type of Home: Apartment  Home Layout: One level  Home Access: Level entry  Entrance Stairs - Number of Steps: 10-15 steps to apartment door  Entrance Stairs - Rails: Right  Bathroom Shower/Tub: Walk-in shower  Bathroom Toilet: Standard  Bathroom Accessibility: Not accessible  Receives Help From: Family  ADL Assistance: Independent  Homemaking Assistance: Independent  Homemaking Responsibilities: Yes  Meal Prep Responsibility: Primary  Laundry Responsibility: Primary  Cleaning Responsibility: Primary  Bill Paying/Finance Responsibility: Primary  Shopping Responsibility: Primary  Dependent Care Responsibility: Primary  Health Care Management: Primary  Ambulation Assistance: Independent  Transfer Assistance: Independent  Active : No  Mode of Transportation: Bus  Occupation: Retired  Type of occupation: Patient states he worked various jobs in his lifetime. Leisure & Hobbies: video games  Additional Comments: Pt may be a poor historian. Recommend follow-up.   Short term goals  Time Frame for Short term goals: 1 week  Short term goal 1: Pt to complete all bed mobility mod I  Short term goal 2: Pt to complete all STS transfers to/from bed, commode, and chair mod I  Short term goal 3: Pt to ambulate 150' with LRAD mod I  Short term goal 4: P will ascend/descend step x 15 reps with 1 support rail and Cyril       Electronically signed by:    Daksha Alvarez PT  12/21/2020, 1:49 PM

## 2020-12-21 NOTE — GROUP NOTE
Group Therapy Note    Date: 12/21/2020    Group Start Time: 0845  Group End Time: 3919    Number of Participants: 3/5    Type: Morning Goals Group/ Community Meeting    Group Topic/Objective: Set Goal For The Day and to review Unit Rules and Regulations. Patient's Goal:  Pt states his goal is \"Get rid of this pain in my stomach. \"    Notes:  Pt presented as pleasant and cooperative during group. Pt states he is feeling \"fine\" and is grateful \"that I'm still alive. \"  Pt reports restful sleep of ~6 hours. Depression (0-10): 0    Anxiety (0-10): 0    Irritability/Aggitation (0-10): 0    Status After Intervention:  Improved    Participation Level:  Active Listener and Interactive    Participation Quality: Appropriate, Attentive and Sharing    Speech:  normal    Thought Process/Content: Logical    Affective Functioning: Blunted    Mood: Blunted    Level of consciousness:  Alert and Attentive    Response to Learning: Able to verbalize current knowledge/experience    Endings: None Reported    Modes of Intervention: Education, Support and Socialization    Discipline Responsible: Certified Therapeutic Recreation Specialist     Electronically signed by Olamide Cerna MA on 12/21/2020 at 9:47 AM

## 2020-12-21 NOTE — PROGRESS NOTES
daily  metoprolol tartrate (LOPRESSOR) 50 MG tablet, Take 1 tablet by mouth 2 times daily  amLODIPine (NORVASC) 10 MG tablet, Take 1 tablet by mouth daily  aspirin EC 81 MG EC tablet, Take 1 tablet by mouth daily    History reviewed. No pertinent past medical history. Patient Active Problem List   Diagnosis    Generalized weakness    Recurrent major depressive disorder ([facility]    [facility]    Major depressive disorder, recurrent episode, severe, with psychosis ([facility]       Review of Systems    OBJECTIVE  Vital Signs:  Vitals:    12/21/20 0808   BP: (!) 144/85   Pulse: 75   Resp: 16   Temp: 97.6 °F (36.4 °C)   SpO2: 99%       Labs:  No results found for this or any previous visit (from the past 48 hour(s)). PSYCHIATRIC ASSESSMENT / MENTAL STATUS EXAM:   Vitals: Blood pressure (!) 144/85, pulse 75, temperature 97.6 °F (36.4 °C), temperature source Temporal, resp. rate 16, height 5' 10\" (1.778 m), weight 150 lb (68 kg), SpO2 99 %. CONSTITUTIONAL:    Appearance: Appears stated age. Alert and oriented to person, place, time & situation. No acute distress. Adequate grooming and hygiene. Good eye contact. No prominent physical abnormalities. Attitude: Manner is cooperative and pleasant  Motor: No psychomotor agitation, retardation or abnormal movements noted  Speech: Clearly articulated; normal rate, volume, tone & amount. Language: intact understanding and production  Mood: depressed, anxious  Affect: euthymic, full range, non-labile, congruent with mood and content of speech  Thought Production: Spontaneous. Thought Form: Coherent, linear, logical & goal-directed. No tangentiality or circumstantiality. No flight of ideas or loosening of associations. Thought Content/Perceptions: No YOSELIN, no AVH, no delusion  Insight: questionable  Judgment: questionable  Memory: Immediate, recent, and remote appear intact, though not formally tested.   Attention: maintained throughout interview  KnowledgeTree of knowledge: Average  Gait/Balance: not assessed    IMPRESSION:   Major depressive disorder, recurrent episode, severe with psychosis  R/o Schizoaffective disorder    PLAN:  Psychiatric management:medication initiation and titration, group and individual therapy, safe and therapeutic environment. Status of problem/condition: Improving  Medical co-morbidities: Management per [facility] group, appreciate assistance  Legal Status: Pending  Disposition:estimated LOS: Pending. The treatment team reviewed with the patient the diagnosis and treatment recommendations to include the risks, benefits, and side effects of chosen medications. The patient verbalized understanding and agreed with the treatment regimen as outlined above. The patient was encouraged to participate in groups. Medical records, Labs, Diagnotic tests reviewed  q15 min safety checks for safety  Interval History  Review current labs  Continue current medications. Start simethicone for bloating/flatulence  Will check post void residual q shift to rule out overflow incontinence  Supportive Therapy Provided  Pt had an opportunity to ask questions and address concerns  Pt encouraged to continue therapy group or individual.  Pt was in agreement with treatment plan. The risks benefits and side effects of medications were discussed with the patient, including alternatives and no treatment. Pt was offered a mask, however pt is not required to wear it, due to decrease communications, poor memory, exacerbation of mental illness, breathing and interaction with staff, additionally reduction in involvement with other pt. Pt was notified that at anytime they can have a mask if they wish pt was in agreement. The patient was counseled at length about the risks of ryan COVID-19 All of the risks, benefits, and alternatives were discussed.     Electronically signed by DESIREE Knutson CNP on 12/21/2020 at 12:03 PM

## 2020-12-22 PROCEDURE — 1240000000 HC EMOTIONAL WELLNESS R&B

## 2020-12-22 PROCEDURE — 6370000000 HC RX 637 (ALT 250 FOR IP): Performed by: PSYCHIATRY & NEUROLOGY

## 2020-12-22 PROCEDURE — 6370000000 HC RX 637 (ALT 250 FOR IP): Performed by: NURSE PRACTITIONER

## 2020-12-22 PROCEDURE — 97116 GAIT TRAINING THERAPY: CPT

## 2020-12-22 PROCEDURE — 99233 SBSQ HOSP IP/OBS HIGH 50: CPT | Performed by: NURSE PRACTITIONER

## 2020-12-22 PROCEDURE — 6370000000 HC RX 637 (ALT 250 FOR IP): Performed by: INTERNAL MEDICINE

## 2020-12-22 RX ADMIN — ARIPIPRAZOLE 15 MG: 10 TABLET ORAL at 21:05

## 2020-12-22 RX ADMIN — ALOGLIPTIN 12.5 MG: 12.5 TABLET, FILM COATED ORAL at 08:10

## 2020-12-22 RX ADMIN — APIXABAN 5 MG: 5 TABLET, FILM COATED ORAL at 21:06

## 2020-12-22 RX ADMIN — TRAZODONE HYDROCHLORIDE 50 MG: 50 TABLET ORAL at 21:06

## 2020-12-22 RX ADMIN — ASPIRIN 81 MG: 81 TABLET, COATED ORAL at 08:10

## 2020-12-22 RX ADMIN — APIXABAN 5 MG: 5 TABLET, FILM COATED ORAL at 08:10

## 2020-12-22 RX ADMIN — LOSARTAN POTASSIUM 25 MG: 25 TABLET ORAL at 08:10

## 2020-12-22 RX ADMIN — METOPROLOL TARTRATE 50 MG: 50 TABLET, FILM COATED ORAL at 21:05

## 2020-12-22 RX ADMIN — SIMETHICONE 80 MG: 80 TABLET, CHEWABLE ORAL at 14:54

## 2020-12-22 RX ADMIN — AMLODIPINE BESYLATE 10 MG: 10 TABLET ORAL at 08:11

## 2020-12-22 RX ADMIN — SERTRALINE HYDROCHLORIDE 50 MG: 50 TABLET ORAL at 08:11

## 2020-12-22 RX ADMIN — METOPROLOL TARTRATE 50 MG: 50 TABLET, FILM COATED ORAL at 08:10

## 2020-12-22 ASSESSMENT — PAIN SCALES - GENERAL
PAINLEVEL_OUTOF10: 0
PAINLEVEL_OUTOF10: 0

## 2020-12-22 NOTE — GROUP NOTE
Group Therapy Note    Date: 12/22/2020    Group Start Time: 1300  Group End Time: 1330     Number of Participants: 3/5    Type: Exercise/Recreation Group    Group Topic/Objective: Chair Exercises     Notes:  Pt struggled to participate d/t lethargy. Pt did not respond to prompting/encouragement. Status After Intervention:  Unchanged    Participation Level: Minimal    Participation Quality: Lethargic    Speech:  normal    Thought Process/Content: Logical    Affective Functioning: Blunted    Mood: Blunted    Level of consciousness:  Drowsy    Response to Learning: Pt unable to demonstrate response to learning at this time.      Endings: None Reported    Modes of Intervention: Activity and Movement    Discipline Responsible: Certified Therapeutic Recreation Specialist     Electronically signed by Abbie Mcfarland [facility], MA on 12/22/2020 at 2:25 PM

## 2020-12-22 NOTE — PROGRESS NOTES
Physical Therapy    Physical Therapy Treatment Note  Name: Estela Dick MRN: [id number] :   1950   Date:  2020   Admission Date: 2020 Room:  [facility]61046-01   Restrictions/Precautions:  Restrictions/Precautions  Restrictions/Precautions: General Precautions, Fall Risk       Communication with other providers:  Nursing okayed PT working with patient  Subjective:  Patient states:  P agreeable to working with therapy  Pain:   Location, Type, Intensity (0/10 to 10/10):  5/10 stomach pain  Objective:    Observation:  P sitting up in chair  Treatment, including education/measures:  Transfers: sit <> stand x 2 reps with supervision  Gait: Ambulates with SC x 100' with CGA to SBA x 2 bouts. P with mild lateral sway and cues to reduce speed. P holds cane in R hand  Stairs: P ascend/descend 6\" step with step to pattern and mod cues. P with HR on R and SC on L. P leads with RLE ascent and LLE descent. P performs x 6 reps, then 5 reps  P educated on safety with cane and step pattern  P left sitting up in chair in main room with nursing aware. P to continue to use RW on unit. Assessment / Impression:    P demonstrates improved gait but with mild imbalance with use of SC. P would benefit from continued skilled PT to use and cane and improve steps.    Patient's tolerance of treatment: good   Adverse Reaction: none  Significant change in status and impact:  Improved gait  Barriers to improvement:  none  Plan for Next Session:    Gait, stairs  Time in:  1335  Time out:  1350  Timed treatment minutes:  15  Total treatment time:  15    Previously filed items:  Social/Functional History  Lives With: Alone  Type of Home: Apartment  Home Layout: One level  Home Access: Level entry  Entrance Stairs - Number of Steps: 10-15 steps to apartment door  Entrance Stairs - Rails: Right  Bathroom Shower/Tub: Walk-in shower  Bathroom Toilet: Standard  Bathroom Accessibility: Not accessible  Receives Help From: Family  ADL Assistance: Independent  Homemaking Assistance: Independent  Homemaking Responsibilities: Yes  Meal Prep Responsibility: Primary  Laundry Responsibility: Primary  Cleaning Responsibility: Primary  Bill Paying/Finance Responsibility: Primary  Shopping Responsibility: Primary  Dependent Care Responsibility: Primary  Health Care Management: Primary  Ambulation Assistance: Independent  Transfer Assistance: Independent  Active : No  Mode of Transportation: Bus  Occupation: Retired  Type of occupation: Patient states he worked various jobs in his lifetime. Leisure & Hobbies: video games  Additional Comments: Pt may be a poor historian. Recommend follow-up.   Short term goals  Time Frame for Short term goals: 1 week  Short term goal 1: Pt to complete all bed mobility mod I  Short term goal 2: Pt to complete all STS transfers to/from bed, commode, and chair mod I  Short term goal 3: Pt to ambulate 150' with LRAD mod I  Short term goal 4: P will ascend/descend step x 15 reps with 1 support rail and Cyril       Electronically signed by:    Roosevelt Méndez, PT  12/22/2020, 1:59 PM

## 2020-12-22 NOTE — PLAN OF CARE
Problem: Falls - Risk of:  Goal: Will remain free from falls  Description: Will remain free from falls  12/21/2020 2216 by Kari Hui RN  Outcome: Met This Shift  12/21/2020 1157 by Denver Gitelman, RN  Outcome: Ongoing  Goal: Absence of physical injury  Description: Absence of physical injury  12/21/2020 2216 by Kari Hui RN  Outcome: Met This Shift  12/21/2020 1157 by Denver Gitelman, RN  Outcome: Ongoing

## 2020-12-22 NOTE — GROUP NOTE
Group Therapy Note    Date: 12/22/2020    Group Start Time: 1400  Group End Time: 6332  Group Topic: [facility]    ISRRAEL Loo        Group Therapy Note    Attendees: 4/5         Notes:  Patient attended and participated in group    Status After Intervention:  Improved    Participation Level:  Active Listener and Interactive    Participation Quality: Appropriate      Speech:  normal      Thought Process/Content: Logical      Affective Functioning: Congruent      Mood: euthymic      Level of consciousness:  Alert and Attentive      Response to Learning: Able to verbalize current knowledge/experience      Endings: None Reported    Modes of Intervention: Support and Socialization      Discipline Responsible: /Counselor      Signature:  ISRRAEL Zimmer

## 2020-12-22 NOTE — PROGRESS NOTES
Psychiatric Progress Note    Vu Bowers  [id number]  12/22/20    CC: \"I was having headaches, losing my balance, my legs got weak. \"     HPI: Patient noted a fall on 12/11/2020 and came to [facility]. He denied any symptoms of chest pain, SOB prior to this. Patient is poor historian, kept saying though interview  \"I am going to die, what is the point of all these? \" kept saying death is near, would not say why he felt so. He denies any suicidal ideation or homicidal ideation. Patient notes there has been very generalized weakness and fatigue in the past couple of days, denies an fever or chills, no chest pain, palpitations , dizziness, cough or shortness of breath, no nausea, vomiting or urinary symptoms. Patient admitted to [facility]. He is voluntary. Pt remains in agreement with treatment plan. Pt Continues to deny and side-effects to current medications. Pt was polite and cordial during the interview process. Has been taking medications and has been compliant with treatment. Tolerating medications without side effect complaints. Currently he denies SI/HI. He denies AV hallucinations. States he rates his depression as \"2\" on a scale of 0 to 10 with 0 being none and 10 being horrible. He rates his anxiety as \"0\" on the same scale. He states he was able to sleep peacefully last night; per staff he slept 8 hours. States his appetite is improving. He is oriented x 3. He is bright and reactive on unit. Socializing well with staff. No Known Allergies    Medications Prior to Admission: traZODone (DESYREL) 50 MG tablet, Take 1 tablet by mouth nightly  losartan (COZAAR) 25 MG tablet, Take 1 tablet by mouth daily  metoprolol tartrate (LOPRESSOR) 50 MG tablet, Take 1 tablet by mouth 2 times daily  amLODIPine (NORVASC) 10 MG tablet, Take 1 tablet by mouth daily  aspirin EC 81 MG EC tablet, Take 1 tablet by mouth daily    History reviewed. No pertinent past medical history. Patient Active Problem List   Diagnosis    Generalized weakness    Recurrent [facility] depressive disorder ([facility]    [facility]    Major depressive disorder, recurrent episode, severe, with psychosis ([facility]       Review of Systems    OBJECTIVE  Vital Signs:  Vitals:    12/22/20 0810   BP: (!) 142/74   Pulse: 72   Resp: 16   Temp: 97.6 °F (36.4 °C)   SpO2: 98%       Labs:  No results found for this or any previous visit (from the past 48 hour(s)). PSYCHIATRIC ASSESSMENT / MENTAL STATUS EXAM:   Vitals: Blood pressure (!) 142/74, pulse 72, temperature 97.6 °F (36.4 °C), temperature source Temporal, resp. rate 16, height 5' 10\" (1.778 m), weight 150 lb (68 kg), SpO2 98 %. CONSTITUTIONAL:    Appearance: Appears stated age. Alert and oriented to person, place, time & situation. No acute distress. Adequate grooming and hygiene. Good eye contact. No prominent physical abnormalities. Attitude: Manner is cooperative and pleasant  Motor: No psychomotor agitation, retardation or abnormal movements noted  Speech: Clearly articulated; normal rate, volume, tone & amount. Language: intact understanding and production  Mood: depressed, anxious  Affect: euthymic, full range, non-labile, congruent with mood and content of speech  Thought Production: Spontaneous. Thought Form: Coherent, linear, logical & goal-directed. No tangentiality or circumstantiality. No flight of ideas or loosening of associations. Thought Content/Perceptions: No YOSELIN, no AVH, no delusion  Insight: questionable  Judgment: questionable  Memory: Immediate, recent, and remote appear intact, though not formally tested.   Attention: maintained throughout interview  Fund of knowledge: Average  Gait/Balance: not assessed    IMPRESSION:   Major depressive disorder, recurrent episode, severe with psychosis  R/o Schizoaffective disorder    PLAN:  Psychiatric management:medication initiation and titration, group and individual therapy, safe and therapeutic environment. Status of problem/condition: Improving  Medical co-morbidities: Management per [facility] group, appreciate assistance  Legal Status: Pending  Disposition:estimated LOS: Pending. The treatment team reviewed with the patient the diagnosis and treatment recommendations to include the risks, benefits, and side effects of chosen medications. The patient verbalized understanding and agreed with the treatment regimen as outlined above. The patient was encouraged to participate in groups. Medical records, Labs, Diagnotic tests reviewed  q15 min safety checks for safety  Interval History  Review current labs  Continue current medications. Start simethicone for bloating/flatulence  Will check post void residual q shift to rule out overflow incontinence  Supportive Therapy Provided  Pt had an opportunity to ask questions and address concerns  Pt encouraged to continue therapy group or individual.  Pt was in agreement with treatment plan. The risks benefits and side effects of medications were discussed with the patient, including alternatives and no treatment. Pt was offered a mask, however pt is not required to wear it, due to decrease communications, poor memory, exacerbation of mental illness, breathing and interaction with staff, additionally reduction in involvement with other pt. Pt was notified that at anytime they can have a mask if they wish pt was in agreement. The patient was counseled at length about the risks of ryan COVID-19 All of the risks, benefits, and alternatives were discussed.     Electronically signed by DESIREE Arriola CNP on 12/22/2020 at 9:33 AM

## 2020-12-22 NOTE — BH NOTE
Adebayo Hunt has been visible on the milieu. He is polite and friendly. Denied all psych symptoms, He is social with select peers. He is compliant with medication and voices understanding of the medication he is taking. No complaints of stomach pain but did say he has only had a small bowel movement. He was encouraged to walk and drink plenty of water. Will continue monitoring for safety.

## 2020-12-22 NOTE — GROUP NOTE
Group Therapy Note    Date: 12/22/2020    Group Start Time: 0830  Group End Time: 0900    Number of Participants: 3/5    Type: Morning Goals Group/ Community Meeting    Group Topic/Objective: Set Goal For The Day and to review Unit Rules and Regulations. Patient's Goal:  Pt states his goal is \"Make it through the day. \"    Notes:  Pt presented as pleasant and cooperative during group. Pt states he is feeling \"fairly well\" and is grateful \"that I am still here. \"  Pt reports restful sleep of ~6 hours. Depression (0-10): 0    Anxiety (0-10): 0    Irritability/Aggitation (0-10): 0    Status After Intervention:  Improved    Participation Level:  Active Listener and Interactive    Participation Quality: Appropriate, Attentive and Sharing    Speech:  normal    Thought Process/Content: Logical    Affective Functioning: Congruent    Mood: Bright    Level of consciousness:  Alert and Attentive    Response to Learning: Able to verbalize current knowledge/experience    Endings: None Reported    Modes of Intervention: Education, Support and Socialization    Discipline Responsible: Certified Therapeutic Recreation Specialist     Electronically signed by Garry Mandel MA on 12/22/2020 at 9:58 AM

## 2020-12-22 NOTE — PROGRESS NOTES
Pt has been alert and oriented x4 during the shift. Pt is pleasant and social with other pts on the unit. Pt ambulating with a walker and is steady. Pt is med compliant and tolerating meals and fluids. Pt spoke with his brother this afternoon regarding his discharge on Monday. Pt denies anxiety, depression along with SI, HI or AVH. Pt has denied pain during the shift.

## 2020-12-22 NOTE — GROUP NOTE
Group Therapy Note    Date: 12/22/2020    Group Start Time: 1100  Group End Time: 1140  Group Topic: Psychoeducation    [address]    Bhavana Nuno, CTRS, MA        Group Therapy Note    Attendees: 3/5       Notes:  Pts participated in recreational therapy group; Relaxation Through Music. Pts were each allowed to pick a song they could listen to that will help them relax. Pts were encouraged to relax and socialize as the music was playing. Pt participated actively in the group. Pt shared stories r/t the songs he picked. Pt expressed enjoyment in the activity. Status After Intervention:  Improved    Participation Level:  Active Listener and Interactive    Participation Quality: Appropriate, Attentive and Sharing      Speech:  normal      Thought Process/Content: Logical      Affective Functioning: Congruent      Mood: Bright      Level of consciousness:  Alert and Attentive      Response to Learning: Able to verbalize current knowledge/experience and Able to verbalize/acknowledge new learning      Endings: None Reported    Modes of Intervention: Support, Socialization, Exploration and Activity      Discipline Responsible: Certified Therapeutic Recreation Specialist       Signature:  Bhavana Nuno, [address] [place] [address]

## 2020-12-23 PROBLEM — Z74.09 IMPAIRED MOBILITY: Status: ACTIVE | Noted: 2020-12-23

## 2020-12-23 PROBLEM — R26.89 POOR BALANCE: Status: ACTIVE | Noted: 2020-12-23

## 2020-12-23 PROCEDURE — U0002 COVID-19 LAB TEST NON-CDC: HCPCS

## 2020-12-23 PROCEDURE — 6370000000 HC RX 637 (ALT 250 FOR IP): Performed by: PSYCHIATRY & NEUROLOGY

## 2020-12-23 PROCEDURE — 6370000000 HC RX 637 (ALT 250 FOR IP): Performed by: NURSE PRACTITIONER

## 2020-12-23 PROCEDURE — 51798 US URINE CAPACITY MEASURE: CPT

## 2020-12-23 PROCEDURE — 6370000000 HC RX 637 (ALT 250 FOR IP): Performed by: INTERNAL MEDICINE

## 2020-12-23 PROCEDURE — 97110 THERAPEUTIC EXERCISES: CPT

## 2020-12-23 PROCEDURE — C9803 HOPD COVID-19 SPEC COLLECT: HCPCS

## 2020-12-23 PROCEDURE — 99233 SBSQ HOSP IP/OBS HIGH 50: CPT | Performed by: NURSE PRACTITIONER

## 2020-12-23 PROCEDURE — 97116 GAIT TRAINING THERAPY: CPT

## 2020-12-23 PROCEDURE — 1240000000 HC EMOTIONAL WELLNESS R&B

## 2020-12-23 RX ADMIN — APIXABAN 5 MG: 5 TABLET, FILM COATED ORAL at 20:43

## 2020-12-23 RX ADMIN — SERTRALINE HYDROCHLORIDE 50 MG: 50 TABLET ORAL at 09:26

## 2020-12-23 RX ADMIN — AMLODIPINE BESYLATE 10 MG: 10 TABLET ORAL at 09:26

## 2020-12-23 RX ADMIN — METOPROLOL TARTRATE 50 MG: 50 TABLET, FILM COATED ORAL at 09:26

## 2020-12-23 RX ADMIN — ASPIRIN 81 MG: 81 TABLET, COATED ORAL at 09:26

## 2020-12-23 RX ADMIN — APIXABAN 5 MG: 5 TABLET, FILM COATED ORAL at 09:26

## 2020-12-23 RX ADMIN — ALOGLIPTIN 12.5 MG: 12.5 TABLET, FILM COATED ORAL at 09:26

## 2020-12-23 RX ADMIN — TRAZODONE HYDROCHLORIDE 50 MG: 50 TABLET ORAL at 20:43

## 2020-12-23 RX ADMIN — ARIPIPRAZOLE 15 MG: 10 TABLET ORAL at 20:42

## 2020-12-23 RX ADMIN — POLYETHYLENE GLYCOL (3350) 17 G: 17 POWDER, FOR SOLUTION ORAL at 09:16

## 2020-12-23 RX ADMIN — METOPROLOL TARTRATE 50 MG: 50 TABLET, FILM COATED ORAL at 20:43

## 2020-12-23 RX ADMIN — LOSARTAN POTASSIUM 25 MG: 25 TABLET ORAL at 09:26

## 2020-12-23 ASSESSMENT — PAIN SCALES - GENERAL
PAINLEVEL_OUTOF10: 0

## 2020-12-23 NOTE — GROUP NOTE
Group Therapy Note    Date: 12/23/2020    Group Start Time: 7072  Group End Time: 6273    Number of Participants: 4/6    Type: Exercise/Recreation Group    Group Topic/Objective: Self-Care Discussion    Notes:  Pt participated actively in the group. Pt reporting finding group beneficial.     Status After Intervention:  Improved    Participation Level:  Active Listener and Interactive    Participation Quality: Appropriate, Attentive and Sharing    Speech:  normal    Thought Process/Content: Logical    Affective Functioning: Congruent    Mood: Bright    Level of consciousness:  Alert and Attentive    Response to Learning: Able to verbalize current knowledge/experience and Able to verbalize/acknowledge new learning    Endings: None Reported    Modes of Intervention: Education, Support and Socialization    Discipline Responsible: Certified Therapeutic Recreation Specialist     Electronically signed by Chuckie Christian MA on 12/24/2020 at 9:38 AM

## 2020-12-23 NOTE — PLAN OF CARE
Problem: Falls - Risk of:  Goal: Will remain free from falls  Description: Will remain free from falls  12/22/2020 2041 by Alec Andrews RN  Outcome: Met This Shift  12/22/2020 1006 by Amy Shaver RN  Outcome: Ongoing  Goal: Absence of physical injury  Description: Absence of physical injury  12/22/2020 2041 by Alec Andrews RN  Outcome: Met This Shift  12/22/2020 1006 by Amy Shaver RN  Outcome: Ongoing

## 2020-12-23 NOTE — BH NOTE
Per request of Mani CHING NP, bladder scanner requested to be brought to the [facility] to monitor patient post-void residual urine. Patient has been experiencing urinary incontinence, and Mani CHIGN NP request his bladder to be scanned at least once per shift, post void and PRN post void. More El Supervisor at 10:20 AM, with request and states she will bring to [facility].

## 2020-12-23 NOTE — PLAN OF CARE
[facility] Interdisciplinary Treatment Plan Note     Review Date & Time: 12/23 0830    Admission Type:   Admission Type: Voluntary    Reason for admission:  Reason for Admission: \"to get my mind right\"    Patient Diagnosis: Major depressive disorder, recurrent episode, severe with psychosis      PATIENT STRENGTHS:  Patient Strengths:Strengths: Motivated, Positive Support  Patient Strengths and Limitations:Limitations: General negative or hopeless attitude about future/recovery  Addictive Behavior:Addictive Behavior  In the past 3 months, have you felt or has someone told you that you have a problem with:  : None  Do you have a history of Chemical Use?: No  Do you have a history of Alcohol Use?: No  Do you have a history of Street Drug Abuse?: No  Histroy of Prescripton Drug Abuse?: No  Medical Problems: History reviewed. No pertinent past medical history.     Risk:  Fall RiskTotal: 72  Bret Scale Bret Scale Score: 19  BVC Total: 0      Status EXAM:   Status and Exam  Normal: Yes  Facial Expression: Brightened  Affect: Appropriate, Stable  Level of Consciousness: Alert  Mood:Normal: Yes  Mood: Anxious  Motor Activity:Normal: Yes  Interview Behavior: Cooperative  Preception: Edwards to Person, Johnita Grebe to Time, Edwards to Place, Edwards to Situation  Attention:Normal: No  Attention: Distractible  Thought Processes: Circumstantial  Thought Content:Normal: Yes  Thought Content: Poverty of Content  Hallucinations: None  Delusions: No  Memory:Normal: No  Memory: Poor Recent  Insight and Judgment: No  Insight and Judgment: Poor Insight  Present Suicidal Ideation: No  Present Homicidal Ideation: No    Daily Assessment Last Entry:   Daily Sleep (WDL): Within Defined Limits  Patient Currently in Pain: No  Daily Nutrition (WDL): Within Defined Limits    Patient Monitoring:  Frequency of Checks: 4 times per hour, close    Psychiatric Symptoms:   Depression Symptoms  Depression Symptoms: No problems reported or observed. Anxiety Symptoms  Anxiety Symptoms: No problems reported or observed. Maria Alejandra Symptoms  Maria Alejandra Symptoms: No problems reported or observed. Psychosis Symptoms  Delusion Type: No problems reported or observed.     Suicide Risk CSSR-S:  1) Within the past month, have you wished you were dead or wished you could go to sleep and not wake up? : No  2) Have you actually had any thoughts of killing yourself? : No  6) Have you ever done anything, started to do anything, or prepared to do anything to end your life?: No          EDUCATION:   Learner Progress Toward Treatment Goals: Reviewed results and recommendations of this team    Method: Group    Outcome: Verbalized understanding    PATIENT GOALS: med titration, return to home    PLAN/TREATMENT RECOMMENDATIONS UPDATE: med titration, participation in unit programming    Estimated Length of Stay Update:  12 days   Estimated Discharge Date Update: 12/28/20  Discharge Criteria: med titration      SHORT-TERM GOALS UPDATE:  Time frame for Short-Term Goals: 10 days     LONG-TERM GOALS UPDATE:  Time frame for Long-Term Goals: 12 days   Members Present in Team Meeting: See Signature Sheet    ISRRAEL Rodgers

## 2020-12-23 NOTE — PROGRESS NOTES
Psychiatric Progress Note    Estela Dick  [id number]  12/23/20    CC: \"I was having headaches, losing my balance, my legs got weak. \"     HPI: Patient noted a fall on 12/11/2020 and came to [facility]. He denied any symptoms of chest pain, SOB prior to this. Patient is poor historian, kept saying though interview  \"I am going to die, what is the point of all these? \" kept saying death is near, would not say why he felt so. He denies any suicidal ideation or homicidal ideation. Patient notes there has been very generalized weakness and fatigue in the past couple of days, denies an fever or chills, no chest pain, palpitations , dizziness, cough or shortness of breath, no nausea, vomiting or urinary symptoms. Patient admitted to [facility]. He is voluntary. Currently he denies SI/HI. He denies AV hallucinations. States he rates his depression as \"2\" on a scale of 0 to 10 with 0 being none and 10 being horrible. He rates his anxiety as \"1\" on the same scale. He states his sleep was \"rough\" last night due to other patients being up on the unit throughout the night; per staff he slept 7 hours. States his appetite is improving. He is oriented x 3. He is bright and reactive on unit. Socializing well with staff. Pt remains in agreement with treatment plan. Pt states he has been noticing that his mouth is dry. Pt was polite and cordial during the interview process. Has been taking medications and has been compliant with treatment. Awaiting results of bladder scan q shift for post void residual to inform decision on whether or not to refer to Urology after discharge. Estela Dick requires a cane due to impaired ambulation, poor balance (specify) and lateral lean. This mobility limitation significantly impairs his/her ability to participate or complete in daily living tasks such as toileting, bathing, dressing, grooming; or any other daily living task around the home.  These issues would be resolved by the use of a cane as Osmarjalil Keturah has demonstrated the ability to use it safely. No Known Allergies    Medications Prior to Admission: traZODone (DESYREL) 50 MG tablet, Take 1 tablet by mouth nightly  losartan (COZAAR) 25 MG tablet, Take 1 tablet by mouth daily  metoprolol tartrate (LOPRESSOR) 50 MG tablet, Take 1 tablet by mouth 2 times daily  amLODIPine (NORVASC) 10 MG tablet, Take 1 tablet by mouth daily  aspirin EC 81 MG EC tablet, Take 1 tablet by mouth daily    History reviewed. No pertinent past medical history. Patient Active Problem List   Diagnosis    Generalized weakness    [facility] depressive [facility]    [facility]    Major depressive disorder, recurrent episode, severe, with psychosis ([facility]       Review of Systems    OBJECTIVE  Vital Signs:  Vitals:    12/23/20 0822   BP: (!) 142/84   Pulse: 71   Resp: 16   Temp: 96.9 °F (36.1 °C)   SpO2: 98%       Labs:  No results found for this or any previous visit (from the past 48 hour(s)). PSYCHIATRIC ASSESSMENT / MENTAL STATUS EXAM:   Vitals: Blood pressure (!) 142/84, pulse 71, temperature 96.9 °F (36.1 °C), temperature source Temporal, resp. rate 16, height 5' 10\" (1.778 m), weight 150 lb (68 kg), SpO2 98 %. CONSTITUTIONAL:    Appearance: Appears stated age. Alert and oriented to person, place, time & situation. No acute distress. Adequate grooming and hygiene. Good eye contact. No prominent physical abnormalities. Attitude: Manner is cooperative and pleasant  Motor: No psychomotor agitation, retardation or abnormal movements noted  Speech: Clearly articulated; normal rate, volume, tone & amount. Language: intact understanding and production  Mood: depressed, anxious  Affect: euthymic, full range, non-labile, congruent with mood and content of speech  Thought Production: Spontaneous. Thought Form: Coherent, linear, logical & goal-directed. No tangentiality or circumstantiality.  No flight of ideas or loosening of associations. Thought Content/Perceptions: No YOSELIN, no AVH, no delusion  Insight: questionable  Judgment: questionable  Memory: Immediate, recent, and remote appear intact, though not formally tested. Attention: maintained throughout interview  Fund of knowledge: Average  Gait/Balance: not assessed    IMPRESSION:   Major depressive disorder, recurrent episode, severe with psychosis  R/o Schizoaffective disorder    PLAN:  Psychiatric management:medication initiation and titration, group and individual therapy, safe and therapeutic environment. Status of problem/condition: Improving  Medical co-morbidities: Management per [facility] group, appreciate assistance  Legal Status: Pending  Disposition:estimated LOS: Pending. The treatment team reviewed with the patient the diagnosis and treatment recommendations to include the risks, benefits, and side effects of chosen medications. The patient verbalized understanding and agreed with the treatment regimen as outlined above. The patient was encouraged to participate in groups. Medical records, Labs, Diagnotic tests reviewed  q15 min safety checks for safety  Interval History  Review current labs  Continue current medications. Start simethicone for bloating/flatulence  Will check post void residual q shift to rule out overflow incontinence  Supportive Therapy Provided  Pt had an opportunity to ask questions and address concerns  Pt encouraged to continue therapy group or individual.  Pt was in agreement with treatment plan. The risks benefits and side effects of medications were discussed with the patient, including alternatives and no treatment. Pt was offered a mask, however pt is not required to wear it, due to decrease communications, poor memory, exacerbation of mental illness, breathing and interaction with staff, additionally reduction in involvement with other pt.   Pt was notified that at anytime they can have a mask if they wish pt was in agreement. The patient was counseled at length about the risks of ryan COVID-19 All of the risks, benefits, and alternatives were discussed.     Electronically signed by DESIREE Zaidi CNP on 12/23/2020 at 9:51 AM

## 2020-12-23 NOTE — GROUP NOTE
Group Therapy Note    Date: 12/23/2020    Group Start Time: 0830  Group End Time: 2627    Number of Participants: 6/6    Type: Morning Goals Group/ Community Meeting    Group Topic/Objective: Set Goal For The Day and to review Unit Rules and Regulations. Patient's Goal:  Pt states his goal is \"Accomplish whatever I have to. \"    Notes:  Pt presented as pleasant and cooperative during group. Pt states he is feeling \"rough\" and is grateful \"that I can make it through another day. \"  Pt reports ~5 hours of restless sleep. Depression (0-10): 0    Anxiety (0-10): 0    Irritability/Aggitation (0-10): 0    Status After Intervention:  Improved    Participation Level:  Active Listener and Interactive    Participation Quality: Appropriate, Attentive and Sharing    Speech:  normal    Thought Process/Content: Logical    Affective Functioning: Congruent    Mood: Bright    Level of consciousness:  Alert and Attentive    Response to Learning: Able to verbalize current knowledge/experience    Endings: None Reported    Modes of Intervention: Education, Support and Socialization    Discipline Responsible: Certified Therapeutic Recreation Specialist     Electronically signed by Mercy Lane MA on 12/23/2020 at 10:18 AM

## 2020-12-24 PROCEDURE — 99232 SBSQ HOSP IP/OBS MODERATE 35: CPT | Performed by: PSYCHIATRY & NEUROLOGY

## 2020-12-24 PROCEDURE — 51798 US URINE CAPACITY MEASURE: CPT

## 2020-12-24 PROCEDURE — 6370000000 HC RX 637 (ALT 250 FOR IP): Performed by: NURSE PRACTITIONER

## 2020-12-24 PROCEDURE — 1240000000 HC EMOTIONAL WELLNESS R&B

## 2020-12-24 PROCEDURE — 6370000000 HC RX 637 (ALT 250 FOR IP): Performed by: PSYCHIATRY & NEUROLOGY

## 2020-12-24 PROCEDURE — 6370000000 HC RX 637 (ALT 250 FOR IP): Performed by: INTERNAL MEDICINE

## 2020-12-24 PROCEDURE — 51701 INSERT BLADDER CATHETER: CPT

## 2020-12-24 RX ORDER — TAMSULOSIN HYDROCHLORIDE 0.4 MG/1
0.4 CAPSULE ORAL EVERY EVENING
Status: DISCONTINUED | OUTPATIENT
Start: 2020-12-24 | End: 2020-12-28 | Stop reason: HOSPADM

## 2020-12-24 RX ADMIN — ALOGLIPTIN 12.5 MG: 12.5 TABLET, FILM COATED ORAL at 08:31

## 2020-12-24 RX ADMIN — AMLODIPINE BESYLATE 10 MG: 10 TABLET ORAL at 08:31

## 2020-12-24 RX ADMIN — APIXABAN 5 MG: 5 TABLET, FILM COATED ORAL at 08:31

## 2020-12-24 RX ADMIN — ASPIRIN 81 MG: 81 TABLET, COATED ORAL at 08:31

## 2020-12-24 RX ADMIN — APIXABAN 5 MG: 5 TABLET, FILM COATED ORAL at 20:48

## 2020-12-24 RX ADMIN — METOPROLOL TARTRATE 50 MG: 50 TABLET, FILM COATED ORAL at 08:31

## 2020-12-24 RX ADMIN — METOPROLOL TARTRATE 50 MG: 50 TABLET, FILM COATED ORAL at 20:48

## 2020-12-24 RX ADMIN — TRAZODONE HYDROCHLORIDE 50 MG: 50 TABLET ORAL at 22:04

## 2020-12-24 RX ADMIN — LOSARTAN POTASSIUM 25 MG: 25 TABLET ORAL at 08:31

## 2020-12-24 RX ADMIN — ARIPIPRAZOLE 15 MG: 10 TABLET ORAL at 20:48

## 2020-12-24 RX ADMIN — TAMSULOSIN HYDROCHLORIDE 0.4 MG: 0.4 CAPSULE ORAL at 11:44

## 2020-12-24 RX ADMIN — SERTRALINE HYDROCHLORIDE 50 MG: 50 TABLET ORAL at 08:31

## 2020-12-24 ASSESSMENT — PAIN SCALES - GENERAL: PAINLEVEL_OUTOF10: 0

## 2020-12-24 NOTE — GROUP NOTE
Group Therapy Note    Date: 12/24/2020    Group Start Time: 0830  Group End Time: 0900    Number of Participants: 5/6    Type: Morning Goals Group/ Community Meeting    Group Topic/Objective: Set Goal For The Day and to review Unit Rules and Regulations. Patient's Goal:  Pt states his goal is \"Get through another day. \"    Notes:  Pt presented as pleasant and cooperative during group. Pt states he is feeling \"restless\" and is grateful Charles Sarah my family is fine. \"  Pt reports restless sleep of ~5 hours. Depression (0-10): 2    Anxiety (0-10): 0    Irritability/Aggitation (0-10): 0    Status After Intervention:  Improved    Participation Level:  Active Listener and Interactive    Participation Quality: Appropriate, Attentive and Sharing    Speech:  normal    Thought Process/Content: Logical    Affective Functioning: Congruent    Mood: Bright    Level of consciousness:  Alert and Attentive    Response to Learning: Able to verbalize current knowledge/experience    Endings: None Reported    Modes of Intervention: Education, Support and Socialization    Discipline Responsible: Certified Therapeutic Recreation Specialist     Electronically signed by Mercy Lane MA on 12/24/2020 at 10:43 AM

## 2020-12-24 NOTE — GROUP NOTE
Group Therapy Note    Date: 12/24/2020    Group Start Time: 1430  Group End Time: 1500  Group Topic: Recreational    [address] [facility] [address]    Christian Griggs, [place], [place]        Group Therapy Note    Attendees: 5/6       Notes:  Pts participated in recreational therapy group; [employer]orating Activity. Pts were encouraged to engage in a leisure activity to promote socialization, positive mood, and coping with negative emotions. Pt participated actively in the group. Pt expressed enjoyment in the activity. Status After Intervention:  Improved    Participation Level:  Active Listener and Interactive    Participation Quality: Appropriate, Attentive and Sharing      Speech:  normal      Thought Process/Content: Logical      Affective Functioning: Congruent      Mood: Bright      Level of consciousness:  Alert and Attentive      Response to Learning: Able to verbalize current knowledge/experience and Able to verbalize/acknowledge new learning      Endings: None Reported    Modes of Intervention: Socialization and Activity      Discipline Responsible: Certified Therapeutic Recreation Specialist     Signature:  Christian Mail, [address], [address] [place] [address]

## 2020-12-24 NOTE — PLAN OF CARE
Problem: Falls - Risk of:  Goal: Will remain free from falls  Description: Will remain free from falls  12/24/2020 0920 by Steve You RN  Outcome: Ongoing  12/24/2020 0036 by Dillon Vines RN  Outcome: Met This Shift  Goal: Absence of physical injury  Description: Absence of physical injury  12/24/2020 0920 by Steve You RN  Outcome: Ongoing  12/24/2020 0036 by Dillon Vnies RN  Outcome: Met This Shift     Problem: Anger Management/Homicidal Ideation:  Goal: Able to display appropriate communication and problem solving  Description: Able to display appropriate communication and problem solving  12/24/2020 0920 by Steve You RN  Outcome: Ongoing  12/24/2020 0036 by Dillon Vines RN  Outcome: Ongoing  Goal: Ability to verbalize frustrations and anger appropriately will improve  Description: Ability to verbalize frustrations and anger appropriately will improve  12/24/2020 0920 by Steve You RN  Outcome: Ongoing  12/24/2020 0036 by Dillon Vines RN  Outcome: Met This Shift  Goal: Absence of angry outbursts  Description: Absence of angry outbursts  12/24/2020 0920 by Steve You RN  Outcome: Ongoing  12/24/2020 0036 by iDllon Vines RN  Outcome: Met This Shift  Goal: Absence of homicidal ideation  Description: Absence of homicidal ideation  12/24/2020 0920 by Steve You RN  Outcome: Ongoing  12/24/2020 0036 by Dillon Vines RN  Outcome: Met This Shift  Goal: Participates in care planning  Description: Participates in care planning  12/24/2020 0920 by Steve You RN  Outcome: Ongoing  12/24/2020 0036 by Dillon Vines RN  Outcome: Ongoing  Goal: Patient specific goal  Description: Patient specific goal  12/24/2020 0920 by Steve You RN  Outcome: Ongoing  12/24/2020 0036 by Dillon Vines RN  Outcome: Ongoing     Problem: Altered Mood, Depressive Behavior:  Goal: Able to verbalize acceptance of life and situations over which he or she has no control  Description: Able to verbalize acceptance of life and situations over which he or she has no control  12/24/2020 0920 by Cassy Vital RN  Outcome: Ongoing  12/24/2020 0036 by Elton Bhatt RN  Outcome: Ongoing  Goal: Able to verbalize and/or display a decrease in depressive symptoms  Description: Able to verbalize and/or display a decrease in depressive symptoms  12/24/2020 0920 by Cassy Vital RN  Outcome: Ongoing  12/24/2020 0036 by Elton Bhatt RN  Outcome: Met This Shift  Goal: Ability to disclose and discuss suicidal ideas will improve  Description: Ability to disclose and discuss suicidal ideas will improve  12/24/2020 0920 by Cassy Vital RN  Outcome: Ongoing  12/24/2020 0036 by Elton Bhatt RN  Outcome: Met This Shift  Goal: Able to verbalize support systems  Description: Able to verbalize support systems  12/24/2020 0920 by Cassy Vital RN  Outcome: Ongoing  12/24/2020 0036 by Elton Bhatt RN  Outcome: Met This Shift  Goal: Absence of self-harm  Description: Absence of self-harm  12/24/2020 0920 by Cassy Vital RN  Outcome: Ongoing  12/24/2020 0036 by Elton Bhatt RN  Outcome: Met This Shift  Goal: Patient specific goal  Description: Patient specific goal  12/24/2020 0920 by Cassy Vital RN  Outcome: Ongoing  12/24/2020 0036 by Elton Bhatt RN  Outcome: Ongoing  Goal: Participates in care planning  Description: Participates in care planning  12/24/2020 0920 by Cassy Vital RN  Outcome: Ongoing  12/24/2020 0036 by Elton Bhatt RN  Outcome: Ongoing     Problem: Depressive Behavior With or Without Suicide Precautions:  Goal: Able to verbalize acceptance of life and situations over which he or she has no control  Description: Able to verbalize acceptance of life and situations over which he or she has no control  12/24/2020 0920 by Cassy Vital RN  Outcome: Ongoing  12/24/2020 0036 by Elton Bhatt RN  Outcome: Ongoing  Goal: Able to verbalize and/or display a decrease in depressive symptoms  Description: Able to verbalize and/or display a decrease in depressive symptoms  12/24/2020 0920 by Neva Pierce RN  Outcome: Ongoing  12/24/2020 0036 by Jaspal Liu RN  Outcome: Met This Shift  Goal: Ability to disclose and discuss suicidal ideas will improve  Description: Ability to disclose and discuss suicidal ideas will improve  12/24/2020 0920 by Neva Pierce RN  Outcome: Ongoing  12/24/2020 0036 by Jaspal Liu RN  Outcome: Met This Shift  Goal: Able to verbalize support systems  Description: Able to verbalize support systems  12/24/2020 0920 by Neva Pierce RN  Outcome: Ongoing  12/24/2020 0036 by Jaspal Liu RN  Outcome: Met This Shift  Goal: Absence of self-harm  Description: Absence of self-harm  12/24/2020 0920 by Neva Pierce RN  Outcome: Ongoing  12/24/2020 0036 by Jaspal Liu RN  Outcome: Met This Shift  Goal: Patient specific goal  Description: Patient specific goal  12/24/2020 0920 by Neva Pierce RN  Outcome: Ongoing  12/24/2020 0036 by Jaspal Liu RN  Outcome: Ongoing  Goal: Participates in care planning  Description: Participates in care planning  12/24/2020 0920 by Neva Pierce RN  Outcome: Ongoing  12/24/2020 0036 by Jaspal Liu RN  Outcome: Ongoing     Problem: Altered Mood, Deterioration in Function:  Goal: Ability to perform activities of daily living will improve  Description: Ability to perform activities of daily living will improve  12/24/2020 0920 by Neva Pierce RN  Outcome: Ongoing  12/24/2020 0036 by Jaspal Liu RN  Outcome: Met This Shift  Goal: Able to verbalize reality based thinking  Description: Able to verbalize reality based thinking  12/24/2020 0920 by Neva Pierce RN  Outcome: Ongoing  12/24/2020 0036 by Jaspal Liu RN  Outcome: Met This Shift  Goal: Skin appearance normal  Description: Skin appearance normal  12/24/2020 0920 by Neva Pierce RN  Outcome: Ongoing  12/24/2020 0036 by Jaspal Liu RN  Outcome: Ongoing  Goal: Maintenance of adequate nutrition will improve  Description: Maintenance of adequate nutrition will improve  12/24/2020 0920 by Pavithra Britton RN  Outcome: Ongoing  12/24/2020 0036 by Nasima Piedra RN  Outcome: Met This Shift  Goal: Ability to tolerate increased activity will improve  Description: Ability to tolerate increased activity will improve  12/24/2020 0920 by Pavithra Britton RN  Outcome: Ongoing  12/24/2020 0036 by Nasima Piedra RN  Outcome: Met This Shift  Goal: Participates in care planning  Description: Participates in care planning  12/24/2020 0920 by Pavithra Britton RN  Outcome: Ongoing  12/24/2020 0036 by Nasima Piedra RN  Outcome: Ongoing  Goal: Patient specific goal  Description: Patient specific goal  12/24/2020 0920 by Pavithra Britton RN  Outcome: Ongoing  12/24/2020 0036 by Nasima Piedra RN  Outcome: Ongoing     Problem: Risk of Harm:  Goal: Ability to remain free from injury will improve  Description: Ability to remain free from injury will improve  12/24/2020 0920 by Pavithra Britton RN  Outcome: Ongoing  12/24/2020 0036 by Nasima Piedra RN  Outcome: Met This Shift     Problem: Altered Mood, Manic Behavior:  Goal: Able to sleep  Description: Able to sleep  12/24/2020 0920 by Pavithra Britton RN  Outcome: Ongoing  12/24/2020 0036 by Nasima Piedra RN  Outcome: Met This Shift  Goal: Able to verbalize decrease in frequency and intensity of racing thoughts  Description: Able to verbalize decrease in frequency and intensity of racing thoughts  12/24/2020 0920 by Pavithra Britton RN  Outcome: Ongoing  12/24/2020 0036 by Nasima Piedra RN  Outcome: Met This Shift  Goal: Ability to disclose and discuss suicidal ideas will improve  Description: Ability to disclose and discuss suicidal ideas will improve  12/24/2020 0920 by Pavithra Britton RN  Outcome: Ongoing  12/24/2020 0036 by Nasima Piedra RN  Outcome: Met This Shift  Goal: Absence of self-harm  Description: Absence of self-harm  12/24/2020 0920 by Alan Stone RN  Outcome: Ongoing  12/24/2020 0036 by Laura Campos RN  Outcome: Met This Shift  Goal: Ability to achieve adequate nutritional intake will improve  Description: Ability to achieve adequate nutritional intake will improve  12/24/2020 0920 by Alan Stone RN  Outcome: Ongoing  12/24/2020 0036 by Laura Campos RN  Outcome: Met This Shift  Goal: Ability to interact with others will improve  Description: Ability to interact with others will improve  12/24/2020 0920 by Alan Stone RN  Outcome: Ongoing  12/24/2020 0036 by Laura Campos RN  Outcome: Met This Shift  Goal: Ability to demonstrate self-control will improve  Description: Ability to demonstrate self-control will improve  12/24/2020 0920 by Alan Stone RN  Outcome: Ongoing  12/24/2020 0036 by Laura Campos RN  Outcome: Met This Shift  Goal: Mood stable  Description: Mood stable  12/24/2020 0920 by Alan Stone RN  Outcome: Ongoing  12/24/2020 0036 by Laura Campos RN  Outcome: Met This Shift  Goal: Patient specific goal  Description: Patient specific goal  12/24/2020 0920 by Alan Stone RN  Outcome: Ongoing  12/24/2020 0036 by Laura Campos RN  Outcome: Ongoing     Problem: Altered Mood, Psychotic Behavior:  Goal: Able to demonstrate trust by eating, participating in treatment and following staff's direction  Description: Able to demonstrate trust by eating, participating in treatment and following staff's direction  12/24/2020 0920 by Alan Stone RN  Outcome: Ongoing  12/24/2020 0036 by Laura Campos RN  Outcome: Met This Shift  Goal: Able to verbalize decrease in frequency and intensity of hallucinations  Description: Able to verbalize decrease in frequency and intensity of hallucinations  12/24/2020 0920 by Alan Stone RN  Outcome: Ongoing  12/24/2020 0036 by Laura Campos RN  Outcome: Met This Shift  Goal: Able to verbalize reality based thinking  Description: Able to verbalize reality based thinking  12/24/2020 0920 by Lemuel Mon RN  Outcome: Ongoing  12/24/2020 0036 by Reny Bettencourt RN  Outcome: Met This Shift  Goal: Absence of self-harm  Description: Absence of self-harm  12/24/2020 0920 by Lemuel Mon RN  Outcome: Ongoing  12/24/2020 0036 by Reny Bettencourt RN  Outcome: Met This Shift  Goal: Ability to achieve adequate nutritional intake will improve  Description: Ability to achieve adequate nutritional intake will improve  12/24/2020 0920 by Lemuel Mon RN  Outcome: Ongoing  12/24/2020 0036 by Reny Bettencourt RN  Outcome: Met This Shift  Goal: Ability to interact with others will improve  Description: Ability to interact with others will improve  12/24/2020 0920 by Lemuel Mon RN  Outcome: Ongoing  12/24/2020 0036 by Reny Bettencourt RN  Outcome: Met This Shift  Goal: Compliance with prescribed medication regimen will improve  Description: Compliance with prescribed medication regimen will improve  12/24/2020 0920 by Lemuel Mon RN  Outcome: Ongoing  12/24/2020 0036 by Reny Bettencourt RN  Outcome: Met This Shift  Goal: Patient specific goal  Description: Patient specific goal  12/24/2020 0920 by Lemuel Mon RN  Outcome: Ongoing  12/24/2020 0036 by Reny Bettencourt RN  Outcome: Ongoing     Problem: Substance Abuse:  Goal: Absence of drug withdrawal signs and symptoms  Description: Absence of drug withdrawal signs and symptoms  12/24/2020 0920 by Lemuel Mon RN  Outcome: Ongoing  12/24/2020 0036 by Reny Bettencourt RN  Outcome: Met This Shift  Goal: Participates in care planning  Description: Participates in care planning  12/24/2020 0920 by Lemuel Mon RN  Outcome: Ongoing  12/24/2020 0036 by Reny Bettencourt RN  Outcome: Ongoing  Goal: Patient specific goal  Description: Patient specific goal  12/24/2020 0920 by Lemuel Mon RN  Outcome: Ongoing  12/24/2020 0036 by Reny Bettencourt RN  Outcome: Ongoing

## 2020-12-24 NOTE — BH NOTE
Updated on post void residual by bedside RN Ayo Gama. Bladder scan revealed 999 mL. Will order straight cath every 8 hours with coude catheter as strongly suspect patient has BPH. If > 250 mL post void residual x 2 will place garcia catheter. Will refer to Urology.

## 2020-12-24 NOTE — GROUP NOTE
Group Therapy Note    Date: 12/24/2020    Group Start Time: 1020  Group End Time: 7820    Number of Participants: 3/6    Type: Spirituality    Group Topic/Objective: Religous discussion with [employer]. Notes:  Pt taking a shower at time of group.      Discipline Responsible: Certified Therapeutic Recreation Specialist     Electronically signed by Mónica Markham [place], [address] on 12/24/2020 at 11:10 AM

## 2020-12-24 NOTE — PLAN OF CARE
Problem: Falls - Risk of:  Goal: Will remain free from falls  Description: Will remain free from falls  12/24/2020 0036 by Gaudencio Yao RN  Outcome: Met This Shift  12/23/2020 1520 by Gato Sue RN  Outcome: Ongoing  Goal: Absence of physical injury  Description: Absence of physical injury  12/24/2020 0036 by Gaudencio Yao RN  Outcome: Met This Shift  12/23/2020 1520 by Gato Sue RN  Outcome: Ongoing

## 2020-12-24 NOTE — BH NOTE
Jaylen Olivarez was visible on the milieu. He engages with male peers. Can be seen smiling, laughing and joking with peers. He is cooperative with vitals. Medication reviewed, he voiced understanding and was compliant. Jaylen Olivarez made multiple trips to the bathroom. A bladder scan was ordered. Bladder scan completed. Residual urine was 999 mL. New orders obtained from Manan Leos., NP to straight cath pt. Pt's affect was bright. He endorsed a little depression and denied all other symptoms. He has been cooperative and polite. He is future oriented. Noted a little forgetful this shift, telling the same story a couple of times. Will continue to monitor for safety.

## 2020-12-24 NOTE — PROGRESS NOTES
Psychiatric Progress Note    Fany Zarate  [id number]  12/24/20    CC: \"I was having headaches, losing my balance, my legs got weak. \"     HPI: Patient noted a fall on 12/11/2020 and came to [facility]. He denied any symptoms of chest pain, SOB prior to this. Patient noted that he believed his brother was murdered in his apartment, because of this the pt was not taking care of himself via not bathing or caring for self due to fear of her self. Pt continues to remain display paranoid delusion. With poor ability to care for himself. Currently he denies SI/HI. He denies AV hallucinations however continues to display paranoid delusions. States he rates his depression as \"2\" on a scale of 0 to 10 with 0 being none and 10 being horrible. He rates his anxiety as \"1\" on the same scale. He states his sleep was \"rough\" last night due to other patients being up on the unit throughout the night; per staff he slept 7 hours. States his appetite is improving. He is oriented x 3. He is bright and reactive on unit. Socializing well with staff. Pt remains in agreement with treatment plan. Pt was polite and cordial during the interview process. Has been taking medications and has been compliant with treatment. Awaiting results of bladder scan q shift for post void residual to inform decision on whether or not to refer to Urology after discharge, pt started on flomax. Fany Zarate requires a cane due to impaired ambulation, poor balance (specify) and lateral lean. This mobility limitation significantly impairs his/her ability to participate or complete in daily living tasks such as toileting, bathing, dressing, grooming; or any other daily living task around the home. These issues would be resolved by the use of a cane as Fany Zarate has demonstrated the ability to use it safely.     No Known Allergies    Medications Prior to Admission: traZODone (DESYREL) 50 MG tablet, Take 1 tablet by mouth formally tested. Attention: maintained throughout interview  Fund of knowledge: Average  Gait/Balance: not assessed    IMPRESSION:   Major depressive disorder, recurrent episode, severe with psychosis  R/o Schizoaffective disorder    PLAN:  Psychiatric management:medication initiation and titration, group and individual therapy, safe and therapeutic environment. Status of problem/condition: Improving  Medical co-morbidities: Management per [facility] group, appreciate assistance  Legal Status: Pending  Disposition:estimated LOS: Pending. The treatment team reviewed with the patient the diagnosis and treatment recommendations to include the risks, benefits, and side effects of chosen medications. The patient verbalized understanding and agreed with the treatment regimen as outlined above. The patient was encouraged to participate in groups. Medical records, Labs, Diagnotic tests reviewed  q15 min safety checks for safety  Interval History  Review current labs  Continue current medications. Start simethicone for bloating/flatulence  Will check post void residual q shift to rule out overflow incontinence  Supportive Therapy Provided  Pt had an opportunity to ask questions and address concerns  Pt encouraged to continue therapy group or individual.  Pt was in agreement with treatment plan. The risks benefits and side effects of medications were discussed with the patient, including alternatives and no treatment. Pt was offered a mask, however pt is not required to wear it, due to decrease communications, poor memory, exacerbation of mental illness, breathing and interaction with staff, additionally reduction in involvement with other pt. Pt was notified that at anytime they can have a mask if they wish pt was in agreement. The patient was counseled at length about the risks of ryan COVID-19 All of the risks, benefits, and alternatives were discussed.     Electronically signed by Giorgi Clinton DO on 12/24/2020 at 9:48 AM

## 2020-12-25 LAB
SARS-COV-2: NOT DETECTED
SOURCE: NORMAL

## 2020-12-25 PROCEDURE — 1240000000 HC EMOTIONAL WELLNESS R&B

## 2020-12-25 PROCEDURE — 6370000000 HC RX 637 (ALT 250 FOR IP): Performed by: INTERNAL MEDICINE

## 2020-12-25 PROCEDURE — 81001 URINALYSIS AUTO W/SCOPE: CPT

## 2020-12-25 PROCEDURE — 6370000000 HC RX 637 (ALT 250 FOR IP): Performed by: NURSE PRACTITIONER

## 2020-12-25 PROCEDURE — 6370000000 HC RX 637 (ALT 250 FOR IP): Performed by: PSYCHIATRY & NEUROLOGY

## 2020-12-25 PROCEDURE — 51701 INSERT BLADDER CATHETER: CPT

## 2020-12-25 PROCEDURE — 99232 SBSQ HOSP IP/OBS MODERATE 35: CPT | Performed by: PSYCHIATRY & NEUROLOGY

## 2020-12-25 PROCEDURE — 51798 US URINE CAPACITY MEASURE: CPT

## 2020-12-25 RX ADMIN — METOPROLOL TARTRATE 50 MG: 50 TABLET, FILM COATED ORAL at 08:15

## 2020-12-25 RX ADMIN — TRAZODONE HYDROCHLORIDE 50 MG: 50 TABLET ORAL at 21:40

## 2020-12-25 RX ADMIN — ARIPIPRAZOLE 15 MG: 10 TABLET ORAL at 21:39

## 2020-12-25 RX ADMIN — APIXABAN 5 MG: 5 TABLET, FILM COATED ORAL at 21:40

## 2020-12-25 RX ADMIN — METOPROLOL TARTRATE 50 MG: 50 TABLET, FILM COATED ORAL at 21:40

## 2020-12-25 RX ADMIN — TAMSULOSIN HYDROCHLORIDE 0.4 MG: 0.4 CAPSULE ORAL at 18:54

## 2020-12-25 RX ADMIN — ALOGLIPTIN 12.5 MG: 12.5 TABLET, FILM COATED ORAL at 08:16

## 2020-12-25 RX ADMIN — ASPIRIN 81 MG: 81 TABLET, COATED ORAL at 08:15

## 2020-12-25 RX ADMIN — POLYETHYLENE GLYCOL (3350) 17 G: 17 POWDER, FOR SOLUTION ORAL at 09:00

## 2020-12-25 RX ADMIN — APIXABAN 5 MG: 5 TABLET, FILM COATED ORAL at 08:15

## 2020-12-25 RX ADMIN — SERTRALINE HYDROCHLORIDE 50 MG: 50 TABLET ORAL at 08:16

## 2020-12-25 RX ADMIN — SIMETHICONE 80 MG: 80 TABLET, CHEWABLE ORAL at 09:00

## 2020-12-25 RX ADMIN — LOSARTAN POTASSIUM 25 MG: 25 TABLET ORAL at 08:16

## 2020-12-25 RX ADMIN — AMLODIPINE BESYLATE 10 MG: 10 TABLET ORAL at 08:15

## 2020-12-25 ASSESSMENT — PAIN SCALES - GENERAL
PAINLEVEL_OUTOF10: 0
PAINLEVEL_OUTOF10: 0

## 2020-12-25 NOTE — PLAN OF CARE
Problem: Falls - Risk of:  Goal: Will remain free from falls  Description: Will remain free from falls  12/24/2020 2110 by Ambrocio Chen RN  Outcome: Ongoing  12/24/2020 0920 by Jimmie Marc RN  Outcome: Ongoing  Goal: Absence of physical injury  Description: Absence of physical injury  12/24/2020 2110 by Ambrocio Chen RN  Outcome: Ongoing  12/24/2020 0920 by Jimmie Marc RN  Outcome: Ongoing     Problem: Anger Management/Homicidal Ideation:  Goal: Able to display appropriate communication and problem solving  Description: Able to display appropriate communication and problem solving  12/24/2020 2110 by Ambrocio Chen RN  Outcome: Ongoing  12/24/2020 0920 by Jimmie Marc RN  Outcome: Ongoing  Goal: Ability to verbalize frustrations and anger appropriately will improve  Description: Ability to verbalize frustrations and anger appropriately will improve  12/24/2020 2110 by Ambrocio Chen RN  Outcome: Ongoing  12/24/2020 0920 by Jimmie Marc RN  Outcome: Ongoing  Goal: Absence of angry outbursts  Description: Absence of angry outbursts  12/24/2020 2110 by Ambrocio Chen RN  Outcome: Ongoing  12/24/2020 0920 by Jimmie Marc RN  Outcome: Ongoing  Goal: Absence of homicidal ideation  Description: Absence of homicidal ideation  12/24/2020 2110 by Ambrocio Chen RN  Outcome: Ongoing  12/24/2020 0920 by Jimmie Marc RN  Outcome: Ongoing  Goal: Participates in care planning  Description: Participates in care planning  12/24/2020 2110 by Ambrocio Chen RN  Outcome: Ongoing  12/24/2020 0920 by Jimmie Marc RN  Outcome: Ongoing  Goal: Patient specific goal  Description: Patient specific goal  12/24/2020 2110 by Ambrocio Chen RN  Outcome: Ongoing  12/24/2020 0920 by Jimmie Marc RN  Outcome: Ongoing     Problem: Altered Mood, Depressive Behavior:  Goal: Able to verbalize acceptance of life and situations over which he or she has no control  Description: Able to verbalize acceptance of life and situations over improve  Description: Ability to achieve adequate nutritional intake will improve  12/24/2020 2110 by Javed Vargas RN  Outcome: Ongoing  12/24/2020 0920 by Micheal Choi RN  Outcome: Ongoing  Goal: Ability to interact with others will improve  Description: Ability to interact with others will improve  12/24/2020 2110 by Javed Vargas RN  Outcome: Ongoing  12/24/2020 0920 by Micheal Choi RN  Outcome: Ongoing  Goal: Ability to demonstrate self-control will improve  Description: Ability to demonstrate self-control will improve  12/24/2020 2110 by Javed Vargas RN  Outcome: Ongoing  12/24/2020 0920 by Micheal Choi RN  Outcome: Ongoing  Goal: Mood stable  Description: Mood stable  12/24/2020 2110 by Javed Vargas RN  Outcome: Ongoing  12/24/2020 0920 by Micheal Choi RN  Outcome: Ongoing  Goal: Patient specific goal  Description: Patient specific goal  12/24/2020 2110 by Javed Vargas RN  Outcome: Ongoing  12/24/2020 0920 by Micheal Choi RN  Outcome: Ongoing     Problem: Altered Mood, Psychotic Behavior:  Goal: Able to demonstrate trust by eating, participating in treatment and following staff's direction  Description: Able to demonstrate trust by eating, participating in treatment and following staff's direction  12/24/2020 2110 by Javed Vargas RN  Outcome: Ongoing  12/24/2020 0920 by Micheal Choi RN  Outcome: Ongoing  Goal: Able to verbalize decrease in frequency and intensity of hallucinations  Description: Able to verbalize decrease in frequency and intensity of hallucinations  12/24/2020 2110 by Javed Vargas RN  Outcome: Ongoing  12/24/2020 0920 by Micheal Choi RN  Outcome: Ongoing  Goal: Able to verbalize reality based thinking  Description: Able to verbalize reality based thinking  12/24/2020 2110 by Javed Vargas RN  Outcome: Ongoing  12/24/2020 0920 by Micheal Choi RN  Outcome: Ongoing  Goal: Absence of self-harm  Description: Absence of self-harm  12/24/2020 2110 by Javed Vargas

## 2020-12-25 NOTE — PROGRESS NOTES
Hospitalist Progress Note      Name:  Dennis Buck /Age/Sex: 1950  (65 y.o. male)   MRN & CSN:  [id number] & [id number] Admission Date/Time: 2020  5:33 PM   Location:  [facility]1046-01 PCP: No primary care provider on file. Hospital Day: 10    Assessment and Plan:   Dennis Buck is a 65 y.o.  male  who presents with Major depressive disorder, recurrent episode, severe, with psychosis ([facility], [address]    1. Urinary retention: Patient had 1300 mL circa 2 days ago on bladder scan initially straight cath repeat retaining currently with Corcoran catheter. Nursing noted some pinkish tinge in bladder bag today. UA and microscopy for same pending provided tamsulosin 0.4 mg in the evening. Instructions to remove catheter for bladder challenge if unable to void replace Corcoran patient will need urology outpatient follow-up. 2. PAF: New onset previous hospital admission. Rate controlled continue metoprolol tartrate 50 mg twice daily. Cardiology evaluated patient due to fall risks no OAC. 3.  Hypertension: Elevated this a.m. prior to medications. Otherwise controlled continue amlodipine 10 mg daily, losartan 25 mg daily and metoprolol tartrate 50 mg twice daily    4. Fall risk: Patient working with PT/OT needing cane for same. At this time await for UA with microscopy bladder challenge as above. Continue to monitor      Diet DIET GENERAL; No Added Salt (3-4 GM); Dental Soft   DVT Prophylaxis [] Lovenox, []  Heparin, [] SCDs, []No VTE prophylaxis, patient ambulating   GI Prophylaxis [] PPI, [] H2 Blocker, [] No GI prophylaxis, patient is receiving diet/Tube Feeds   Code Status Full Code   Disposition Patient requires continued admission due to an SBU   MDM [] Low, [x] Moderate,[]  High  Patient's risk as above due to as above     History of Present Illness:     Pt S&E. No acute events today. Patient sitting upright resting comfortably in day room. Denies any headache blurred vision dizziness. Denies any chest pain palpitations or shortness of breath. Denies any fever chills nausea or vomiting. Corcoran catheter still in place. Denies any abdominal pain joint pain or muscle pain. 10-14 point ROS reviewed negative, unless as noted above    Objective: Intake/Output Summary (Last 24 hours) at 12/25/2020 1440  Last data filed at 12/25/2020 0330  Gross per 24 hour   Intake --   Output 1210 ml   Net -1210 ml      Vitals:   Vitals:    12/25/20 0815   BP: (!) 149/86   Pulse: 63   Resp:    Temp:    SpO2:      Physical Exam:    GEN Awake male, sitting upright in chair in no apparent distress. Appears given age. EYES Pupils are equally round. No scleral erythema, discharge, or conjunctivitis. HENT Mucous membranes are moist.   NECK No apparent thyromegaly or masses. RESP Clear to auscultation, no wheezes, rales or rhonchi. Symmetric chest movement while on room air. CARDIO/VASC S1/S2 auscultated. Regular rate without appreciable murmurs, rubs, or gallops. Peripheral pulses equal bilaterally and palpable. No peripheral edema. GI Abdomen is soft without significant tenderness, masses, or guarding. Bowel sounds are normoactive. Rectal exam deferred.  Corcoran catheter is present secured functioning. HEME/LYMPH No petechiae or ecchymoses. MSK No gross joint deformities. Spontaneous movement of all extremities  SKIN Normal coloration, warm, dry. NEURO Cranial nerves appear grossly intact, normal speech, no lateralizing weakness. PSYCH Awake, alert, oriented x 4. Affect appropriate.     Medications:   Medications:    tamsulosin  0.4 mg Oral QPM    ARIPiprazole  15 mg Oral Nightly    alogliptin  12.5 mg Oral Daily    apixaban  5 mg Oral BID    amLODIPine  10 mg Oral Daily    aspirin  81 mg Oral Daily    losartan  25 mg Oral Daily    metoprolol tartrate  50 mg Oral BID    traZODone  50 mg Oral Nightly    sertraline  50 mg Oral Daily      Infusions:   PRN Meds:     simethicone, 80 mg, Q6H PRN      polyethylene glycol, 17 g, Daily PRN      acetaminophen, 325 mg, Q4H PRN      acetaminophen, 500 mg, Q4H PRN      acetaminophen, 650 mg, Q4H PRN      LORazepam, 1 mg, Q4H PRN    Or      LORazepam, 1 mg, Q4H PRN      haloperidol lactate, 5 mg, Q4H PRN    Or      haloperidol, 5 mg, Q4H PRN      diphenhydrAMINE, 25 mg, Q4H PRN          Electronically signed by Miranda Limon MD on 12/25/2020 at 2:40 PM

## 2020-12-25 NOTE — PLAN OF CARE
Ongoing     Problem: Depressive Behavior With or Without Suicide Precautions:  Goal: Able to verbalize acceptance of life and situations over which he or she has no control  Description: Able to verbalize acceptance of life and situations over which he or she has no control  Outcome: Ongoing  Goal: Able to verbalize and/or display a decrease in depressive symptoms  Description: Able to verbalize and/or display a decrease in depressive symptoms  Outcome: Ongoing  Goal: Ability to disclose and discuss suicidal ideas will improve  Description: Ability to disclose and discuss suicidal ideas will improve  Outcome: Ongoing  Goal: Able to verbalize support systems  Description: Able to verbalize support systems  Outcome: Ongoing  Goal: Absence of self-harm  Description: Absence of self-harm  Outcome: Ongoing  Goal: Patient specific goal  Description: Patient specific goal  Outcome: Ongoing  Goal: Participates in care planning  Description: Participates in care planning  Outcome: Ongoing     Problem: Altered Mood, Deterioration in Function:  Goal: Ability to perform activities of daily living will improve  Description: Ability to perform activities of daily living will improve  Outcome: Ongoing  Goal: Able to verbalize reality based thinking  Description: Able to verbalize reality based thinking  Outcome: Ongoing  Goal: Skin appearance normal  Description: Skin appearance normal  Outcome: Ongoing  Goal: Maintenance of adequate nutrition will improve  Description: Maintenance of adequate nutrition will improve  Outcome: Ongoing  Goal: Ability to tolerate increased activity will improve  Description: Ability to tolerate increased activity will improve  Outcome: Ongoing  Goal: Participates in care planning  Description: Participates in care planning  Outcome: Ongoing  Goal: Patient specific goal  Description: Patient specific goal  Outcome: Ongoing     Problem: Risk of Harm:  Goal: Ability to remain free from injury will improve  Description: Ability to remain free from injury will improve  Outcome: Ongoing     Problem: Altered Mood, Manic Behavior:  Goal: Able to sleep  Description: Able to sleep  Outcome: Ongoing  Goal: Able to verbalize decrease in frequency and intensity of racing thoughts  Description: Able to verbalize decrease in frequency and intensity of racing thoughts  Outcome: Ongoing  Goal: Ability to disclose and discuss suicidal ideas will improve  Description: Ability to disclose and discuss suicidal ideas will improve  Outcome: Ongoing  Goal: Absence of self-harm  Description: Absence of self-harm  Outcome: Ongoing  Goal: Ability to achieve adequate nutritional intake will improve  Description: Ability to achieve adequate nutritional intake will improve  Outcome: Ongoing  Goal: Ability to interact with others will improve  Description: Ability to interact with others will improve  Outcome: Ongoing  Goal: Ability to demonstrate self-control will improve  Description: Ability to demonstrate self-control will improve  Outcome: Ongoing  Goal: Mood stable  Description: Mood stable  Outcome: Ongoing  Goal: Patient specific goal  Description: Patient specific goal  Outcome: Ongoing

## 2020-12-25 NOTE — PROGRESS NOTES
Psychiatric Progress Note    Fany Zarate  [id number]  12/25/20    CC: \"I was having headaches, losing my balance, my legs got weak. \"     HPI: Patient noted a fall on 12/11/2020 and came to [facility]. He denied any symptoms of chest pain, SOB prior to this. Patient noted that he believed his brother was murdered in his apartment, because of this the pt was not taking care of himself via not bathing or caring for self due to fear of her self. Pt continues to remain display paranoid delusion. With poor ability to care for himself. Currently he denies SI/HI. He denies AV hallucinations however continues to display paranoid delusions. States he rates his depression as \"2\" on a scale of 0 to 10 with 0 being none and 10 being horrible. He rates his anxiety as \"1\" on the same scale. He states his sleep was \"okay,\" last night due to other patients being up on the unit throughout the night; per staff he slept 7 hours. States his appetite is improving. He is oriented x 3. He is bright and reactive on unit. Socializing well with staff. Pt remains in agreement with treatment plan. Pt was polite and cordial during the interview process. Has been taking medications and has been compliant with treatment. Awaiting results of bladder scan q shift for post void residual to inform decision on whether or not to refer to Urology after discharge, pt started on flomax. Fany Zarate requires a cane due to impaired ambulation, poor balance (specify) and lateral lean. This mobility limitation significantly impairs his/her ability to participate or complete in daily living tasks such as toileting, bathing, dressing, grooming; or any other daily living task around the home. These issues would be resolved by the use of a cane as Fany Zarate has demonstrated the ability to use it safely. Pt continues to have urinary issues with his bladder, twice have 1300 ml requring scan and straight cath.     No questionable  Judgment: questionable  Memory: Immediate, recent, and remote appear intact, though not formally tested. Attention: maintained throughout interview  Fund of knowledge: Average  Gait/Balance: not assessed    IMPRESSION:   Major depressive disorder, recurrent episode, severe with psychosis  R/o Schizoaffective disorder    PLAN:  Psychiatric management:medication initiation and titration, group and individual therapy, safe and therapeutic environment. Status of problem/condition: Improving  Medical co-morbidities: Management per [facility] group, appreciate assistance  Legal Status: Pending  Disposition:estimated LOS: Pending. The treatment team reviewed with the patient the diagnosis and treatment recommendations to include the risks, benefits, and side effects of chosen medications. The patient verbalized understanding and agreed with the treatment regimen as outlined above. The patient was encouraged to participate in groups. Medical records, Labs, Diagnotic tests reviewed  q15 min safety checks for safety  Interval History  Review current labs  Continue current medications. Start simethicone for bloating/flatulence  Will check post void residual q shift to rule out overflow incontinence  Supportive Therapy Provided  Pt had an opportunity to ask questions and address concerns  Pt encouraged to continue therapy group or individual.  Pt was in agreement with treatment plan. The risks benefits and side effects of medications were discussed with the patient, including alternatives and no treatment. Pt was offered a mask, however pt is not required to wear it, due to decrease communications, poor memory, exacerbation of mental illness, breathing and interaction with staff, additionally reduction in involvement with other pt. Pt was notified that at anytime they can have a mask if they wish pt was in agreement. The patient was counseled at length about the risks of ryan COVID-19 All of the risks, benefits, and alternatives were discussed.     Electronically signed by Jesus Manuel Musa DO on 12/25/2020 at 2:02 PM

## 2020-12-26 LAB
BACTERIA: ABNORMAL /HPF
BILIRUBIN URINE: NEGATIVE MG/DL
BLOOD, URINE: ABNORMAL
CAST TYPE: ABNORMAL /HPF
CLARITY: CLEAR
COLOR: YELLOW
CRYSTAL TYPE: ABNORMAL /HPF
EPITHELIAL CELLS, UA: ABNORMAL /HPF
GLUCOSE, URINE: 100 MG/DL
KETONES, URINE: NEGATIVE MG/DL
LEUKOCYTE ESTERASE, URINE: ABNORMAL
MUCUS: ABNORMAL HPF
NITRITE URINE, QUANTITATIVE: NEGATIVE
PH, URINE: 5 (ref 5–8)
PROTEIN UA: NEGATIVE MG/DL
RBC URINE: ABNORMAL /HPF (ref 0–3)
SPECIFIC GRAVITY UA: <1.005 (ref 1–1.03)
UROBILINOGEN, URINE: 0.2 MG/DL (ref 0.2–1)
VOLUME, (UVOL): 12 ML (ref 10–12)
WBC UA: ABNORMAL /HPF (ref 0–2)

## 2020-12-26 PROCEDURE — 6370000000 HC RX 637 (ALT 250 FOR IP): Performed by: PSYCHIATRY & NEUROLOGY

## 2020-12-26 PROCEDURE — 6370000000 HC RX 637 (ALT 250 FOR IP): Performed by: INTERNAL MEDICINE

## 2020-12-26 PROCEDURE — 6370000000 HC RX 637 (ALT 250 FOR IP): Performed by: NURSE PRACTITIONER

## 2020-12-26 PROCEDURE — 1240000000 HC EMOTIONAL WELLNESS R&B

## 2020-12-26 PROCEDURE — 99232 SBSQ HOSP IP/OBS MODERATE 35: CPT | Performed by: PSYCHIATRY & NEUROLOGY

## 2020-12-26 RX ADMIN — METOPROLOL TARTRATE 50 MG: 50 TABLET, FILM COATED ORAL at 21:45

## 2020-12-26 RX ADMIN — APIXABAN 5 MG: 5 TABLET, FILM COATED ORAL at 21:44

## 2020-12-26 RX ADMIN — TRAZODONE HYDROCHLORIDE 50 MG: 50 TABLET ORAL at 21:45

## 2020-12-26 RX ADMIN — ARIPIPRAZOLE 15 MG: 10 TABLET ORAL at 21:44

## 2020-12-26 RX ADMIN — ALOGLIPTIN 12.5 MG: 12.5 TABLET, FILM COATED ORAL at 08:22

## 2020-12-26 RX ADMIN — AMLODIPINE BESYLATE 10 MG: 10 TABLET ORAL at 08:22

## 2020-12-26 RX ADMIN — TAMSULOSIN HYDROCHLORIDE 0.4 MG: 0.4 CAPSULE ORAL at 17:00

## 2020-12-26 RX ADMIN — SERTRALINE HYDROCHLORIDE 50 MG: 50 TABLET ORAL at 08:22

## 2020-12-26 RX ADMIN — METOPROLOL TARTRATE 50 MG: 50 TABLET, FILM COATED ORAL at 08:22

## 2020-12-26 RX ADMIN — APIXABAN 5 MG: 5 TABLET, FILM COATED ORAL at 08:22

## 2020-12-26 RX ADMIN — ASPIRIN 81 MG: 81 TABLET, COATED ORAL at 08:22

## 2020-12-26 RX ADMIN — LOSARTAN POTASSIUM 25 MG: 25 TABLET ORAL at 08:22

## 2020-12-26 ASSESSMENT — PAIN SCALES - GENERAL
PAINLEVEL_OUTOF10: 0
PAINLEVEL_OUTOF10: 0

## 2020-12-26 NOTE — PROGRESS NOTES
Psychiatric Progress Note    Jez Virgen  [id number]  12/26/20    CC: \"I was having headaches, losing my balance, my legs got weak. \"     HPI: Patient noted a fall on 12/11/2020 and came to [facility]. He denied any symptoms of chest pain, SOB prior to this. Patient noted that he believed his brother was murdered in his apartment, because of this the pt was not taking care of himself via not bathing or caring for self due to fear of her self. Pt continues to remain display paranoid delusion. With poor ability to care for himself. Currently he denies SI/HI. He denies AV hallucinations however continues to display paranoid delusions. States he rates his depression as \"2\" on a scale of 0 to 10 with 0 being none and 10 being horrible. He rates his anxiety as \"1\" on the same scale. He states his sleep was \"okay,\" last night due to other patients being up on the unit throughout the night; per staff he slept 7 hours. States his appetite is improving. He is oriented x 3. He is bright and reactive on unit. Socializing well with staff. Pt remains in agreement with treatment plan. Pt was polite and cordial during the interview process. Has been taking medications and has been compliant with treatment. Awaiting results of bladder scan q shift for post void residual to inform decision on whether or not to refer to Urology after discharge, pt started on flomax. Jez Virgen requires a cane due to impaired ambulation, poor balance (specify) and lateral lean. This mobility limitation significantly impairs his/her ability to participate or complete in daily living tasks such as toileting, bathing, dressing, grooming; or any other daily living task around the home. These issues would be resolved by the use of a cane as Jez Virgen has demonstrated the ability to use it safely. Pt continues to have urinary issues with his bladder, twice have 1300 ml requring scan and straight cath.     No Known Allergies    Medications Prior to Admission: traZODone (DESYREL) 50 MG tablet, Take 1 tablet by mouth nightly  losartan (COZAAR) 25 MG tablet, Take 1 tablet by mouth daily  metoprolol tartrate (LOPRESSOR) 50 MG tablet, Take 1 tablet by mouth 2 times daily  amLODIPine (NORVASC) 10 MG tablet, Take 1 tablet by mouth daily  aspirin EC 81 MG EC tablet, Take 1 tablet by mouth daily    History reviewed. No pertinent past medical history. Patient Active Problem List   Diagnosis    Generalized weakness    Recurrent major depressive disorder ([facility]    Atrial fibrillation with RVR ([facility]    Major depressive disorder, recurrent episode, severe, with psychosis ([facility]    Impaired mobility    Poor balance       Review of Systems    OBJECTIVE  Vital Signs:  Vitals:    12/26/20 0830   BP: 137/67   Pulse: 63   Resp: 18   Temp: 97.4 °F (36.3 °C)   SpO2: 98%       Labs:  No results found for this or any previous visit (from the past 48 hour(s)). PSYCHIATRIC ASSESSMENT / MENTAL STATUS EXAM:   Vitals: Blood pressure 137/67, pulse 63, temperature 97.4 °F (36.3 °C), temperature source Temporal, resp. rate 18, height 5' 10\" (1.778 m), weight 150 lb (68 kg), SpO2 98 %. CONSTITUTIONAL:    Appearance: Appears stated age. Alert and oriented to person, place. No acute distress. inappropriate grooming and hygiene upon admission. . No prominent physical abnormalities. Attitude: Manner is cooperative and pleasant  Motor: No psychomotor agitation, retardation or abnormal movements noted  Speech: Clearly articulated; normal rate, volume, tone & amount. Language: intact understanding and production  Mood: depressed, anxious  Affect: euthymic, full range, non-labile, congruent with mood and content of speech  Thought Production: Spontaneous. Thought Form: Coherent, linear, at times with tangentiality and circumstantiality with loosening of associations.   Thought Content/Perceptions: No YOSELIN, noted hx of AVH, noted parnoid delusion  Insight: questionable  Judgment: questionable  Memory: Immediate, recent, and remote appear intact, though not formally tested. Attention: maintained throughout interview  Fund of knowledge: Average  Gait/Balance: not assessed    IMPRESSION:   Major depressive disorder, recurrent episode, severe with psychosis  R/o Schizoaffective disorder    PLAN:  Psychiatric management:medication initiation and titration, group and individual therapy, safe and therapeutic environment. Status of problem/condition: Improving  Medical co-morbidities: Management per [facility] group, appreciate assistance  Legal Status: Pending  Disposition:estimated LOS: Pending. The treatment team reviewed with the patient the diagnosis and treatment recommendations to include the risks, benefits, and side effects of chosen medications. The patient verbalized understanding and agreed with the treatment regimen as outlined above. The patient was encouraged to participate in groups. Medical records, Labs, Diagnotic tests reviewed  q15 min safety checks for safety  Interval History  Review current labs  Continue current medications. Start simethicone for bloating/flatulence  Will check post void residual q shift to rule out overflow incontinence  Supportive Therapy Provided  Pt had an opportunity to ask questions and address concerns  Pt encouraged to continue therapy group or individual.  Pt was in agreement with treatment plan. The risks benefits and side effects of medications were discussed with the patient, including alternatives and no treatment. Pt was offered a mask, however pt is not required to wear it, due to decrease communications, poor memory, exacerbation of mental illness, breathing and interaction with staff, additionally reduction in involvement with other pt. Pt was notified that at anytime they can have a mask if they wish pt was in agreement. The patient was counseled at length about the risks of ryan COVID-19 All of the risks, benefits, and alternatives were discussed.     Electronically signed by Concha Buckner DO on 12/26/2020 at 9:20 AM

## 2020-12-26 NOTE — BH NOTE
Patient indwelling cath removed this AM, with the (balloon intact), per order of Dr. Carole Mckeon for a bladder challenge. Unsuccessful, in that the patient was able to go the entire shift without voiding all day. Bladder scan this evening resulting in 908 mL. Per Dr. Carole Mckeon, a new cath was placed with a leg bag.  1300 mL output upon placement. Patient did complain of discomfort and a small amount of fresh blood noted at time of placement. Patient stated currently comfortable with the placement.

## 2020-12-26 NOTE — PROGRESS NOTES
Assessment completed. Pt is alert and oriented to person, place, time and situation. Pt reports adequate sleep. Pt rates depression a 0 out of 10 with ten being the worst and anxiety a 0 out of ten on the same scale. Pt denies auditory or visual hallucinations. Pt denies delusions. Pt denies SI. Pt denies HI. Pt eager to go home Monday. 1800 RN provided education on garcia self care. Pt demonstrated cleaning/yessy care and emptying and measuring garcia leg bag. Pt may still need queing on this process.

## 2020-12-26 NOTE — PLAN OF CARE
situations over which he or she has no control  12/25/2020 2029 by Davidson Robles RN  Outcome: Ongoing  12/25/2020 1231 by Kacie Meyer RN  Outcome: Ongoing  Goal: Able to verbalize and/or display a decrease in depressive symptoms  Description: Able to verbalize and/or display a decrease in depressive symptoms  12/25/2020 2029 by Davidson Robles RN  Outcome: Ongoing  12/25/2020 1231 by Kacie Meyer RN  Outcome: Ongoing  Goal: Ability to disclose and discuss suicidal ideas will improve  Description: Ability to disclose and discuss suicidal ideas will improve  12/25/2020 2029 by Davidson Robles RN  Outcome: Ongoing  12/25/2020 1231 by Kacie Meyer RN  Outcome: Ongoing  Goal: Able to verbalize support systems  Description: Able to verbalize support systems  12/25/2020 2029 by Davidson Robles RN  Outcome: Ongoing  12/25/2020 1231 by Kacie Meyer RN  Outcome: Ongoing  Goal: Absence of self-harm  Description: Absence of self-harm  12/25/2020 2029 by Davidson Robles RN  Outcome: Ongoing  12/25/2020 1231 by Kacie Meyer RN  Outcome: Ongoing  Goal: Patient specific goal  Description: Patient specific goal  12/25/2020 2029 by Davidson Robles RN  Outcome: Ongoing  12/25/2020 1231 by Kacie Meyer RN  Outcome: Ongoing  Goal: Participates in care planning  Description: Participates in care planning  12/25/2020 2029 by Davidson Robles RN  Outcome: Ongoing  12/25/2020 1231 by Kacie Meyer RN  Outcome: Ongoing     Problem: Depressive Behavior With or Without Suicide Precautions:  Goal: Able to verbalize acceptance of life and situations over which he or she has no control  Description: Able to verbalize acceptance of life and situations over which he or she has no control  12/25/2020 2029 by Davidson Robles RN  Outcome: Ongoing  12/25/2020 1231 by Kacie Meyer RN  Outcome: Ongoing  Goal: Able to verbalize and/or display a decrease in depressive symptoms  Description: Able to verbalize and/or display a decrease in depressive symptoms  12/25/2020 2029 by Dara Crockett RN  Outcome: Ongoing  12/25/2020 1231 by Gilbert Braun RN  Outcome: Ongoing  Goal: Ability to disclose and discuss suicidal ideas will improve  Description: Ability to disclose and discuss suicidal ideas will improve  12/25/2020 2029 by Dara Crockett RN  Outcome: Ongoing  12/25/2020 1231 by Gilbert Braun RN  Outcome: Ongoing  Goal: Able to verbalize support systems  Description: Able to verbalize support systems  12/25/2020 2029 by Dara Crockett RN  Outcome: Ongoing  12/25/2020 1231 by Gilbert Braun RN  Outcome: Ongoing  Goal: Absence of self-harm  Description: Absence of self-harm  12/25/2020 2029 by Dara Crockett RN  Outcome: Ongoing  12/25/2020 1231 by Gilbert Braun RN  Outcome: Ongoing  Goal: Patient specific goal  Description: Patient specific goal  12/25/2020 2029 by Dara Crockett RN  Outcome: Ongoing  12/25/2020 1231 by Gilbert Braun RN  Outcome: Ongoing  Goal: Participates in care planning  Description: Participates in care planning  12/25/2020 2029 by Dara Crockett RN  Outcome: Ongoing  12/25/2020 1231 by Gilbert Braun RN  Outcome: Ongoing     Problem: Altered Mood, Deterioration in Function:  Goal: Ability to perform activities of daily living will improve  Description: Ability to perform activities of daily living will improve  12/25/2020 2029 by Draa Crockett RN  Outcome: Ongoing  12/25/2020 1231 by Gilbert Braun RN  Outcome: Ongoing  Goal: Able to verbalize reality based thinking  Description: Able to verbalize reality based thinking  12/25/2020 2029 by Dara Crockett RN  Outcome: Ongoing  12/25/2020 1231 by Gilbert Braun RN  Outcome: Ongoing  Goal: Skin appearance normal  Description: Skin appearance normal  12/25/2020 2029 by Dara Crockett RN  Outcome: Ongoing  12/25/2020 1231 by Gilbert Braun RN  Outcome: Ongoing  Goal: Maintenance of adequate nutrition will improve  Description: Maintenance of adequate nutrition will improve  12/25/2020 2029 by Petty Deras RN  Outcome: Ongoing  12/25/2020 1231 by Tiffanie Carreon RN  Outcome: Ongoing  Goal: Ability to tolerate increased activity will improve  Description: Ability to tolerate increased activity will improve  12/25/2020 2029 by Petty Deras RN  Outcome: Ongoing  12/25/2020 1231 by Tiffanie Carreon RN  Outcome: Ongoing  Goal: Participates in care planning  Description: Participates in care planning  12/25/2020 2029 by Petty Deras RN  Outcome: Ongoing  12/25/2020 1231 by Tiffanie Carreon RN  Outcome: Ongoing  Goal: Patient specific goal  Description: Patient specific goal  12/25/2020 2029 by Petty Deras RN  Outcome: Ongoing  12/25/2020 1231 by Tiffanie Carreon RN  Outcome: Ongoing     Problem: Risk of Harm:  Goal: Ability to remain free from injury will improve  Description: Ability to remain free from injury will improve  12/25/2020 2029 by Petty Deras RN  Outcome: Ongoing  12/25/2020 1231 by Tiffanie Carreon RN  Outcome: Ongoing     Problem: Altered Mood, Manic Behavior:  Goal: Able to sleep  Description: Able to sleep  12/25/2020 2029 by Petty Deras RN  Outcome: Ongoing  12/25/2020 1231 by Tiffanie Carreon RN  Outcome: Ongoing  Goal: Able to verbalize decrease in frequency and intensity of racing thoughts  Description: Able to verbalize decrease in frequency and intensity of racing thoughts  12/25/2020 2029 by Petty Deras RN  Outcome: Ongoing  12/25/2020 1231 by Tiffanie Carreon RN  Outcome: Ongoing  Goal: Ability to disclose and discuss suicidal ideas will improve  Description: Ability to disclose and discuss suicidal ideas will improve  12/25/2020 2029 by Petty Deras RN  Outcome: Ongoing  12/25/2020 1231 by Tiffanie Carreon RN  Outcome: Ongoing  Goal: Absence of self-harm  Description: Absence of self-harm  12/25/2020 2029 by Petty Deras RN  Outcome: Ongoing  12/25/2020 1231 by Tiffanie Carreon RN  Outcome: Ongoing  Goal: Ability to achieve adequate nutritional intake will improve  Description: Ability to achieve adequate nutritional intake will improve  12/25/2020 2029 by Dara Oliva RN  Outcome: Ongoing  12/25/2020 1231 by Monroe Richards RN  Outcome: Ongoing  Goal: Ability to interact with others will improve  Description: Ability to interact with others will improve  12/25/2020 2029 by Dara Oliva RN  Outcome: Ongoing  12/25/2020 1231 by Monroe Richards RN  Outcome: Ongoing  Goal: Ability to demonstrate self-control will improve  Description: Ability to demonstrate self-control will improve  12/25/2020 2029 by Dara Oliva RN  Outcome: Ongoing  12/25/2020 1231 by Monroe Richards RN  Outcome: Ongoing  Goal: Mood stable  Description: Mood stable  12/25/2020 2029 by Dara Oliva RN  Outcome: Ongoing  12/25/2020 1231 by Monroe Richards RN  Outcome: Ongoing  Goal: Patient specific goal  Description: Patient specific goal  12/25/2020 2029 by Dara Oliva RN  Outcome: Ongoing  12/25/2020 1231 by Monroe Richards RN  Outcome: Ongoing     Problem: Altered Mood, Psychotic Behavior:  Goal: Able to demonstrate trust by eating, participating in treatment and following staff's direction  Description: Able to demonstrate trust by eating, participating in treatment and following staff's direction  12/25/2020 2029 by Dara Oliva RN  Outcome: Ongoing  12/25/2020 1231 by Monroe Richards RN  Outcome: Ongoing  Goal: Able to verbalize decrease in frequency and intensity of hallucinations  Description: Able to verbalize decrease in frequency and intensity of hallucinations  12/25/2020 2029 by Dara Oliva RN  Outcome: Ongoing  12/25/2020 1231 by Monroe Richards RN  Outcome: Ongoing  Goal: Able to verbalize reality based thinking  Description: Able to verbalize reality based thinking  12/25/2020 2029 by Dara Oliva RN  Outcome: Ongoing  12/25/2020 1231 by Monroe Richards RN  Outcome: Ongoing  Goal: Absence of self-harm  Description: Absence of self-harm  12/25/2020 2029 by Phill Cogan, RN  Outcome: Ongoing  12/25/2020 1231 by Natalya Elam RN  Outcome: Ongoing  Goal: Ability to achieve adequate nutritional intake will improve  Description: Ability to achieve adequate nutritional intake will improve  12/25/2020 2029 by Phill Cogan, RN  Outcome: Ongoing  12/25/2020 1231 by Natalya Elam RN  Outcome: Ongoing  Goal: Ability to interact with others will improve  Description: Ability to interact with others will improve  12/25/2020 2029 by Phill Cogan, RN  Outcome: Ongoing  12/25/2020 1231 by Natalya Elam RN  Outcome: Ongoing  Goal: Compliance with prescribed medication regimen will improve  Description: Compliance with prescribed medication regimen will improve  12/25/2020 2029 by Phill Cogan, RN  Outcome: Ongoing  12/25/2020 1231 by Natalya Elam RN  Outcome: Ongoing  Goal: Patient specific goal  Description: Patient specific goal  12/25/2020 2029 by Phill Cogan, RN  Outcome: Ongoing  12/25/2020 1231 by Natalya Elam RN  Outcome: Ongoing     Problem: Substance Abuse:  Goal: Absence of drug withdrawal signs and symptoms  Description: Absence of drug withdrawal signs and symptoms  12/25/2020 2029 by Phill Cogan, RN  Outcome: Ongoing  12/25/2020 1231 by Natalya Elam RN  Outcome: Ongoing  Goal: Participates in care planning  Description: Participates in care planning  12/25/2020 2029 by Phill Cogan, RN  Outcome: Ongoing  12/25/2020 1231 by Natalya Elam RN  Outcome: Ongoing  Goal: Patient specific goal  Description: Patient specific goal  12/25/2020 2029 by Phill Cogan, RN  Outcome: Ongoing  12/25/2020 1231 by Natalya Elam RN  Outcome: Ongoing

## 2020-12-26 NOTE — PLAN OF CARE
Problem: Falls - Risk of:  Goal: Will remain free from falls  Description: Will remain free from falls  12/26/2020 0838 by Latasha Naik RN  Outcome: Ongoing  12/25/2020 2029 by Chucho Ford RN  Outcome: Ongoing  Goal: Absence of physical injury  Description: Absence of physical injury  12/26/2020 0838 by Latasha Naik RN  Outcome: Ongoing  12/25/2020 2029 by Chucho Ford RN  Outcome: Ongoing     Problem: Anger Management/Homicidal Ideation:  Goal: Able to display appropriate communication and problem solving  Description: Able to display appropriate communication and problem solving  12/26/2020 0838 by Latasha Naik RN  Outcome: Ongoing  12/25/2020 2029 by Chucho Ford RN  Outcome: Ongoing  Goal: Ability to verbalize frustrations and anger appropriately will improve  Description: Ability to verbalize frustrations and anger appropriately will improve  12/26/2020 0838 by Latasha Naik RN  Outcome: Ongoing  12/25/2020 2029 by Chucho Ford RN  Outcome: Ongoing  Goal: Absence of angry outbursts  Description: Absence of angry outbursts  12/26/2020 0838 by Latasha Naik RN  Outcome: Ongoing  12/25/2020 2029 by Chucho Ford RN  Outcome: Ongoing  Goal: Absence of homicidal ideation  Description: Absence of homicidal ideation  12/26/2020 0838 by Latasha Naik RN  Outcome: Ongoing  12/25/2020 2029 by Chucho Ford RN  Outcome: Ongoing  Goal: Participates in care planning  Description: Participates in care planning  12/26/2020 0838 by Latasha Naik RN  Outcome: Ongoing  12/25/2020 2029 by Chucho Ford RN  Outcome: Ongoing  Goal: Patient specific goal  Description: Patient specific goal  12/26/2020 1418 by Latasha Naik RN  Outcome: Ongoing  12/25/2020 2029 by Chucho Ford RN  Outcome: Ongoing     Problem: Altered Mood, Depressive Behavior:  Goal: Able to verbalize acceptance of life and situations over which he or she has no control  Description: Able to verbalize acceptance of life and situations over which he or she has no control  12/26/2020 0838 by Torie Iglesias RN  Outcome: Ongoing  12/25/2020 2029 by Isael Lackey RN  Outcome: Ongoing  Goal: Able to verbalize and/or display a decrease in depressive symptoms  Description: Able to verbalize and/or display a decrease in depressive symptoms  12/26/2020 0838 by Torie Iglesias RN  Outcome: Ongoing  12/25/2020 2029 by Isael Lackey RN  Outcome: Ongoing  Goal: Ability to disclose and discuss suicidal ideas will improve  Description: Ability to disclose and discuss suicidal ideas will improve  12/26/2020 0838 by Torie Iglesias RN  Outcome: Ongoing  12/25/2020 2029 by Isael Lackey RN  Outcome: Ongoing  Goal: Able to verbalize support systems  Description: Able to verbalize support systems  12/26/2020 0838 by Torie Iglesias RN  Outcome: Ongoing  12/25/2020 2029 by Isael Lackey RN  Outcome: Ongoing  Goal: Absence of self-harm  Description: Absence of self-harm  12/26/2020 0838 by Torie Iglesias RN  Outcome: Ongoing  12/25/2020 2029 by Isael Lackey RN  Outcome: Ongoing  Goal: Patient specific goal  Description: Patient specific goal  12/26/2020 0838 by Torie Iglesias RN  Outcome: Ongoing  12/25/2020 2029 by Isael Lackey RN  Outcome: Ongoing  Goal: Participates in care planning  Description: Participates in care planning  12/26/2020 0838 by Torie Iglesias RN  Outcome: Ongoing  12/25/2020 2029 by Isael Lackey RN  Outcome: Ongoing     Problem: Depressive Behavior With or Without Suicide Precautions:  Goal: Able to verbalize acceptance of life and situations over which he or she has no control  Description: Able to verbalize acceptance of life and situations over which he or she has no control  12/26/2020 0838 by Torie Iglesias RN  Outcome: Ongoing  12/25/2020 2029 by Isael Lackey RN  Outcome: Ongoing  Goal: Able to verbalize and/or display a decrease in depressive symptoms  Description: Able to verbalize and/or display a decrease in depressive symptoms  12/26/2020 0838 by Joana Rodrigues RN  Outcome: Ongoing  12/25/2020 2029 by Hasmukh Ballesteros RN  Outcome: Ongoing  Goal: Ability to disclose and discuss suicidal ideas will improve  Description: Ability to disclose and discuss suicidal ideas will improve  12/26/2020 0838 by Joana Rodrigues RN  Outcome: Ongoing  12/25/2020 2029 by Hasmukh Ballesteros RN  Outcome: Ongoing  Goal: Able to verbalize support systems  Description: Able to verbalize support systems  12/26/2020 0838 by Joana Rodrigues RN  Outcome: Ongoing  12/25/2020 2029 by Hasmukh Ballesteros RN  Outcome: Ongoing  Goal: Absence of self-harm  Description: Absence of self-harm  12/26/2020 0838 by Joana Rodrigues RN  Outcome: Ongoing  12/25/2020 2029 by Hasmukh Ballesteros RN  Outcome: Ongoing  Goal: Patient specific goal  Description: Patient specific goal  12/26/2020 0838 by Joana Rodrigues RN  Outcome: Ongoing  12/25/2020 2029 by Hasmukh Ballesteros RN  Outcome: Ongoing  Goal: Participates in care planning  Description: Participates in care planning  12/26/2020 0838 by Joana Rodrigues RN  Outcome: Ongoing  12/25/2020 2029 by Hasmukh Ballesteros RN  Outcome: Ongoing     Problem: Altered Mood, Deterioration in Function:  Goal: Ability to perform activities of daily living will improve  Description: Ability to perform activities of daily living will improve  12/26/2020 0838 by Joana Rodrigues RN  Outcome: Ongoing  12/25/2020 2029 by Hasmukh Ballesteros RN  Outcome: Ongoing  Goal: Able to verbalize reality based thinking  Description: Able to verbalize reality based thinking  12/26/2020 0838 by Joana Rodrigues RN  Outcome: Ongoing  12/25/2020 2029 by Hasmukh Ballesteros RN  Outcome: Ongoing  Goal: Skin appearance normal  Description: Skin appearance normal  12/26/2020 0838 by Joana Rodrigues RN  Outcome: Ongoing  12/25/2020 2029 by Hasmukh Ballesteros RN  Outcome: Ongoing  Goal: Maintenance of adequate nutrition will improve  Description: Maintenance of adequate nutrition will improve  12/26/2020 0838 by Macho Ferguson RN  Outcome: Ongoing  12/25/2020 2029 by Faye Ulloa RN  Outcome: Ongoing  Goal: Ability to tolerate increased activity will improve  Description: Ability to tolerate increased activity will improve  12/26/2020 0838 by Macho Ferguson RN  Outcome: Ongoing  12/25/2020 2029 by Faye Ulloa RN  Outcome: Ongoing  Goal: Participates in care planning  Description: Participates in care planning  12/26/2020 0838 by Macho Ferguson RN  Outcome: Ongoing  12/25/2020 2029 by Faye Ulloa RN  Outcome: Ongoing  Goal: Patient specific goal  Description: Patient specific goal  12/26/2020 0838 by Macho Ferguson RN  Outcome: Ongoing  12/25/2020 2029 by Faye Ulloa RN  Outcome: Ongoing     Problem: Risk of Harm:  Goal: Ability to remain free from injury will improve  Description: Ability to remain free from injury will improve  12/26/2020 0838 by Macho Ferguson RN  Outcome: Ongoing  12/25/2020 2029 by Faye Ulloa RN  Outcome: Ongoing     Problem: Altered Mood, Manic Behavior:  Goal: Able to sleep  Description: Able to sleep  12/26/2020 0838 by Macho Ferguson RN  Outcome: Ongoing  12/25/2020 2029 by Faye Ulloa RN  Outcome: Ongoing  Goal: Able to verbalize decrease in frequency and intensity of racing thoughts  Description: Able to verbalize decrease in frequency and intensity of racing thoughts  12/26/2020 0838 by Macho Ferguson RN  Outcome: Ongoing  12/25/2020 2029 by Faye Ulloa RN  Outcome: Ongoing  Goal: Ability to disclose and discuss suicidal ideas will improve  Description: Ability to disclose and discuss suicidal ideas will improve  12/26/2020 0838 by Macho Ferguson RN  Outcome: Ongoing  12/25/2020 2029 by Faye Ulloa RN  Outcome: Ongoing  Goal: Absence of self-harm  Description: Absence of self-harm  12/26/2020 0838 by Macho Ferguson RN  Outcome: Ongoing  12/25/2020 2029 by Faye Ulloa RN  Outcome: Ongoing  Goal: Ability to achieve adequate nutritional intake will improve  Description: Ability to achieve adequate nutritional intake will improve  12/26/2020 0838 by Brianna Dietz RN  Outcome: Ongoing  12/25/2020 2029 by Jada Ashby RN  Outcome: Ongoing  Goal: Ability to interact with others will improve  Description: Ability to interact with others will improve  12/26/2020 0838 by Brianna Dietz RN  Outcome: Ongoing  12/25/2020 2029 by Jada Ashby RN  Outcome: Ongoing  Goal: Ability to demonstrate self-control will improve  Description: Ability to demonstrate self-control will improve  12/26/2020 0838 by Brianna Dietz RN  Outcome: Ongoing  12/25/2020 2029 by Jada Ashby RN  Outcome: Ongoing  Goal: Mood stable  Description: Mood stable  12/26/2020 0838 by Brianna Dietz RN  Outcome: Ongoing  12/25/2020 2029 by Jada Ashby RN  Outcome: Ongoing  Goal: Patient specific goal  Description: Patient specific goal  12/26/2020 5148 by Brianna Dietz RN  Outcome: Ongoing  12/25/2020 2029 by Jada Ashby RN  Outcome: Ongoing     Problem: Altered Mood, Psychotic Behavior:  Goal: Able to demonstrate trust by eating, participating in treatment and following staff's direction  Description: Able to demonstrate trust by eating, participating in treatment and following staff's direction  12/26/2020 0838 by Brianna Dietz RN  Outcome: Ongoing  12/25/2020 2029 by Jada Ashby RN  Outcome: Ongoing  Goal: Able to verbalize decrease in frequency and intensity of hallucinations  Description: Able to verbalize decrease in frequency and intensity of hallucinations  12/26/2020 0838 by Brianna Dietz RN  Outcome: Ongoing  12/25/2020 2029 by Jada Ashby RN  Outcome: Ongoing  Goal: Able to verbalize reality based thinking  Description: Able to verbalize reality based thinking  12/26/2020 0838 by Brianna Dietz RN  Outcome: Ongoing  12/25/2020 2029 by Jada Ashby RN  Outcome: Ongoing  Goal: Absence of self-harm  Description: Absence of self-harm  12/26/2020 0838 by Latasha Naik RN  Outcome: Ongoing  12/25/2020 2029 by Chucho Ford RN  Outcome: Ongoing  Goal: Ability to achieve adequate nutritional intake will improve  Description: Ability to achieve adequate nutritional intake will improve  12/26/2020 0838 by Latasha Naik RN  Outcome: Ongoing  12/25/2020 2029 by Chucho Ford RN  Outcome: Ongoing  Goal: Ability to interact with others will improve  Description: Ability to interact with others will improve  12/26/2020 0838 by Latasha Naik RN  Outcome: Ongoing  12/25/2020 2029 by Chucho Ford RN  Outcome: Ongoing  Goal: Compliance with prescribed medication regimen will improve  Description: Compliance with prescribed medication regimen will improve  12/26/2020 0838 by Latasha Naik RN  Outcome: Ongoing  12/25/2020 2029 by Chucho Ford RN  Outcome: Ongoing  Goal: Patient specific goal  Description: Patient specific goal  12/26/2020 0838 by Latasha Naik RN  Outcome: Ongoing  12/25/2020 2029 by Chucho Ford RN  Outcome: Ongoing     Problem: Substance Abuse:  Goal: Absence of drug withdrawal signs and symptoms  Description: Absence of drug withdrawal signs and symptoms  12/26/2020 0838 by Latasha Naik RN  Outcome: Ongoing  12/25/2020 2029 by Chucho Ford RN  Outcome: Ongoing  Goal: Participates in care planning  Description: Participates in care planning  12/26/2020 0838 by Latasha Naik RN  Outcome: Ongoing  12/25/2020 2029 by Chucho Ford RN  Outcome: Ongoing  Goal: Patient specific goal  Description: Patient specific goal  12/26/2020 0838 by Latasha Naik RN  Outcome: Ongoing  12/25/2020 2029 by Chucho Ford RN  Outcome: Ongoing

## 2020-12-26 NOTE — PROGRESS NOTES
Patient upbeat and talkative this shift. States is ready to go home on Monday. Denies depression or anxiety. Denies any nightmares last night. Compliant with meds and care. [place] draining clear yellow urine. Denies complaints.

## 2020-12-27 PROCEDURE — 6370000000 HC RX 637 (ALT 250 FOR IP): Performed by: NURSE PRACTITIONER

## 2020-12-27 PROCEDURE — 6370000000 HC RX 637 (ALT 250 FOR IP): Performed by: PSYCHIATRY & NEUROLOGY

## 2020-12-27 PROCEDURE — 99232 SBSQ HOSP IP/OBS MODERATE 35: CPT | Performed by: PSYCHIATRY & NEUROLOGY

## 2020-12-27 PROCEDURE — 6370000000 HC RX 637 (ALT 250 FOR IP): Performed by: INTERNAL MEDICINE

## 2020-12-27 PROCEDURE — 1240000000 HC EMOTIONAL WELLNESS R&B

## 2020-12-27 RX ADMIN — METOPROLOL TARTRATE 50 MG: 50 TABLET, FILM COATED ORAL at 08:13

## 2020-12-27 RX ADMIN — APIXABAN 5 MG: 5 TABLET, FILM COATED ORAL at 08:13

## 2020-12-27 RX ADMIN — ASPIRIN 81 MG: 81 TABLET, COATED ORAL at 08:13

## 2020-12-27 RX ADMIN — AMLODIPINE BESYLATE 10 MG: 10 TABLET ORAL at 08:13

## 2020-12-27 RX ADMIN — METOPROLOL TARTRATE 50 MG: 50 TABLET, FILM COATED ORAL at 21:06

## 2020-12-27 RX ADMIN — TAMSULOSIN HYDROCHLORIDE 0.4 MG: 0.4 CAPSULE ORAL at 16:56

## 2020-12-27 RX ADMIN — LOSARTAN POTASSIUM 25 MG: 25 TABLET ORAL at 08:13

## 2020-12-27 RX ADMIN — TRAZODONE HYDROCHLORIDE 50 MG: 50 TABLET ORAL at 21:06

## 2020-12-27 RX ADMIN — ALOGLIPTIN 12.5 MG: 12.5 TABLET, FILM COATED ORAL at 08:13

## 2020-12-27 RX ADMIN — APIXABAN 5 MG: 5 TABLET, FILM COATED ORAL at 21:06

## 2020-12-27 RX ADMIN — ARIPIPRAZOLE 15 MG: 10 TABLET ORAL at 21:06

## 2020-12-27 RX ADMIN — SERTRALINE HYDROCHLORIDE 50 MG: 50 TABLET ORAL at 08:13

## 2020-12-27 ASSESSMENT — PAIN SCALES - GENERAL
PAINLEVEL_OUTOF10: 0
PAINLEVEL_OUTOF10: 0

## 2020-12-27 NOTE — PLAN OF CARE
Problem: Falls - Risk of:  Goal: Will remain free from falls  Description: Will remain free from falls  12/26/2020 2029 by Yolanda Mccloud RN  Outcome: Ongoing  12/26/2020 0838 by Marielena Mart RN  Outcome: Ongoing  Goal: Absence of physical injury  Description: Absence of physical injury  12/26/2020 2029 by Yolanda Mccloud RN  Outcome: Ongoing  12/26/2020 0838 by Marielena Mart RN  Outcome: Ongoing     Problem: Anger Management/Homicidal Ideation:  Goal: Able to display appropriate communication and problem solving  Description: Able to display appropriate communication and problem solving  12/26/2020 2029 by Yolanda Mccloud RN  Outcome: Ongoing  12/26/2020 0838 by Marielena Mart RN  Outcome: Ongoing  Goal: Ability to verbalize frustrations and anger appropriately will improve  Description: Ability to verbalize frustrations and anger appropriately will improve  12/26/2020 2029 by Yolanda Mccloud RN  Outcome: Ongoing  12/26/2020 0838 by Marielena Mart RN  Outcome: Ongoing  Goal: Absence of angry outbursts  Description: Absence of angry outbursts  12/26/2020 2029 by Yolanda Mccloud RN  Outcome: Ongoing  12/26/2020 0838 by Marielena Mart RN  Outcome: Ongoing  Goal: Absence of homicidal ideation  Description: Absence of homicidal ideation  12/26/2020 2029 by Yolanda Mccloud RN  Outcome: Ongoing  12/26/2020 0838 by Marielena Mart RN  Outcome: Ongoing  Goal: Participates in care planning  Description: Participates in care planning  12/26/2020 2029 by Yolanda Mccloud RN  Outcome: Ongoing  12/26/2020 0838 by Marielena Mart RN  Outcome: Ongoing  Goal: Patient specific goal  Description: Patient specific goal  12/26/2020 2029 by Yolanda Mccloud RN  Outcome: Ongoing  12/26/2020 0838 by Marielena Mart RN  Outcome: Ongoing     Problem: Altered Mood, Depressive Behavior:  Goal: Able to verbalize acceptance of life and situations over which he or she has no control  Description: Able to verbalize acceptance of life and situations over which he or she has no control  12/26/2020 2029 by Jose Robins RN  Outcome: Ongoing  12/26/2020 0838 by Charlene Onofre RN  Outcome: Ongoing  Goal: Able to verbalize and/or display a decrease in depressive symptoms  Description: Able to verbalize and/or display a decrease in depressive symptoms  12/26/2020 2029 by Jose Robins RN  Outcome: Ongoing  12/26/2020 0838 by Charlene Onofre RN  Outcome: Ongoing  Goal: Ability to disclose and discuss suicidal ideas will improve  Description: Ability to disclose and discuss suicidal ideas will improve  12/26/2020 2029 by Jose Robins RN  Outcome: Ongoing  12/26/2020 0838 by Charlene Onofre RN  Outcome: Ongoing  Goal: Able to verbalize support systems  Description: Able to verbalize support systems  12/26/2020 2029 by Jose Robins RN  Outcome: Ongoing  12/26/2020 0838 by Charlene Onofre RN  Outcome: Ongoing  Goal: Absence of self-harm  Description: Absence of self-harm  12/26/2020 2029 by Jose Robins RN  Outcome: Ongoing  12/26/2020 0838 by Charlene Onofre RN  Outcome: Ongoing  Goal: Patient specific goal  Description: Patient specific goal  12/26/2020 2029 by Jose Robins RN  Outcome: Ongoing  12/26/2020 0838 by Charlene Onofre RN  Outcome: Ongoing  Goal: Participates in care planning  Description: Participates in care planning  12/26/2020 2029 by Jose Robins RN  Outcome: Ongoing  12/26/2020 0838 by Charlene Onofre RN  Outcome: Ongoing     Problem: Depressive Behavior With or Without Suicide Precautions:  Goal: Able to verbalize acceptance of life and situations over which he or she has no control  Description: Able to verbalize acceptance of life and situations over which he or she has no control  12/26/2020 2029 by Jose Robins RN  Outcome: Ongoing  12/26/2020 0838 by Charlene Onofre RN  Outcome: Ongoing  Goal: Able to verbalize and/or display a decrease in depressive symptoms  Description: Able to verbalize and/or display a decrease in depressive symptoms  12/26/2020 2029 by Chucho Ford RN  Outcome: Ongoing  12/26/2020 0838 by Latasha Naik RN  Outcome: Ongoing  Goal: Ability to disclose and discuss suicidal ideas will improve  Description: Ability to disclose and discuss suicidal ideas will improve  12/26/2020 2029 by Chucho Ford RN  Outcome: Ongoing  12/26/2020 0838 by Latasha Naik RN  Outcome: Ongoing  Goal: Able to verbalize support systems  Description: Able to verbalize support systems  12/26/2020 2029 by Chucho Ford RN  Outcome: Ongoing  12/26/2020 0838 by Latasha Naik RN  Outcome: Ongoing  Goal: Absence of self-harm  Description: Absence of self-harm  12/26/2020 2029 by Chucho Ford RN  Outcome: Ongoing  12/26/2020 0838 by Latasha Naik RN  Outcome: Ongoing  Goal: Patient specific goal  Description: Patient specific goal  12/26/2020 2029 by Chucho Ford RN  Outcome: Ongoing  12/26/2020 0838 by Latasha Naik RN  Outcome: Ongoing  Goal: Participates in care planning  Description: Participates in care planning  12/26/2020 2029 by Chucho Ford RN  Outcome: Ongoing  12/26/2020 0838 by Latasha Naik RN  Outcome: Ongoing     Problem: Altered Mood, Deterioration in Function:  Goal: Ability to perform activities of daily living will improve  Description: Ability to perform activities of daily living will improve  12/26/2020 2029 by Chucho Ford RN  Outcome: Ongoing  12/26/2020 0838 by Latasha Naik RN  Outcome: Ongoing  Goal: Able to verbalize reality based thinking  Description: Able to verbalize reality based thinking  12/26/2020 2029 by Chucho Ford RN  Outcome: Ongoing  12/26/2020 0838 by Latasha Naik RN  Outcome: Ongoing  Goal: Skin appearance normal  Description: Skin appearance normal  12/26/2020 2029 by Chucho Ford RN  Outcome: Ongoing  12/26/2020 0838 by Latasha Naik RN  Outcome: Ongoing  Goal: Maintenance of adequate nutrition will improve  Description: Maintenance of adequate nutrition will improve  12/26/2020 2029 by Jose Robins RN  Outcome: Ongoing  12/26/2020 0838 by Charlene Onofre RN  Outcome: Ongoing  Goal: Ability to tolerate increased activity will improve  Description: Ability to tolerate increased activity will improve  12/26/2020 2029 by Jose Robins RN  Outcome: Ongoing  12/26/2020 0838 by Charlene Onofre RN  Outcome: Ongoing  Goal: Participates in care planning  Description: Participates in care planning  12/26/2020 2029 by Jose Robins RN  Outcome: Ongoing  12/26/2020 0838 by Charlene Onofre RN  Outcome: Ongoing  Goal: Patient specific goal  Description: Patient specific goal  12/26/2020 2029 by Jose Robins RN  Outcome: Ongoing  12/26/2020 0838 by Charlene Onofre RN  Outcome: Ongoing     Problem: Risk of Harm:  Goal: Ability to remain free from injury will improve  Description: Ability to remain free from injury will improve  12/26/2020 2029 by Jose Robins RN  Outcome: Ongoing  12/26/2020 0838 by Charlene Onofre RN  Outcome: Ongoing     Problem: Altered Mood, Manic Behavior:  Goal: Able to sleep  Description: Able to sleep  12/26/2020 2029 by Jose Robins RN  Outcome: Ongoing  12/26/2020 0838 by Charlene Onofre RN  Outcome: Ongoing  Goal: Able to verbalize decrease in frequency and intensity of racing thoughts  Description: Able to verbalize decrease in frequency and intensity of racing thoughts  12/26/2020 2029 by Jose Robins RN  Outcome: Ongoing  12/26/2020 0838 by Charlene Onofre RN  Outcome: Ongoing  Goal: Ability to disclose and discuss suicidal ideas will improve  Description: Ability to disclose and discuss suicidal ideas will improve  12/26/2020 2029 by Jose Robins RN  Outcome: Ongoing  12/26/2020 0838 by Charlene Onofre RN  Outcome: Ongoing  Goal: Absence of self-harm  Description: Absence of self-harm  12/26/2020 2029 by Jose Robins RN  Outcome: Ongoing  12/26/2020 0838 by Charlene Onofre RN  Outcome: Ongoing  Goal: Ability to achieve adequate nutritional intake will improve  Description: Ability to achieve adequate nutritional intake will improve  12/26/2020 2029 by Radha Rasmussen RN  Outcome: Ongoing  12/26/2020 0838 by Elizabeth Shah RN  Outcome: Ongoing  Goal: Ability to interact with others will improve  Description: Ability to interact with others will improve  12/26/2020 2029 by Radha Rasmussen RN  Outcome: Ongoing  12/26/2020 0838 by Elizabeth Shah RN  Outcome: Ongoing  Goal: Ability to demonstrate self-control will improve  Description: Ability to demonstrate self-control will improve  12/26/2020 2029 by Radha Rasmussen RN  Outcome: Ongoing  12/26/2020 0838 by Elizabeth Shah RN  Outcome: Ongoing  Goal: Mood stable  Description: Mood stable  12/26/2020 2029 by Radha Rasmussen RN  Outcome: Ongoing  12/26/2020 0838 by Elizabeth Shah RN  Outcome: Ongoing  Goal: Patient specific goal  Description: Patient specific goal  12/26/2020 2029 by Radha Rasmussen RN  Outcome: Ongoing  12/26/2020 0838 by Elizabeth Shah RN  Outcome: Ongoing     Problem: Altered Mood, Psychotic Behavior:  Goal: Able to demonstrate trust by eating, participating in treatment and following staff's direction  Description: Able to demonstrate trust by eating, participating in treatment and following staff's direction  12/26/2020 2029 by Radha Rasmussen RN  Outcome: Ongoing  12/26/2020 0838 by Elizabeth Shah RN  Outcome: Ongoing  Goal: Able to verbalize decrease in frequency and intensity of hallucinations  Description: Able to verbalize decrease in frequency and intensity of hallucinations  12/26/2020 2029 by Radha Rasmussen RN  Outcome: Ongoing  12/26/2020 0838 by Elizabeth Shah RN  Outcome: Ongoing  Goal: Able to verbalize reality based thinking  Description: Able to verbalize reality based thinking  12/26/2020 2029 by Radha Rasmussen RN  Outcome: Ongoing  12/26/2020 0838 by Elizabeth Shah RN  Outcome: Ongoing  Goal: Absence of self-harm  Description: Absence of self-harm  12/26/2020 2029 by Gildardo Coronel RN  Outcome: Ongoing  12/26/2020 0838 by Grupo Ceja RN  Outcome: Ongoing  Goal: Ability to achieve adequate nutritional intake will improve  Description: Ability to achieve adequate nutritional intake will improve  12/26/2020 2029 by Gildardo Coronel RN  Outcome: Ongoing  12/26/2020 0838 by Grupo Ceja RN  Outcome: Ongoing  Goal: Ability to interact with others will improve  Description: Ability to interact with others will improve  12/26/2020 2029 by Gildardo Coronel RN  Outcome: Ongoing  12/26/2020 0838 by Grupo Ceja RN  Outcome: Ongoing  Goal: Compliance with prescribed medication regimen will improve  Description: Compliance with prescribed medication regimen will improve  12/26/2020 2029 by Gildardo Coronel RN  Outcome: Ongoing  12/26/2020 0838 by Grupo Ceja RN  Outcome: Ongoing  Goal: Patient specific goal  Description: Patient specific goal  12/26/2020 2029 by Gildardo Coronel RN  Outcome: Ongoing  12/26/2020 0838 by Grupo Ceja RN  Outcome: Ongoing     Problem: Substance Abuse:  Goal: Absence of drug withdrawal signs and symptoms  Description: Absence of drug withdrawal signs and symptoms  12/26/2020 2029 by Gildardo Coronel RN  Outcome: Ongoing  12/26/2020 0838 by Grupo Ceja RN  Outcome: Ongoing  Goal: Participates in care planning  Description: Participates in care planning  12/26/2020 2029 by Gildardo Coronel RN  Outcome: Ongoing  12/26/2020 0838 by Grupo Ceja RN  Outcome: Ongoing  Goal: Patient specific goal  Description: Patient specific goal  12/26/2020 2029 by Gildardo Coronel RN  Outcome: Ongoing  12/26/2020 0838 by Grupo Ceja RN  Outcome: Ongoing

## 2020-12-27 NOTE — PROGRESS NOTES
Pt up this evening socializing in TV area. Denies depression/anxiety/SI/AVH. States is ready to go home on Monday. Some repetitiveness noted. Is proud of himself for learning catheter self care. Is interested and asks appropriate questions regarding medical condition. No delusional thoughts noted and continues to deny nightmares while here.

## 2020-12-27 NOTE — PROGRESS NOTES
Psychiatric Progress Note    Javed Howell  [id number]  12/27/20    CC: \"I was having headaches, losing my balance, my legs got weak. \"     HPI: Patient noted a fall on 12/11/2020 and came to [facility]. He denied any symptoms of chest pain, SOB prior to this. Patient noted that he believed his brother was murdered in his apartment, because of this the pt was not taking care of himself via not bathing or caring for self due to fear of her self. Pt continues to remain display paranoid delusion. With poor ability to care for himself. Currently he denies SI/HI. He denies AV hallucinations however continues to display paranoid delusions. States he rates his depression as \"2\" on a scale of 0 to 10 with 0 being none and 10 being horrible. He rates his anxiety as \"2\" on the same scale. He states his sleep was \"okay,\" last night due to other patients being up on the unit throughout the night; per staff he slept 7 hours. States his appetite is improving. He is oriented x 3. He is bright and reactive on unit. Socializing well with staff. Pt remains in agreement with treatment plan. Pt was polite and cordial during the interview process. Has been taking medications and has been compliant with treatment. Awaiting results of bladder scan q shift for post void residual to inform decision on whether or not to refer to Urology after discharge, pt started on flomax. Javed Howell requires a cane due to impaired ambulation, poor balance (specify) and lateral lean. This mobility limitation significantly impairs his/her ability to participate or complete in daily living tasks such as toileting, bathing, dressing, grooming; or any other daily living task around the home. These issues would be resolved by the use of a cane as Javed Howell has demonstrated the ability to use it safely. Pt continues to have urinary issues with his bladder, twice have 1300 ml requring scan and straight cath.     No Known Allergies    Medications Prior to Admission: traZODone (DESYREL) 50 MG tablet, Take 1 tablet by mouth nightly  losartan (COZAAR) 25 MG tablet, Take 1 tablet by mouth daily  metoprolol tartrate (LOPRESSOR) 50 MG tablet, Take 1 tablet by mouth 2 times daily  amLODIPine (NORVASC) 10 MG tablet, Take 1 tablet by mouth daily  aspirin EC 81 MG EC tablet, Take 1 tablet by mouth daily    History reviewed. No pertinent past medical history. Patient Active Problem List   Diagnosis    Generalized weakness    Recurrent major depressive disorder ([facility]    Atrial fibrillation with RVR ([facility]    Major depressive disorder, recurrent episode, severe, with psychosis ([facility]    Impaired mobility    Poor balance       Review of Systems    OBJECTIVE  Vital Signs:  Vitals:    12/27/20 0815   BP: (!) 159/85   Pulse: 61   Resp: 18   Temp: 97.4 °F (36.3 °C)   SpO2: 100%       Labs:  Recent Results (from the past 48 hour(s))   Urinalysis with microscopic    Collection Time: 12/25/20  6:05 PM   Result Value Ref Range    Color, UA YELLOW YELLOW    Clarity, UA CLEAR CLEAR    Glucose, Urine 100 (A) NEGATIVE MG/DL    Bilirubin Urine NEGATIVE NEGATIVE MG/DL    Ketones, Urine NEGATIVE NEGATIVE MG/DL    Specific Gravity, UA <1.005 1.001 - 1.035    Blood, Urine LARGE (A) NEGATIVE    pH, Urine 5.0 5.0 - 8.0    Protein, UA NEGATIVE NEGATIVE MG/DL    Urobilinogen, Urine 0.2 0.2 - 1.0 MG/DL    Nitrite Urine, Quantitative NEGATIVE NEGATIVE    Leukocyte Esterase, Urine TRACE (A) NEGATIVE    Volume, (UVOL) 12 10 - 12 ML    RBC, UA 6 TO 8 0 - 3 /HPF    WBC, UA 3 TO 4 0 - 2 /HPF    Epithelial Cells, UA NO CELLS SEEN /HPF    Cast Type NO CAST FORMS SEEN NO CAST FORMS SEEN /HPF    Bacteria, UA FEW (A) NEGATIVE /HPF    Crystal Type NONE SEEN NEGATIVE /HPF    Mucus, UA 1+ (A) NEGATIVE HPF       PSYCHIATRIC ASSESSMENT / MENTAL STATUS EXAM:   Vitals: Blood pressure (!) 159/85, pulse 61, temperature 97.4 °F (36.3 °C), temperature source Temporal, resp. current medications. Start simethicone for bloating/flatulence  Will check post void residual q shift to rule out overflow incontinence  Supportive Therapy Provided  Pt had an opportunity to ask questions and address concerns  Pt encouraged to continue therapy group or individual.  Pt was in agreement with treatment plan. The risks benefits and side effects of medications were discussed with the patient, including alternatives and no treatment. Pt was offered a mask, however pt is not required to wear it, due to decrease communications, poor memory, exacerbation of mental illness, breathing and interaction with staff, additionally reduction in involvement with other pt. Pt was notified that at anytime they can have a mask if they wish pt was in agreement. The patient was counseled at length about the risks of ryan COVID-19 All of the risks, benefits, and alternatives were discussed.     Electronically signed by Sanaz Smiley DO on 12/27/2020 at 10:54 AM

## 2020-12-27 NOTE — PROGRESS NOTES
Assessment completed. Pt is alert and oriented to person, place, time and situation. Pt reports sleeping well. Pt rates depression a 0 out of 10 with ten being the worst and anxiety a 0 out of ten on the same scale. Pt denies auditory or visual hallucinations. Pt denies delusions. Pt denies SI. Pt denies HI.        1400 Pt has been in common area most of the day, resting with elevated feet. He did talk to his brother. He will be ready to pick him up after 10 AM.  Pt has been educated on follow up with urology but he has not retained this information. RN instructed patient how to switch between leg bag and garcia bag for nighttime.

## 2020-12-28 VITALS
HEART RATE: 75 BPM | SYSTOLIC BLOOD PRESSURE: 138 MMHG | HEIGHT: 70 IN | TEMPERATURE: 97.7 F | RESPIRATION RATE: 16 BRPM | OXYGEN SATURATION: 100 % | BODY MASS INDEX: 21.47 KG/M2 | WEIGHT: 150 LBS | DIASTOLIC BLOOD PRESSURE: 77 MMHG

## 2020-12-28 PROCEDURE — 6370000000 HC RX 637 (ALT 250 FOR IP): Performed by: PSYCHIATRY & NEUROLOGY

## 2020-12-28 PROCEDURE — 99239 HOSP IP/OBS DSCHRG MGMT >30: CPT | Performed by: PSYCHIATRY & NEUROLOGY

## 2020-12-28 PROCEDURE — 6370000000 HC RX 637 (ALT 250 FOR IP): Performed by: INTERNAL MEDICINE

## 2020-12-28 RX ORDER — TRAZODONE HYDROCHLORIDE 50 MG/1
50 TABLET ORAL NIGHTLY
Qty: 30 TABLET | Refills: 1 | Status: SHIPPED | OUTPATIENT
Start: 2020-12-28

## 2020-12-28 RX ORDER — LOSARTAN POTASSIUM 25 MG/1
25 TABLET ORAL DAILY
Qty: 30 TABLET | Refills: 1 | Status: SHIPPED | OUTPATIENT
Start: 2020-12-28

## 2020-12-28 RX ORDER — AMLODIPINE BESYLATE 10 MG/1
10 TABLET ORAL DAILY
Qty: 30 TABLET | Refills: 1 | Status: SHIPPED | OUTPATIENT
Start: 2020-12-28

## 2020-12-28 RX ORDER — ASPIRIN 81 MG/1
81 TABLET ORAL DAILY
Qty: 30 TABLET | Refills: 3 | Status: SHIPPED | OUTPATIENT
Start: 2020-12-29

## 2020-12-28 RX ORDER — ALOGLIPTIN 12.5 MG/1
12.5 TABLET, FILM COATED ORAL DAILY
Qty: 30 TABLET | Refills: 1 | Status: SHIPPED | OUTPATIENT
Start: 2020-12-29

## 2020-12-28 RX ORDER — METOPROLOL TARTRATE 50 MG/1
50 TABLET, FILM COATED ORAL 2 TIMES DAILY
Qty: 60 TABLET | Refills: 1 | Status: SHIPPED | OUTPATIENT
Start: 2020-12-28

## 2020-12-28 RX ORDER — TAMSULOSIN HYDROCHLORIDE 0.4 MG/1
0.4 CAPSULE ORAL EVERY EVENING
Qty: 30 CAPSULE | Refills: 1 | Status: SHIPPED | OUTPATIENT
Start: 2020-12-28

## 2020-12-28 RX ORDER — ARIPIPRAZOLE 15 MG/1
15 TABLET ORAL NIGHTLY
Qty: 30 TABLET | Refills: 0 | Status: SHIPPED | OUTPATIENT
Start: 2020-12-28

## 2020-12-28 RX ADMIN — ALOGLIPTIN 12.5 MG: 12.5 TABLET, FILM COATED ORAL at 08:05

## 2020-12-28 RX ADMIN — APIXABAN 5 MG: 5 TABLET, FILM COATED ORAL at 08:42

## 2020-12-28 RX ADMIN — ASPIRIN 81 MG: 81 TABLET, COATED ORAL at 08:05

## 2020-12-28 RX ADMIN — LOSARTAN POTASSIUM 25 MG: 25 TABLET ORAL at 08:05

## 2020-12-28 RX ADMIN — METOPROLOL TARTRATE 50 MG: 50 TABLET, FILM COATED ORAL at 08:05

## 2020-12-28 RX ADMIN — AMLODIPINE BESYLATE 10 MG: 10 TABLET ORAL at 08:05

## 2020-12-28 RX ADMIN — SERTRALINE HYDROCHLORIDE 50 MG: 50 TABLET ORAL at 08:05

## 2020-12-28 NOTE — BH NOTE
Chicho Ann was visible on the milieu. He was social with male peers and staff. He has been talkative tonight and repeated the same stories that he told me last week. He polite and cooperative. Ambulating well. Discussed medication and he was compliant with medication taken whole. He denied depression, anxiety, hallucinations, SI/HI, and paranoia. He rated irritability at 1/10. Assisted Kai with taking off Cor[place]an cath leg bag and replacing it with a larger bag for the night. Teaching him how he needs to do it when he is discharged to home. He will benefit from more teaching. He reported that he is now considered a borderline diabetic and has to take medication for it. Discussed his diet and explained to him about sugar, pop and sweets. He voiced understanding. Then explained carbs. He could use more education on carbohydrates as he does not listen well when teaching is being given. He would rather talk and tell you what he eats and what he does not like. Will continue to monitor for safety.

## 2020-12-28 NOTE — CARE COORDINATION
Patient to discharge to home this date. Home health referral faxed to [facility] for follow up care. Appointment scheduled for patient to see PCP, Dr. Alis Simental on 1/7/21 @9:15 am.  A tele- health appointment was made for 1/25 @ 10:00 am with [id number] [facility]. A bridge clinic was also scheduled for patient on 1/5 @ 3:00 pm.   A cane was ordered through [email or web address] to be delivered to his apartment. Discharge summary faxed to Mental health services, Dr. Rylie Damon and [facility].

## 2020-12-28 NOTE — PLAN OF CARE
Problem: Falls - Risk of:  Goal: Will remain free from falls  Description: Will remain free from falls  12/28/2020 0919 by Makenzie Garcia RN  Outcome: Ongoing  12/27/2020 2223 by Teddy Burdick RN  Outcome: Met This Shift  Goal: Absence of physical injury  Description: Absence of physical injury  12/28/2020 0919 by Makenzie Garcia RN  Outcome: Ongoing  12/27/2020 2223 by Teddy Burdick RN  Outcome: Met This Shift     Problem: Anger Management/Homicidal Ideation:  Goal: Able to display appropriate communication and problem solving  Description: Able to display appropriate communication and problem solving  12/28/2020 0919 by Makenzie Garcia RN  Outcome: Ongoing  12/27/2020 2223 by Teddy Burdick RN  Outcome: Ongoing  Goal: Ability to verbalize frustrations and anger appropriately will improve  Description: Ability to verbalize frustrations and anger appropriately will improve  12/28/2020 0919 by Makenzie Garcia RN  Outcome: Ongoing  12/27/2020 2223 by Teddy Burdick RN  Outcome: Met This Shift  Goal: Absence of angry outbursts  Description: Absence of angry outbursts  12/28/2020 0919 by Makenzie Garcia RN  Outcome: Ongoing  12/27/2020 2223 by Teddy Burdick RN  Outcome: Met This Shift  Goal: Absence of homicidal ideation  Description: Absence of homicidal ideation  12/28/2020 0919 by Makenzie Garcia RN  Outcome: Ongoing  12/27/2020 2223 by Teddy Burdick RN  Outcome: Met This Shift  Goal: Participates in care planning  Description: Participates in care planning  12/28/2020 0919 by Makenzie Garcia RN  Outcome: Ongoing  12/27/2020 2223 by Teddy Burdick RN  Outcome: Met This Shift  Goal: Patient specific goal  Description: Patient specific goal  12/28/2020 0919 by Makenzie Garcia RN  Outcome: Ongoing  12/27/2020 2223 by Teddy Burdick RN  Outcome: Ongoing     Problem: Altered Mood, Depressive Behavior:  Goal: Able to verbalize acceptance of life and situations over which he or she has no control  Description: Able to verbalize acceptance of life and situations over which he or she has no control  12/28/2020 0919 by Stanton Hernandez RN  Outcome: Ongoing  12/27/2020 2223 by Ashwin Trent RN  Outcome: Ongoing  Goal: Able to verbalize and/or display a decrease in depressive symptoms  Description: Able to verbalize and/or display a decrease in depressive symptoms  12/28/2020 0919 by Stanton Hernandez RN  Outcome: Ongoing  12/27/2020 2223 by Ashwin Trent RN  Outcome: Met This Shift  Goal: Ability to disclose and discuss suicidal ideas will improve  Description: Ability to disclose and discuss suicidal ideas will improve  12/28/2020 0919 by Stanton Hernandez RN  Outcome: Ongoing  12/27/2020 2223 by Ashwin Trent RN  Outcome: Met This Shift  Goal: Able to verbalize support systems  Description: Able to verbalize support systems  12/28/2020 0919 by Stanton Hernandez RN  Outcome: Ongoing  12/27/2020 2223 by Ashwin Trent RN  Outcome: Met This Shift  Goal: Absence of self-harm  Description: Absence of self-harm  12/28/2020 0919 by Stanton Hernandez RN  Outcome: Ongoing  12/27/2020 2223 by Ashwin Trent RN  Outcome: Met This Shift  Goal: Patient specific goal  Description: Patient specific goal  12/28/2020 0919 by Stanton Hernandez RN  Outcome: Ongoing  12/27/2020 2223 by Ashwin Trent RN  Outcome: Ongoing  Goal: Participates in care planning  Description: Participates in care planning  12/28/2020 0919 by Stanton Hernandez RN  Outcome: Ongoing  12/27/2020 2223 by Ashwin Trent RN  Outcome: Ongoing     Problem: Depressive Behavior With or Without Suicide Precautions:  Goal: Able to verbalize acceptance of life and situations over which he or she has no control  Description: Able to verbalize acceptance of life and situations over which he or she has no control  12/28/2020 0919 by Stanton Hernandez RN  Outcome: Ongoing  12/27/2020 2223 by Ashwin Trent RN  Outcome: Ongoing  Goal: Able to verbalize and/or Ongoing  12/27/2020 2223 by Ashwin Trent RN  Outcome: Ongoing  Goal: Maintenance of adequate nutrition will improve  Description: Maintenance of adequate nutrition will improve  12/28/2020 0919 by Stanton Hernandez RN  Outcome: Ongoing  12/27/2020 2223 by Ashwin Trent RN  Outcome: Met This Shift  Goal: Ability to tolerate increased activity will improve  Description: Ability to tolerate increased activity will improve  12/28/2020 0919 by Stanton Hernandez RN  Outcome: Ongoing  12/27/2020 2223 by Ashwin Trent RN  Outcome: Met This Shift  Goal: Participates in care planning  Description: Participates in care planning  12/28/2020 0919 by Stanton Hernandez RN  Outcome: Ongoing  12/27/2020 2223 by Ashwin Trent RN  Outcome: Met This Shift  Goal: Patient specific goal  Description: Patient specific goal  12/28/2020 0919 by Stanton Hernandez RN  Outcome: Ongoing  12/27/2020 2223 by Ashwin Trent RN  Outcome: Ongoing     Problem: Risk of Harm:  Goal: Ability to remain free from injury will improve  Description: Ability to remain free from injury will improve  12/28/2020 0919 by Stanton Hernandez RN  Outcome: Ongoing  12/27/2020 2223 by Ashwin Trent RN  Outcome: Met This Shift     Problem: Altered Mood, Manic Behavior:  Goal: Able to sleep  Description: Able to sleep  12/28/2020 0919 by Stanton Hernandez RN  Outcome: Ongoing  12/27/2020 2223 by Ashwin Trent RN  Outcome: Met This Shift  Goal: Able to verbalize decrease in frequency and intensity of racing thoughts  Description: Able to verbalize decrease in frequency and intensity of racing thoughts  12/28/2020 0919 by Stanton Hernandez RN  Outcome: Ongoing  12/27/2020 2223 by Ashwin Trent RN  Outcome: Met This Shift  Goal: Ability to disclose and discuss suicidal ideas will improve  Description: Ability to disclose and discuss suicidal ideas will improve  12/28/2020 0919 by Stanton Hernandez RN  Outcome: Ongoing  12/27/2020 2223 by Ashwin Trent RN  Outcome: Met This Shift  Goal: Absence of self-harm  Description: Absence of self-harm  12/28/2020 0919 by Melo Oakley RN  Outcome: Ongoing  12/27/2020 2223 by Violeta Sims RN  Outcome: Met This Shift  Goal: Ability to achieve adequate nutritional intake will improve  Description: Ability to achieve adequate nutritional intake will improve  12/28/2020 0919 by Melo Oakley RN  Outcome: Ongoing  12/27/2020 2223 by Violeta Sims RN  Outcome: Met This Shift  Goal: Ability to interact with others will improve  Description: Ability to interact with others will improve  12/28/2020 0919 by Melo Oakley RN  Outcome: Ongoing  12/27/2020 2223 by Violeta Sims RN  Outcome: Met This Shift  Goal: Ability to demonstrate self-control will improve  Description: Ability to demonstrate self-control will improve  12/28/2020 0919 by Melo Oakley RN  Outcome: Ongoing  12/27/2020 2223 by Violeta Sims RN  Outcome: Met This Shift  Goal: Mood stable  Description: Mood stable  12/28/2020 0919 by Melo Oakley RN  Outcome: Ongoing  12/27/2020 2223 by Violeta Sims RN  Outcome: Met This Shift  Goal: Patient specific goal  Description: Patient specific goal  12/28/2020 0919 by Melo Oakley RN  Outcome: Ongoing  12/27/2020 2223 by Violeta Sims RN  Outcome: Ongoing     Problem: Altered Mood, Psychotic Behavior:  Goal: Able to demonstrate trust by eating, participating in treatment and following staff's direction  Description: Able to demonstrate trust by eating, participating in treatment and following staff's direction  12/28/2020 0919 by Melo Oakley RN  Outcome: Ongoing  12/27/2020 2223 by Violeta Sims RN  Outcome: Met This Shift  Goal: Able to verbalize decrease in frequency and intensity of hallucinations  Description: Able to verbalize decrease in frequency and intensity of hallucinations  12/28/2020 0919 by Melo Oakley RN  Outcome: Ongoing  12/27/2020 2223 by Violeta Sims RN  Outcome: Met This Shift  Goal: Able to verbalize reality based thinking  Description: Able to verbalize reality based thinking  12/28/2020 0919 by Laura Taylor RN  Outcome: Ongoing  12/27/2020 2223 by Ancelmo Rosen RN  Outcome: Met This Shift  Goal: Absence of self-harm  Description: Absence of self-harm  12/28/2020 0919 by Laura Taylor RN  Outcome: Ongoing  12/27/2020 2223 by Ancelmo Rosen RN  Outcome: Met This Shift  Goal: Ability to achieve adequate nutritional intake will improve  Description: Ability to achieve adequate nutritional intake will improve  12/28/2020 0919 by Laura Taylor RN  Outcome: Ongoing  12/27/2020 2223 by Ancelmo Rosen RN  Outcome: Met This Shift  Goal: Ability to interact with others will improve  Description: Ability to interact with others will improve  12/28/2020 0919 by Laura Taylor RN  Outcome: Ongoing  12/27/2020 2223 by Ancelmo Rosen RN  Outcome: Met This Shift  Goal: Compliance with prescribed medication regimen will improve  Description: Compliance with prescribed medication regimen will improve  12/28/2020 0919 by Laura Taylor RN  Outcome: Ongoing  12/27/2020 2223 by Ancelmo Rosen RN  Outcome: Met This Shift  Goal: Patient specific goal  Description: Patient specific goal  12/28/2020 0919 by Laura Taylor RN  Outcome: Ongoing  12/27/2020 2223 by Ancelmo Rosen RN  Outcome: Ongoing     Problem: Substance Abuse:  Goal: Absence of drug withdrawal signs and symptoms  Description: Absence of drug withdrawal signs and symptoms  12/28/2020 0919 by Laura Taylor RN  Outcome: Ongoing  12/27/2020 2223 by Ancelmo Rosen RN  Outcome: Ongoing  Goal: Participates in care planning  Description: Participates in care planning  12/28/2020 0919 by Laura Taylor RN  Outcome: Ongoing  12/27/2020 2223 by Ancelmo Rosen RN  Outcome: Ongoing  Goal: Patient specific goal  Description: Patient specific goal  12/28/2020 0919 by Laura Taylor RN  Outcome: Ongoing  12/27/2020 2223 by Ancelmo Rosen RN  Outcome: Ongoing

## 2020-12-28 NOTE — PROGRESS NOTES
Patient calm and cooperative. Discharge instructions reviewed with patient. [facility] [address] [facility]. [place] [facility] [place]. Voiced understanding of need to  prescriptions at [address] on [facility]. Voiced understanding that BELKIS Salazar (Social Work) will be calling with Urology appointment date and time. Discharged to home with brother in private vehicle. [place] bag and tubing sent home with patient. Leg bag patent and intact at time of discharge.

## 2020-12-28 NOTE — DISCHARGE SUMMARY
[facility], THE in [place] at that    time for one month due to [place] nervous breakdown. \"  Patient states that he once [employer] hand\"           Pt was polite and cordial during the interview process.       Due to patient being a poor historian, collateral information was obtained from his brother Richar Anderson ([phone]) who stated that the patient has always \"kept to himself and not socialized much. \" Notes that he lives in his own apartment and has been on [employer] for a long time. Was not sure that patient had been diagnosed with Paranoid Schizophrenia or Bipolar disorder, but stated that he spent much of his younger years \"wandering around the country. \" Noted that just recently the patient has been expressing fears that he can no longer take care of himself and wants to leave his apartment where he has been very happy for the past 30 years.     Patient noted that he believed his brother was murdered in his apartment, because of this the pt was not taking care of himself via not bathing or caring for self due to fear of her self. Pt continues to remain display paranoid delusion. With poor ability to care for himself    During today's interview, the patient was alert & oriented x 3. He denies SI/HI, but continues to endorse both auditory and visual hallucinations. States he see things on the wall that are a \"mixture of animals and people. \" Notes that he hears \"buzzing and someone calling my name over and over. \" Denies any commands. He denies depression and anxiety. States that his sleep is \"fair - I sleep about 6 hours a night. It takes me about one hour to fall asleep. \" States that his appetite is \"fine. \" Voices the delusion that \"God parted the crowd for me when I was in [address], May of [birth year] so I could walk right through the crowd up to the front and shake Reji Bahena's hand. \"    Andrea Penny will require the following home care treatments or therapies: garcia catheter care, vital signs, medication safety and compliance. Home care will be necessary because of homebound status. The patient is in agreement to receiving home care. Hospital Course:   Patient was seen and evaluated by a multidisciplinary treatment team, in this evaluation patient was able to contribute freely. Patient was able to provide informed consent and outline the risks and benefits of medications. Layton Kaur was compliant with medications. Pt noted a significant reduction in his psychosis, poor hygine, urination, depression and anxiety during his  stay on [facility]. Pt noted that he slowly improved to the point that he was comfortable and safe to return home. Pt noted he felt his medications were working well and denied any current side effects. Treatment team encouraged  to stay out of bed and try to find activities to do, verbalized understanding. Patient reported that he felt safe on the unit and comfortable  for discharge. Pt Denied suicidal/homicidal ideations, denied any problems or concerns with medications or side effects. patient voiced progression towards  treatment goals and was offered a copy of updated treatment plan completed during visit today. Denied any immediate needs or concerns. Pt throughout his stay on [facility] pt felt like his medications were working and felt comfortable being discharged on these medications. Pt was advised to take all medications as prescribed, follow up with all scheduled appointments and abstain from any alcohol or illicit substances. Pt was in agreement. Pt felt  safe and comfortable to be discharge and to follow up with outpt mental health. Pt was very optimistic   about his D/C and returning to his home. Pt stated that he  was doing \"good,\" today. Pt stated that he slept \"about 6 hours,\" last night. Pt stated that his appetite is \"good. \"  Pt stated that he rates his depression a  \"0,\" on a scale of 0-10 with 10 being the worst and 0 being none.   Pt stated that he rates his anxiety an \"1/10,\" on the same scale. Pt denies any auditory  or visual hallucinations. Pt denied any thoughts to harm himself or anyone else. Pt felt safe and comfortable for D/C. The Pt was educated primarily by verbal means about his diagnoses and their manifestations in his life. The option for treatment including group individual  therapy programming was offered to him and the use of medications with all their potential risks, benefits, and side-effects were discussed with the pt at  length. Pt was given the opportunity to ask questions and he participated in the treatment and planning process. Pt felt ready and eager to be discharged from  the from the [facility] unit. Pt felt he was safe for this disposition. Pt was considered to be able to  participate in informed consent and decision-making with respect to medical, legal and financial issues at the time of his discharge from the [facility]. Complications: Pt continued to display urinary retention requiring catheterization for reduction. Corcoran catheter was required and urology apt was scheduled. Treatment options, medications and alternatives reviewed with Vu Bowers and they agree with the plan to discharge. Discharge on regular diet, continue activity as tolerated. Patient appears to be in stable condition and close to their baseline functioning. The patient denies suicidal or homicidal ideations and is showing future orientation. Patient no longer presented an imminent risk of danger to themselves and/or others. At the time of discharge it appears that the patient has received the maximum medical benefit from this hospitalization and can be appropriately managed with community treatment.            Medication List      ASK your doctor about these medications    amLODIPine 10 MG tablet  Commonly known as: NORVASC  Take 1 tablet by mouth daily     aspirin EC 81 MG EC tablet  Take 1 tablet by mouth daily     losartan 25 MG  No Known Problems Father     Alcohol Abuse Sister     Other Sister          from cirrhosis    Kidney Disease Brother     Heart Attack Brother         Medications Prior to Admission: traZODone (DESYREL) 50 MG tablet, Take 1 tablet by mouth nightly  losartan (COZAAR) 25 MG tablet, Take 1 tablet by mouth daily  metoprolol tartrate (LOPRESSOR) 50 MG tablet, Take 1 tablet by mouth 2 times daily  amLODIPine (NORVASC) 10 MG tablet, Take 1 tablet by mouth daily  aspirin EC 81 MG EC tablet, Take 1 tablet by mouth daily  No Known Allergies     he patient verbalized understanding and agreement with the above information  LEGAL STATUS ON DISCHARGE:  voluntary  DISCHARGE DISPOSITION:  D/C pt home  DISCHARGE CONDITION:  Stable for outpatient treatment  DISCHARGE DIET:  Resume previous home diet    ACTI VITES:  As tolerated    FOLLOWUP APPOINTMENT(S):  Psych Apt scheduled  Urology Apt scheduled  CONSULTS:   Natividad Strickland Directive POA was offered and reviewed with pt:      Talked to pts poa,went over patients benefits and other matters. Reveiwed financial options /programs ect. .. CORE MEASURES  -Was the patient discharged on two or more Antipsychotics? No       -Was Hemoglobin A1C or fasting Glucose measure done within the last 12 months? yes  -Lipid Panel measure done within the last 12 months? yes  -Pt was offered an FDA approved medication for Chemical Dependency: yes  -Pt was offered for discharged on tobacco/nicotine cessation and/or codependency cessation via medication assistance: yes not required    More than 30 mins was spent face to face with the patient to discuss the diagnosis, treatment recommendations, and prognosis. Safety planning was also reviewed. The patient agreed to go to the nearest ER or call 911 if they experienced an emergency        The patient was admitted to the psychiatric unit and monitored for stabilization.  A multidisciplinary team met with the patient on a daily basis. The diagnosis, treatment recommendations, and prognosis were reviewed with the patient.       Objective:  Vital signs in last 24 hours:  Vitals:    12/28/20 0805   BP: 138/77   Pulse: 75   Resp: 16   Temp: 97.7 °F (36.5 °C)   SpO2: 100%       Labs:      Recent Results (from the past 504 hour(s))   EKG 12 Lead    Collection Time: 12/12/20  1:17 PM   Result Value Ref Range    Ventricular Rate 84 BPM    Atrial Rate 92 BPM    QRS Duration 86 ms    Q-T Interval 374 ms    QTc Calculation (Bazett) 441 ms    R Axis -6 degrees    T Axis 44 degrees    Diagnosis       Atrial fibrillation  Abnormal ECG  No previous ECGs available  Confirmed by Iraida Ayon MD, Coco Wallis ([id number]) on 12/13/2020 2:08:42 PM     CBC Auto Differential    Collection Time: 12/12/20  1:30 PM   Result Value Ref Range    WBC 7.6 4.0 - 10.5 K/CU MM    RBC 4.62 4.6 - 6.2 M/CU MM    Hemoglobin 12.0 (L) 13.5 - 18.0 GM/DL    Hematocrit 37.4 (L) 42 - 52 %    MCV 81.0 78 - 100 FL    MCH 26.0 (L) 27 - 31 PG    MCHC 32.1 32.0 - 36.0 %    RDW 16.0 (H) 11.7 - 14.9 %    Platelets 788 446 - 041 K/CU MM    MPV 10.1 7.5 - 11.1 FL    Differential Type AUTOMATED DIFFERENTIAL     Segs Relative 74.0 (H) 36 - 66 %    Lymphocytes % 14.9 (L) 24 - 44 %    Monocytes % 9.5 (H) 0 - 4 %    Eosinophils % 0.5 0 - 3 %    Basophils % 0.7 0 - 1 %    Segs Absolute 5.6 K/CU MM    Lymphocytes Absolute 1.1 K/CU MM    Monocytes Absolute 0.7 K/CU MM    Eosinophils Absolute 0.0 K/CU MM    Basophils Absolute 0.1 K/CU MM    Nucleated RBC % 0.0 %    Total Nucleated RBC 0.0 K/CU MM    Total Immature Neutrophil 0.03 K/CU MM    Immature Neutrophil % 0.4 0 - 0.43 %   Comprehensive Metabolic Panel w/ Reflex to MG    Collection Time: 12/12/20  1:30 PM   Result Value Ref Range    Sodium 132 (L) 135 - 145 MMOL/L    Potassium 4.2 3.5 - 5.1 MMOL/L    Chloride 97 (L) 99 - 110 mMol/L    CO2 23 21 - 32 MMOL/L    BUN 23 6 - 23 MG/DL    CREATININE 1.4 (H) 0.9 - 1.3 MG/DL    Glucose 109 (H) 70 - 99 MG/DL Calcium 8.8 8.3 - 10.6 MG/DL    Alb 4.1 3.4 - 5.0 GM/DL    Total Protein 7.2 6.4 - 8.2 GM/DL    Total Bilirubin 0.8 0.0 - 1.0 MG/DL    ALT 14 10 - 40 U/L    AST 25 15 - 37 IU/L    Alkaline Phosphatase 65 40 - 129 IU/L    GFR Non- 50 (L) >60 mL/min/1.73m2    GFR African American >60 >60 mL/min/1.73m2    Anion Gap 12 4 - 16   Troponin    Collection Time: 12/12/20  1:30 PM   Result Value Ref Range    Troponin T <0.010 <0.01 NG/ML   COVID-19    Collection Time: 12/12/20  1:30 PM    Specimen: Nasopharyngeal Swab   Result Value Ref Range    Source UNKNOWN     SARS-CoV-2, NAAT NOT DETECTED    Urinalysis Reflex to Culture    Collection Time: 12/12/20  3:42 PM    Specimen: Urine   Result Value Ref Range    Color, UA YELLOW YELLOW    Clarity, UA CLEAR CLEAR    Glucose, Urine NEGATIVE NEGATIVE MG/DL    Bilirubin Urine NEGATIVE NEGATIVE MG/DL    Ketones, Urine SMALL (A) NEGATIVE MG/DL    Specific Gravity, UA 1.021 1.001 - 1.035    Blood, Urine SMALL (A) NEGATIVE    pH, Urine 5.0 5.0 - 8.0    Protein, UA 30 (A) NEGATIVE MG/DL    Urobilinogen, Urine NORMAL 0.2 - 1.0 MG/DL    Nitrite Urine, Quantitative NEGATIVE NEGATIVE    Leukocyte Esterase, Urine NEGATIVE NEGATIVE    RBC, UA 1 0 - 3 /HPF    WBC, UA 1 0 - 2 /HPF    Bacteria, UA NEGATIVE NEGATIVE /HPF    Mucus, UA RARE (A) NEGATIVE HPF    Trichomonas, UA NONE SEEN NONE SEEN /HPF   EKG 12 Lead    Collection Time: 12/12/20  9:18 PM   Result Value Ref Range    Ventricular Rate 88 BPM    Atrial Rate 288 BPM    QRS Duration 88 ms    Q-T Interval 364 ms    QTc Calculation (Bazett) 440 ms    R Axis -9 degrees    T Axis 37 degrees    Diagnosis       Atrial fibrillation  Abnormal ECG  When compared with ECG of 12-DEC-2020 13:17,  No significant change was found  Confirmed by Dung Griffin MD, Addison Valencia ([id number]) on 12/13/2020 2:09:59 PM     Comprehensive Metabolic Panel w/ Reflex to MG    Collection Time: 12/13/20  4:26 AM   Result Value Ref Range    Sodium 136 135 - 145 MMOL/L    Potassium 3.9 3.5 - 5.1 MMOL/L    Chloride 103 99 - 110 mMol/L    CO2 22 21 - 32 MMOL/L    BUN 20 6 - 23 MG/DL    CREATININE 1.2 0.9 - 1.3 MG/DL    Glucose 82 70 - 99 MG/DL    Calcium 8.1 (L) 8.3 - 10.6 MG/DL    Alb 3.7 3.4 - 5.0 GM/DL    Total Protein 5.9 (L) 6.4 - 8.2 GM/DL    Total Bilirubin 0.9 0.0 - 1.0 MG/DL    ALT 12 10 - 40 U/L    AST 23 15 - 37 IU/L    Alkaline Phosphatase 56 40 - 128 IU/L    GFR Non-African American 60 (L) >60 mL/min/1.73m2    GFR African American >60 >60 mL/min/1.73m2    Anion Gap 11 4 - 16   CBC auto differential    Collection Time: 12/13/20  4:26 AM   Result Value Ref Range    WBC 8.8 4.0 - 10.5 K/CU MM    RBC 4.21 (L) 4.6 - 6.2 M/CU MM    Hemoglobin 10.6 (L) 13.5 - 18.0 GM/DL    Hematocrit 34.3 (L) 42 - 52 %    MCV 81.5 78 - 100 FL    MCH 25.2 (L) 27 - 31 PG    MCHC 30.9 (L) 32.0 - 36.0 %    RDW 16.1 (H) 11.7 - 14.9 %    Platelets 227 218 - 737 K/CU MM    MPV 10.0 7.5 - 11.1 FL    Differential Type AUTOMATED DIFFERENTIAL     Segs Relative 73.1 (H) 36 - 66 %    Lymphocytes % 15.9 (L) 24 - 44 %    Monocytes % 9.4 (H) 0 - 4 %    Eosinophils % 0.7 0 - 3 %    Basophils % 0.6 0 - 1 %    Segs Absolute 6.5 K/CU MM    Lymphocytes Absolute 1.4 K/CU MM    Monocytes Absolute 0.8 K/CU MM    Eosinophils Absolute 0.1 K/CU MM    Basophils Absolute 0.1 K/CU MM    Nucleated RBC % 0.0 %    Total Nucleated RBC 0.0 K/CU MM    Total Immature Neutrophil 0.03 K/CU MM    Immature Neutrophil % 0.3 0 - 0.43 %   TSH without Reflex    Collection Time: 12/13/20  4:26 AM   Result Value Ref Range    TSH, High Sensitivity 0.327 0.270 - 4.20 uIu/ml   Troponin    Collection Time: 12/13/20  4:26 AM   Result Value Ref Range    Troponin T <0.010 <0.01 NG/ML   Magnesium    Collection Time: 12/14/20  4:11 PM   Result Value Ref Range    Magnesium 1.9 1.8 - 2.4 mg/dl   Potassium    Collection Time: 12/15/20 11:19 AM   Result Value Ref Range    Potassium 4.1 3.5 - 5.1 MMOL/L   Magnesium    Collection Time: 12/15/20 11:19 AM   Result Value Ref Range    Magnesium 2.0 1.8 - 2.4 mg/dl   Covid-19 Ambulatory    Collection Time: 12/16/20 11:45 AM   Result Value Ref Range    Source VIRAL SWAB     SARS-CoV-2 NOT DETECTED NOT DETECTED   COVID-19    Collection Time: 12/16/20  3:00 PM    Specimen: Nasopharyngeal Swab   Result Value Ref Range    Source UNKNOWN     SARS-CoV-2, NAAT NOT DETECTED    Comprehensive Metabolic Panel w/ Reflex to MG    Collection Time: 12/17/20  9:00 AM   Result Value Ref Range    Sodium 142 135 - 145 MMOL/L    Potassium 3.6 3.5 - 5.1 MMOL/L    Chloride 104 99 - 110 mMol/L    CO2 34 (H) 21 - 32 MMOL/L    BUN 30 (H) 6 - 23 MG/DL    CREATININE 1.3 0.9 - 1.3 MG/DL    Glucose 183 (H) 70 - 99 MG/DL    Calcium 8.8 8.3 - 10.6 MG/DL    Alb 3.6 3.4 - 5.0 GM/DL    Total Protein 6.2 (L) 6.4 - 8.2 GM/DL    Total Bilirubin 0.6 0.0 - 1.0 MG/DL    ALT 13 10 - 40 U/L    AST 15 15 - 37 IU/L    Alkaline Phosphatase 53 40 - 129 IU/L    GFR Non- 54 (L) >60 mL/min/1.73m2    GFR African American >60 >60 mL/min/1.73m2    Anion Gap 4 4 - 16   CBC    Collection Time: 12/17/20  9:00 AM   Result Value Ref Range    WBC 7.2 4.0 - 10.5 K/CU MM    RBC 4.83 4.6 - 6.2 M/CU MM    Hemoglobin 12.2 (L) 13.5 - 18.0 GM/DL    Hematocrit 39.3 (L) 42 - 52 %    MCV 81.4 78 - 100 FL    MCH 25.3 (L) 27 - 31 PG    MCHC 31.0 (L) 32.0 - 36.0 %    RDW 16.5 (H) 11.7 - 14.9 %    Platelets 836 590 - 048 K/CU MM    MPV 10.5 7.5 - 11.1 FL   Hemoglobin A1c    Collection Time: 12/17/20  9:00 AM   Result Value Ref Range    Hemoglobin A1C 6.5 (H) 4.2 - 6.3 %    eAG 140 mg/dL   Lipid panel - fasting    Collection Time: 12/17/20  9:00 AM   Result Value Ref Range    Triglycerides 82 <150 MG/DL    Cholesterol 119 <200 MG/DL    HDL 37 (L) >40 MG/DL    LDL Direct 64 <100 MG/DL   Covid-19 Ambulatory    Collection Time: 12/23/20  3:15 PM   Result Value Ref Range    Source VIRAL SWAB     SARS-CoV-2 NOT DETECTED NOT DETECTED   Urinalysis with microscopic Collection Time: 12/25/20  6:05 PM   Result Value Ref Range    Color, UA YELLOW YELLOW    Clarity, UA CLEAR CLEAR    Glucose, Urine 100 (A) NEGATIVE MG/DL    Bilirubin Urine NEGATIVE NEGATIVE MG/DL    Ketones, Urine NEGATIVE NEGATIVE MG/DL    Specific Gravity, UA <1.005 1.001 - 1.035    Blood, Urine LARGE (A) NEGATIVE    pH, Urine 5.0 5.0 - 8.0    Protein, UA NEGATIVE NEGATIVE MG/DL    Urobilinogen, Urine 0.2 0.2 - 1.0 MG/DL    Nitrite Urine, Quantitative NEGATIVE NEGATIVE    Leukocyte Esterase, Urine TRACE (A) NEGATIVE    Volume, (UVOL) 12 10 - 12 ML    RBC, UA 6 TO 8 0 - 3 /HPF    WBC, UA 3 TO 4 0 - 2 /HPF    Epithelial Cells, UA NO CELLS SEEN /HPF    Cast Type NO CAST FORMS SEEN NO CAST FORMS SEEN /HPF    Bacteria, UA FEW (A) NEGATIVE /HPF    Crystal Type NONE SEEN NEGATIVE /HPF    Mucus, UA 1+ (A) NEGATIVE HPF       40 Minutes    Electronically signed by Jolanta Breaux DO on 12/28/2020 at 9:31 AM

## 2020-12-28 NOTE — BH NOTE
Patient states he is out of his home medications. Prescriptions sent electronically to his pharmacy. Also called Dr. Addison Raymundo's office for Urology consult. Barbra Rios will send patient demographics, copy of insurance care and notes to FAX: [phone] and ask them to FAX back to us the patient's appointment date and time. We will then notify the patient of appointment.

## 2020-12-30 ENCOUNTER — TELEPHONE (OUTPATIENT)
Dept: PSYCHIATRY | Age: 70
End: 2020-12-30

## 2020-12-31 ENCOUNTER — TELEPHONE (OUTPATIENT)
Dept: PSYCHIATRY | Age: 70
End: 2020-12-31

## 2021-01-01 ENCOUNTER — TELEPHONE (OUTPATIENT)
Dept: PSYCHIATRY | Age: 71
End: 2021-01-01

## 2021-01-04 ENCOUNTER — TELEPHONE (OUTPATIENT)
Dept: PSYCHIATRY | Age: 71
End: 2021-01-04

## 2021-01-05 ENCOUNTER — TELEPHONE (OUTPATIENT)
Dept: PSYCHIATRY | Age: 71
End: 2021-01-05

## 2021-01-06 ENCOUNTER — TELEPHONE (OUTPATIENT)
Dept: PSYCHIATRY | Age: 71
End: 2021-01-06

## 2021-01-25 ENCOUNTER — TELEPHONE (OUTPATIENT)
Dept: PSYCHIATRY | Age: 71
End: 2021-01-25

## 2021-02-08 ENCOUNTER — HOSPITAL ENCOUNTER (EMERGENCY)
Age: 71
Discharge: HOME | End: 2021-02-08
Payer: MEDICARE

## 2021-02-08 VITALS
DIASTOLIC BLOOD PRESSURE: 89 MMHG | RESPIRATION RATE: 16 BRPM | WEIGHT: 150 LBS | HEIGHT: 70 IN | SYSTOLIC BLOOD PRESSURE: 138 MMHG | TEMPERATURE: 98.2 F | HEART RATE: 84 BPM | OXYGEN SATURATION: 98 % | BODY MASS INDEX: 21.47 KG/M2

## 2021-02-08 DIAGNOSIS — T83.511A URINARY TRACT INFECTION ASSOCIATED WITH INDWELLING URETHRAL CATHETER, INITIAL ENCOUNTER (HCC): Primary | ICD-10-CM

## 2021-02-08 DIAGNOSIS — R33.9 URINARY RETENTION: ICD-10-CM

## 2021-02-08 DIAGNOSIS — N39.0 URINARY TRACT INFECTION ASSOCIATED WITH INDWELLING URETHRAL CATHETER, INITIAL ENCOUNTER (HCC): Primary | ICD-10-CM

## 2021-02-08 LAB
BACTERIA: ABNORMAL /HPF
BILIRUBIN URINE: NEGATIVE MG/DL
BLOOD, URINE: ABNORMAL
CLARITY: ABNORMAL
COLOR: YELLOW
GLUCOSE, URINE: NEGATIVE MG/DL
KETONES, URINE: NEGATIVE MG/DL
LEUKOCYTE ESTERASE, URINE: ABNORMAL
MUCUS: ABNORMAL HPF
NITRITE URINE, QUANTITATIVE: POSITIVE
PH, URINE: 6 (ref 5–8)
PROTEIN UA: 100 MG/DL
RBC URINE: 31 /HPF (ref 0–3)
SPECIFIC GRAVITY UA: 1.02 (ref 1–1.03)
TRICHOMONAS: ABNORMAL /HPF
UROBILINOGEN, URINE: NEGATIVE MG/DL (ref 0.2–1)
WBC CLUMP: ABNORMAL /HPF
WBC UA: 240 /HPF (ref 0–2)

## 2021-02-08 PROCEDURE — 51702 INSERT TEMP BLADDER CATH: CPT

## 2021-02-08 PROCEDURE — 99283 EMERGENCY DEPT VISIT LOW MDM: CPT

## 2021-02-08 PROCEDURE — 6370000000 HC RX 637 (ALT 250 FOR IP): Performed by: PHYSICIAN ASSISTANT

## 2021-02-08 PROCEDURE — 81001 URINALYSIS AUTO W/SCOPE: CPT

## 2021-02-08 PROCEDURE — 87086 URINE CULTURE/COLONY COUNT: CPT

## 2021-02-08 PROCEDURE — 51798 US URINE CAPACITY MEASURE: CPT

## 2021-02-08 RX ORDER — CEPHALEXIN 500 MG/1
500 CAPSULE ORAL 2 TIMES DAILY
Qty: 14 CAPSULE | Refills: 0 | Status: SHIPPED | OUTPATIENT
Start: 2021-02-08 | End: 2021-02-15

## 2021-02-08 RX ORDER — CEPHALEXIN 250 MG/1
500 CAPSULE ORAL ONCE
Status: COMPLETED | OUTPATIENT
Start: 2021-02-08 | End: 2021-02-08

## 2021-02-08 RX ADMIN — CEPHALEXIN 500 MG: 250 CAPSULE ORAL at 15:30

## 2021-02-08 NOTE — ED PROVIDER NOTES
As physician-in-triage, I performed a medical screening history and physical exam on this patient. I performed a medical screening examination and evaluated this patient briefly kasandra tele-health platform with the purpose of initiating their ED workup in an expeditious manner. Please see notes from other ED providers regarding comprehensive evaluation including full history, physical exam, interpretation of results, and medical decision making/disposition. CHIEF COMPLAINT  Chief Complaint   Patient presents with    Other     Garcia catheter needs removed, placed on 12/16/2020       HISTORY OF PRESENT ILLNESS  Elina Alfonso is a 65 y.o. male that presents to the ED with garcia. Hospitalized December 2020 at [facility] for stroke. Garcia catheter was placed at that time. Patient notes that since December, he has had a Garcia catheter in. He recently saw family who suggested that he would need the Garcia catheter removed. Recommended that he come to the emergency department for removal.  He has not had follow-up since his hospitalization in December. He denies any bleeding or discharge at the urethral meatus. Denies additional precipitating, modifying, alleviating features. PHYSICAL EXAM  BP (!) 163/92   Pulse 89   Temp 98.2 °F (36.8 °C) (Oral)   Resp 18   Ht 5' 10\" (1.778 m)   Wt 150 lb (68 kg)   SpO2 97%   BMI 21.52 kg/m²     On exam, the patient appears in no acute distress. Speech is clear. Breathing is unlabored. Moves all extremities    Comment: Please note this report has been produced using speech recognition software and may contain errors related to that system including errors in grammar, punctuation, and spelling, as well as words and phrases that may be inappropriate. If there are any questions or concerns please feel free to contact the dictating provider for clarification.        [id number] [facility], DO  02/08/21 9440

## 2021-02-08 NOTE — ED NOTES
Discharge instructions reviewed. Pt verbalized understanding.        Serene Gottron, RN  02/08/21 0756

## 2021-02-08 NOTE — ED PROVIDER NOTES
EMERGENCY DEPARTMENT ENCOUNTER      PCP: [facility]    CHIEF COMPLAINT    Chief Complaint   Patient presents with    Other     Corcoran catheter needs removed, placed on 12/16/2020       Of note, this patient was not evaluated by attending physician. Attending physician was available for consultation. HPI    Elina Alfonso is a 65 y.o. male who presents to the emergency department today requesting to have his Corcoran catheter removed. Patient stating that he had a stroke back in December, he denies in the hospital setting and then at his long-term care facility they placed a Corcoran catheter, he is unsure why. Most likely secondary to urinary retention. He states that he has had the catheter since December and is reporting today to have it removed. He is describing no pain to the catheter irritation. No significant leakage of the catheter. He has not followed up with his primary care and/or urology since his hospitalization. No fevers chills, flank pain, bladder pain or spasming. He denies any other symptoms. REVIEW OF SYSTEMS    Constitutional:  Denies fever, chills, weight loss or weakness   HENT:  Denies sore throat or ear pain   Cardiovascular:  Denies chest pain, palpitations   Respiratory:  Denies cough or shortness of breath    GI:  Denies abdominal pain, nausea, vomiting, or diarrhea  : See HPI  Musculoskeletal:  Denies back pain  Skin:  Denies rash  Neurologic:  Denies headache, focal weakness or sensory changes   Endocrine:  Denies polyuria or polydypsia   Lymphatic:  Denies swollen glands   All other review of systems are negative  See HPI and nursing notes for additional information     PAST MEDICAL AND SURGICAL HISTORY    History reviewed. No pertinent past medical history. History reviewed. No pertinent surgical history.     CURRENT MEDICATIONS    Current Outpatient Rx   Medication Sig Dispense Refill    cephALEXin (KEFLEX) 500 MG capsule Take 1 capsule by mouth 2 times daily Topics Concern    None   Social History Narrative    None     Family History   Problem Relation Age of Onset    Heart Disease Mother     No Known Problems Father     Alcohol Abuse Sister     Other Sister          from cirrhosis    Kidney Disease Brother     Heart Attack Brother          PHYSICAL EXAM    VITAL SIGNS: /89   Pulse 84   Temp 98.2 °F (36.8 °C) (Oral)   Resp 16   Ht 5' 10\" (1.778 m)   Wt 150 lb (68 kg)   SpO2 98%   BMI 21.52 kg/m²    Constitutional:  Well developed, Well nourished. No distress  HENT:  Normocephalic, Atraumatic, PERRL. EOMI. Sclera clear. Conjunctiva normal, No discharge. Neck/Lymphatics: supple, no JVD, no swollen nodes  Cardiovascular:   RRR,  no murmurs/rubs/gallops. o JVD  No carotid bruits or murmurs heard in carotids. Respiratory:  Nonlabored breathing. Normal breath sounds, No wheezing  Abdomen: Bowel sounds normal, Soft, No tenderness, no masses. : Evidence of indwelling catheter on exam, foul-smelling, no urethral irritation, no obvious blood. No tenderness into the penile shaft, bladder. No testicular tenderness. Musculoskeletal:    There is no edema, asymmetry, or calf / thigh tenderness bilaterally. No cyanosis. No cool or pale-appearing limb. Distal cap refill and pulses intact bilateral upper and lower extremities  Bilateral upper and lower extremity ROM intact without pain or obvious deficit  Integument: No obvious indurated macular erythema in the perineum, no crepitus. No skin breakdown. Neurologic: Alert & oriented , No focal deficits noted. Cranial nerves II through XII grossly intact. Normal gross motor coordination & motor strength bilateral upper and lower extremities  Sensation intact.   Psychiatric:  Affect normal, Mood normal.       Labs:  Results for orders placed or performed during the hospital encounter of 21   Urinalysis Reflex to Culture    Specimen: Urine   Result Value Ref Range    Color, UA YELLOW YELLOW    Clarity, UA SLIGHTLY CLOUDY (A) CLEAR    Glucose, Urine NEGATIVE NEGATIVE MG/DL    Bilirubin Urine NEGATIVE NEGATIVE MG/DL    Ketones, Urine NEGATIVE NEGATIVE MG/DL    Specific Gravity, UA 1.019 1.001 - 1.035    Blood, Urine MODERATE (A) NEGATIVE    pH, Urine 6.0 5.0 - 8.0    Protein,  (A) NEGATIVE MG/DL    Urobilinogen, Urine NEGATIVE 0.2 - 1.0 MG/DL    Nitrite Urine, Quantitative POSITIVE (A) NEGATIVE    Leukocyte Esterase, Urine LARGE (A) NEGATIVE    RBC, UA 31 (H) 0 - 3 /HPF    WBC,  (H) 0 - 2 /HPF    Bacteria, UA MANY (A) NEGATIVE /HPF    WBC Clumps, UA MANY /HPF    Mucus, UA OCCASIONAL (A) NEGATIVE HPF    Trichomonas, UA NONE SEEN NONE SEEN /HPF     ED COURSE & MEDICAL DECISION MAKING     Patient presents as above. Patient is asking to have his indwelling Corcoran catheter removed, he had a place after a hospital/rehab stent for an underlying stroke. Has had 0 follow-up since then. Has had the catheter in since December. He is reporting today to have it removed. He is unsure of exactly why he had it placed, I believe it is most likely secondary to acute urinary retention. After much discussion with the patient we did remove the catheter provided a bladder scan which did show that he had 45 in his bladder. We then waited 2 hours and he was unable to urinate. At this point he was offered further evaluation in the emergency department and possible admission or to have the Corcoran replaced and then have case management evaluate him to get some follow-up as an outpatient. He does not want any further work-up here today. He would rather have the Corcoran replaced and to be consulted y to urology for outpatient management. We did have the case management team talk to him. We will treat him with Keflex just for a catheter associated urinary tract infection secondary to the nature of his urine. We will send that to his pharmacy.   He was educated about urinary retention especially with a history of a recent neurologic issue. He will be given follow-up with the urologist, he was also given strict return precautions for any new or developing issues. Patient agrees to return emergency department if symptoms worsen or any new symptoms develop. Vital signs and nursing notes reviewed during ED course. Clinical  IMPRESSION    1. Urinary tract infection associated with indwelling urethral catheter, initial encounter ([facility] [address]    2. Urinary retention      Comment: Please note this report has been produced using speech recognition software and may contain errors related to that system including errors in grammar, punctuation, and spelling, as well as words and phrases that may be inappropriate. If there are any questions or concerns please feel free to contact the dictating provider for clarification.           Sakina Humphries 411, PA  02/08/21 5342

## 2021-02-09 LAB
CULTURE: NORMAL
Lab: NORMAL
SPECIMEN: NORMAL

## 2021-02-09 NOTE — CARE COORDINATION
CM - Called to room 41 for for pt that had been admitted  to  a hospital for a for stroke symptoms --could not  urinate was given a garcia and sent home --that was in December. , and pt comes in tonight referred by his PCP -Dr Val Lewis. Pt still has  His garcia . In the Ed the garcia was changed after 2 hours , because he could not urinate . When asked pt said he was waiting for a call back for an appointment . Told the pt. HE had to call for an appointment it was his responsibility to make the appointment . He is now to follow up with Dr Teran Monday Sivakumar Swain) CM will follow up and call pt with a reminder --Reassured pt and underscored pt saida to contact Urology .  Estela Franz / Yue Olmsteadster

## 2021-03-30 ENCOUNTER — HOSPITAL ENCOUNTER (EMERGENCY)
Age: 71
Discharge: HOME | End: 2021-03-30
Payer: MEDICARE

## 2021-03-30 VITALS
WEIGHT: 150 LBS | DIASTOLIC BLOOD PRESSURE: 96 MMHG | HEIGHT: 70 IN | SYSTOLIC BLOOD PRESSURE: 184 MMHG | OXYGEN SATURATION: 98 % | TEMPERATURE: 97.9 F | RESPIRATION RATE: 17 BRPM | BODY MASS INDEX: 21.47 KG/M2 | HEART RATE: 73 BPM

## 2021-03-30 DIAGNOSIS — N39.0 URINARY TRACT INFECTION ASSOCIATED WITH INDWELLING URETHRAL CATHETER, INITIAL ENCOUNTER (HCC): Primary | ICD-10-CM

## 2021-03-30 DIAGNOSIS — T83.511A URINARY TRACT INFECTION ASSOCIATED WITH INDWELLING URETHRAL CATHETER, INITIAL ENCOUNTER (HCC): Primary | ICD-10-CM

## 2021-03-30 DIAGNOSIS — R33.9 URINARY RETENTION: ICD-10-CM

## 2021-03-30 LAB
ANION GAP SERPL CALCULATED.3IONS-SCNC: 10 MMOL/L (ref 4–16)
BACTERIA: NEGATIVE /HPF
BASOPHILS ABSOLUTE: 0.1 K/CU MM
BASOPHILS RELATIVE PERCENT: 0.6 % (ref 0–1)
BILIRUBIN URINE: NEGATIVE MG/DL
BLOOD, URINE: ABNORMAL
BUN BLDV-MCNC: 23 MG/DL (ref 6–23)
CALCIUM SERPL-MCNC: 8.7 MG/DL (ref 8.3–10.6)
CHLORIDE BLD-SCNC: 100 MMOL/L (ref 99–110)
CLARITY: ABNORMAL
CO2: 25 MMOL/L (ref 21–32)
COLOR: YELLOW
CREAT SERPL-MCNC: 1.2 MG/DL (ref 0.9–1.3)
DIFFERENTIAL TYPE: ABNORMAL
EOSINOPHILS ABSOLUTE: 0.2 K/CU MM
EOSINOPHILS RELATIVE PERCENT: 1.4 % (ref 0–3)
GFR AFRICAN AMERICAN: >60 ML/MIN/1.73M2
GFR NON-AFRICAN AMERICAN: 60 ML/MIN/1.73M2
GLUCOSE BLD-MCNC: 120 MG/DL (ref 70–99)
GLUCOSE, URINE: NEGATIVE MG/DL
HCT VFR BLD CALC: 40.2 % (ref 42–52)
HEMOGLOBIN: 12.3 GM/DL (ref 13.5–18)
IMMATURE NEUTROPHIL %: 0.3 % (ref 0–0.43)
KETONES, URINE: NEGATIVE MG/DL
LEUKOCYTE ESTERASE, URINE: ABNORMAL
LYMPHOCYTES ABSOLUTE: 1.7 K/CU MM
LYMPHOCYTES RELATIVE PERCENT: 15.6 % (ref 24–44)
MCH RBC QN AUTO: 25.7 PG (ref 27–31)
MCHC RBC AUTO-ENTMCNC: 30.6 % (ref 32–36)
MCV RBC AUTO: 83.9 FL (ref 78–100)
MONOCYTES ABSOLUTE: 0.9 K/CU MM
MONOCYTES RELATIVE PERCENT: 8 % (ref 0–4)
NITRITE URINE, QUANTITATIVE: NEGATIVE
NUCLEATED RBC %: 0 %
PDW BLD-RTO: 14.9 % (ref 11.7–14.9)
PH, URINE: 8 (ref 5–8)
PLATELET # BLD: 281 K/CU MM (ref 140–440)
PMV BLD AUTO: 9.8 FL (ref 7.5–11.1)
POTASSIUM SERPL-SCNC: 4.8 MMOL/L (ref 3.5–5.1)
PROTEIN UA: >500 MG/DL
RBC # BLD: 4.79 M/CU MM (ref 4.6–6.2)
RBC URINE: 1290 /HPF (ref 0–3)
SEGMENTED NEUTROPHILS ABSOLUTE COUNT: 8.1 K/CU MM
SEGMENTED NEUTROPHILS RELATIVE PERCENT: 74.1 % (ref 36–66)
SODIUM BLD-SCNC: 135 MMOL/L (ref 135–145)
SPECIFIC GRAVITY UA: 1.02 (ref 1–1.03)
TOTAL IMMATURE NEUTOROPHIL: 0.03 K/CU MM
TOTAL NUCLEATED RBC: 0 K/CU MM
TRICHOMONAS: ABNORMAL /HPF
URIC ACID CRYSTALS: ABNORMAL /HPF
UROBILINOGEN, URINE: NEGATIVE MG/DL (ref 0.2–1)
WBC # BLD: 10.9 K/CU MM (ref 4–10.5)
WBC CLUMP: ABNORMAL /HPF
WBC UA: 343 /HPF (ref 0–2)

## 2021-03-30 PROCEDURE — 51798 US URINE CAPACITY MEASURE: CPT

## 2021-03-30 PROCEDURE — 81001 URINALYSIS AUTO W/SCOPE: CPT

## 2021-03-30 PROCEDURE — 85025 COMPLETE CBC W/AUTO DIFF WBC: CPT

## 2021-03-30 PROCEDURE — 93005 ELECTROCARDIOGRAM TRACING: CPT | Performed by: PHYSICIAN ASSISTANT

## 2021-03-30 PROCEDURE — 51702 INSERT TEMP BLADDER CATH: CPT

## 2021-03-30 PROCEDURE — 99285 EMERGENCY DEPT VISIT HI MDM: CPT

## 2021-03-30 PROCEDURE — 80048 BASIC METABOLIC PNL TOTAL CA: CPT

## 2021-03-30 PROCEDURE — 6370000000 HC RX 637 (ALT 250 FOR IP): Performed by: PHYSICIAN ASSISTANT

## 2021-03-30 PROCEDURE — 87086 URINE CULTURE/COLONY COUNT: CPT

## 2021-03-30 PROCEDURE — 87186 SC STD MICRODIL/AGAR DIL: CPT

## 2021-03-30 PROCEDURE — 87077 CULTURE AEROBIC IDENTIFY: CPT

## 2021-03-30 RX ORDER — CEFDINIR 300 MG/1
300 CAPSULE ORAL 2 TIMES DAILY
Qty: 19 CAPSULE | Refills: 0 | Status: SHIPPED | OUTPATIENT
Start: 2021-03-30 | End: 2021-04-09

## 2021-03-30 RX ORDER — CEFDINIR 300 MG/1
300 CAPSULE ORAL ONCE
Status: COMPLETED | OUTPATIENT
Start: 2021-03-30 | End: 2021-03-30

## 2021-03-30 RX ADMIN — CEFDINIR 300 MG: 300 CAPSULE ORAL at 23:18

## 2021-03-30 ASSESSMENT — PAIN SCALES - GENERAL: PAINLEVEL_OUTOF10: 3

## 2021-03-30 NOTE — ED NOTES
[facility]       Shanna Dears, RN  03/30/21 1950

## 2021-03-30 NOTE — ED NOTES
Report and pt car handed off to [facility].       Jay Craig RN  03/30/21 9283

## 2021-03-30 NOTE — ED PROVIDER NOTES
This EKG was interpreted by me. Rate is 97, rhythm is a fib. AR and QT intervals are within normal limits. There is no ST segment or T wave changes. This EKG was compared to previous EKG from date 12/12/2020. Appears similar to previous EKG.      Elouise Spurling, DO  03/30/21 8659

## 2021-03-30 NOTE — ED PROVIDER NOTES
As physician-in-triage, I performed a medical screening history and physical exam on this patient. I performed a medical screening examination and evaluated this patient briefly kasandra tele-health platform with the purpose of initiating their ED workup in an expeditious manner. Please see notes from other ED providers regarding comprehensive evaluation including full history, physical exam, interpretation of results, and medical decision making/disposition. CHIEF COMPLAINT  Chief Complaint   Patient presents with    Other     urinary catheter problem; states there is more pain than usually       HISTORY OF PRESENT ILLNESS  Erin Brody is a 65 y.o. male that presents to the emergency department for suspected Corcoran catheter problem. Patient was hospitalized in December 2020 at [facility] for stroke. Corcoran catheter was placed at that time. He has had a no Corcoran catheter since then. Has not had follow-up with urology. Over the past 24 hours, patient has been feeling pressure in his bladder. Does not feel that the Corcoran is draining. Denies dysuria or hematuria. No known precipitating, modifying, alleviating features. No pelvic or penile trauma. .      PHYSICAL EXAM  BP (!) 206/120   Pulse 100   Temp 97.9 °F (36.6 °C) (Oral)   Resp 15   Ht 5' 10\" (1.778 m)   Wt 150 lb (68 kg)   SpO2 98%   BMI 21.52 kg/m²     On exam, the patient appears in no acute distress. Speech is clear. Breathing is unlabored. Moves all extremities    Comment: Please note this report has been produced using speech recognition software and may contain errors related to that system including errors in grammar, punctuation, and spelling, as well as words and phrases that may be inappropriate. If there are any questions or concerns please feel free to contact the dictating provider for clarification.        Dileep Jhaveri DO  03/30/21 5094

## 2021-03-30 NOTE — ED PROVIDER NOTES
pain or syncope  Pulmonary: No difficulty breathing or new cough  General: No fevers or chills  : See HPI  See HPI for further details. All other systems reviewed and are negative. PAST MEDICAL & SURGICAL HISTORY    History reviewed. No pertinent past medical history. History reviewed. No pertinent surgical history.     CURRENT MEDICATIONS    Current Outpatient Rx   Medication Sig Dispense Refill    cefdinir (OMNICEF) 300 MG capsule Take 1 capsule by mouth 2 times daily for 10 days 19 capsule 0    sertraline (ZOLOFT) 50 MG tablet Take 1 tablet by mouth daily 30 tablet 0    ARIPiprazole (ABILIFY) 15 MG tablet Take 1 tablet by mouth nightly 30 tablet 0    aspirin 81 MG EC tablet Take 1 tablet by mouth daily 30 tablet 3    traZODone (DESYREL) 50 MG tablet Take 1 tablet by mouth nightly 30 tablet 1    alogliptin (NESINA) 12.5 MG TABS tablet Take 1 tablet by mouth daily 30 tablet 1    losartan (COZAAR) 25 MG tablet Take 1 tablet by mouth daily 30 tablet 1    metoprolol tartrate (LOPRESSOR) 50 MG tablet Take 1 tablet by mouth 2 times daily 60 tablet 1    amLODIPine (NORVASC) 10 MG tablet Take 1 tablet by mouth daily 30 tablet 1    tamsulosin (FLOMAX) 0.4 MG capsule Take 1 capsule by mouth every evening 30 capsule 1       ALLERGIES    No Known Allergies    SOCIAL AND FAMILY HISTORY    Social History     Socioeconomic History    Marital status:      Spouse name: None    Number of children: 0    Years of education: 9    Highest education level: None   Occupational History    None   Social Needs    Financial resource strain: None    Food insecurity     Worry: None     Inability: None    Transportation needs     Medical: None     Non-medical: None   Tobacco Use    Smoking status: Never Smoker    Smokeless tobacco: Never Used   Substance and Sexual Activity    Alcohol use: Not Currently    Drug use: Not Currently    Sexual activity: Not Currently   Lifestyle    Physical activity     Days per week: None     Minutes per session: None    Stress: None   Relationships    Social connections     Talks on phone: None     Gets together: None     Attends Scientologist service: None     Active member of club or organization: None     Attends meetings of clubs or organizations: None     Relationship status: None    Intimate partner violence     Fear of current or ex partner: None     Emotionally abused: None     Physically abused: None     Forced sexual activity: None   Other Topics Concern    None   Social History Narrative    None     Family History   Problem Relation Age of Onset    Heart Disease Mother     No Known Problems Father     Alcohol Abuse Sister     Other Sister          from cirrhosis    Kidney Disease Brother     Heart Attack Brother        PHYSICAL EXAM    VITAL SIGNS: BP (!) 184/96   Pulse 73   Temp 97.9 °F (36.6 °C) (Oral)   Resp 17   Ht 5' 10\" (1.778 m)   Wt 150 lb (68 kg)   SpO2 98%   BMI 21.52 kg/m²   Constitutional:  Well developed, well nourished  Eyes:  Sclera nonicteric, conjunctiva moist  HENT:  Atraumatic, nose normal  Neck: Supple, no JVD  Respiratory:  No retractions, no accessory muscle use, normal breath sounds   Cardiovascular:  regular rate, irregularly irregular rhythm, no murmurs  GI:  Soft, + suprapubic abdominal fullness and tenderness, bowel sounds present, no audible bruits or palpable pulsatile masses. Musculoskeletal:  No edema, no acute deformity   Vascular: DP pulses 2+ equal bilaterally  Integument: No rash, dry skin  GENITOURINARY:  Penile lesions are absent. There is no urethral discharge or bleeding but garcia catheter is in place but does not appear to be draining properly. There is no penile erythema, edema, or deformity. There is no scrotal erythema, edema, masses, or tenderness. Inguinal hernias are absent. Perineal crepitus, ecchymoses, erythema, and masses are absent.       Neurologic:   - Alert & oriented, no slurred speech  - No obvious gross motor deficits  - Cranial nerves 2-12 grossly intact  - Light touch sensation intact throughout.  -Strength 5 out of 5 in all extremities    Psychiatric: Cooperative, pleasant affect       LABS:  Results for orders placed or performed during the hospital encounter of 03/30/21   CBC Auto Differential   Result Value Ref Range    WBC 10.9 (H) 4.0 - 10.5 K/CU MM    RBC 4.79 4.6 - 6.2 M/CU MM    Hemoglobin 12.3 (L) 13.5 - 18.0 GM/DL    Hematocrit 40.2 (L) 42 - 52 %    MCV 83.9 78 - 100 FL    MCH 25.7 (L) 27 - 31 PG    MCHC 30.6 (L) 32.0 - 36.0 %    RDW 14.9 11.7 - 14.9 %    Platelets 137 669 - 314 K/CU MM    MPV 9.8 7.5 - 11.1 FL    Differential Type AUTOMATED DIFFERENTIAL     Segs Relative 74.1 (H) 36 - 66 %    Lymphocytes % 15.6 (L) 24 - 44 %    Monocytes % 8.0 (H) 0 - 4 %    Eosinophils % 1.4 0 - 3 %    Basophils % 0.6 0 - 1 %    Segs Absolute 8.1 K/CU MM    Lymphocytes Absolute 1.7 K/CU MM    Monocytes Absolute 0.9 K/CU MM    Eosinophils Absolute 0.2 K/CU MM    Basophils Absolute 0.1 K/CU MM    Nucleated RBC % 0.0 %    Total Nucleated RBC 0.0 K/CU MM    Total Immature Neutrophil 0.03 K/CU MM    Immature Neutrophil % 0.3 0 - 0.43 %   Basic Metabolic Panel w/ Reflex to MG   Result Value Ref Range    Sodium 135 135 - 145 MMOL/L    Potassium 4.8 3.5 - 5.1 MMOL/L    Chloride 100 99 - 110 mMol/L    CO2 25 21 - 32 MMOL/L    Anion Gap 10 4 - 16    BUN 23 6 - 23 MG/DL    CREATININE 1.2 0.9 - 1.3 MG/DL    Glucose 120 (H) 70 - 99 MG/DL    Calcium 8.7 8.3 - 10.6 MG/DL    GFR Non-African American 60 (L) >60 mL/min/1.73m2    GFR African American >60 >60 mL/min/1.73m2   Urinalysis Reflex to Culture    Specimen: Urine   Result Value Ref Range    Color, UA YELLOW YELLOW    Clarity, UA CLOUDY (A) CLEAR    Glucose, Urine NEGATIVE NEGATIVE MG/DL    Bilirubin Urine NEGATIVE NEGATIVE MG/DL    Ketones, Urine NEGATIVE NEGATIVE MG/DL    Specific Gravity, UA 1.023 1.001 - 1.035    Blood, Urine MODERATE (A) NEGATIVE    pH, Urine 8.0 5.0 - 8.0    Protein, UA >500 (HH) NEGATIVE MG/DL    Urobilinogen, Urine NEGATIVE 0.2 - 1.0 MG/DL    Nitrite Urine, Quantitative NEGATIVE NEGATIVE    Leukocyte Esterase, Urine MODERATE (A) NEGATIVE    RBC, UA 1,290 (H) 0 - 3 /HPF    WBC,  (H) 0 - 2 /HPF    Bacteria, UA NEGATIVE NEGATIVE /HPF    WBC Clumps, UA MANY /HPF    Trichomonas, UA NONE SEEN NONE SEEN /HPF    Uric Acid Mis, UA OCCASIONAL /HPF     Urine culture ordered      EKG Interpretation  Please see ED physician's note for EKG interpretation - Dr. Cyndie Golden      ED [facility]       Vital signs and nursing notes reviewed during ED course. I have independently evaluated this patient. Supervising physician present in the Emergency Department, available for consultation, throughout entirety of patient care. Patient presents as above which prompted work-up today. Labs and EKG obtained prior to me assuming patient's care. For EKG interpretation- see ED physician's note. Labs reveal mild leukocytosis and urine appears infected with proteinuria also present. Patient and I discussed findings and need for follow up with urology. Case management consulted to help with follow up with urology and PCP- see CM note for details. Patient given first dose of antibiotic in ED for UTI. Patient's garcia catheter was not properly draining and bladder scan showed 215 mL of urine present despite garcia in place. Garcia has not been changed since last ED visit as patient has never followed up with urology. Garcia catheter changed in the ED and patient wanted to try voiding trial. Patient unable to urinate for voiding trial and bladder scan now shows 318 mL of urine present. Garcia catheter replaced and is now draining properly. Suprapubic discomfort resolved with new garcia catheter. Patient understands need for follow up. Prescription for omnicef provided- we discussed this medication. Patient is nontoxic appearing. Vital signs stable.   Patient is stable for outpatient management. All pertinent Lab data and radiographic results reviewed with patient at bedside. The patient and/or the family were informed of the results of any tests/labs/imaging, the treatment plan, and time was allotted to answer questions. Diagnosis, disposition, and treatment plan reviewed in detail with patient. Patient understands and agrees to follow-up with urologist for recheck as soon as possible and with PCP for recheck in 2-3 days. Patient understands and agrees to return to emergency department for any new or worsening symptoms - strict return precautions given. Clinical  IMPRESSION    1. Urinary tract infection associated with indwelling urethral catheter, initial encounter ([facility] [address]    2. Urinary retention              Comment: Please note this report has been produced using speech recognition software and may contain errors related to that system including errors in grammar, punctuation, and spelling, as well as words and phrases that may be inappropriate. If there are any questions or concerns please feel free to contact the dictating provider for clarification.            Rajendra Schuler PA-C  04/01/21 9041

## 2021-03-31 NOTE — ED NOTES
Reviewed discharge paperwork with pt. Answered all questions. Pt verbalized understanding.         Marla Fairchild RN  03/30/21 6161

## 2021-04-01 LAB
CULTURE: ABNORMAL
EKG DIAGNOSIS: NORMAL
EKG Q-T INTERVAL: 334 MS
EKG QRS DURATION: 86 MS
EKG QTC CALCULATION (BAZETT): 424 MS
EKG R AXIS: -27 DEGREES
EKG T AXIS: 67 DEGREES
EKG VENTRICULAR RATE: 97 BPM
Lab: ABNORMAL
SPECIMEN: ABNORMAL

## 2021-04-01 PROCEDURE — 93010 ELECTROCARDIOGRAM REPORT: CPT | Performed by: INTERNAL MEDICINE

## 2021-04-16 ENCOUNTER — HOSPITAL ENCOUNTER (EMERGENCY)
Age: 71
Discharge: HOME | End: 2021-04-16
Payer: MEDICARE

## 2021-04-16 VITALS
BODY MASS INDEX: 21.47 KG/M2 | DIASTOLIC BLOOD PRESSURE: 97 MMHG | WEIGHT: 150 LBS | SYSTOLIC BLOOD PRESSURE: 180 MMHG | HEIGHT: 70 IN | RESPIRATION RATE: 16 BRPM | HEART RATE: 92 BPM | OXYGEN SATURATION: 98 % | TEMPERATURE: 98 F

## 2021-04-16 DIAGNOSIS — T83.9XXA COMPLICATION OF FOLEY CATHETER, INITIAL ENCOUNTER (HCC): Primary | ICD-10-CM

## 2021-04-16 DIAGNOSIS — R21 RASH AND OTHER NONSPECIFIC SKIN ERUPTION: ICD-10-CM

## 2021-04-16 LAB
BACTERIA: NEGATIVE /HPF
BILIRUBIN URINE: NEGATIVE MG/DL
BLOOD, URINE: ABNORMAL
CLARITY: ABNORMAL
COLOR: YELLOW
GLUCOSE, URINE: NEGATIVE MG/DL
KETONES, URINE: NEGATIVE MG/DL
LEUKOCYTE ESTERASE, URINE: ABNORMAL
MUCUS: ABNORMAL HPF
NITRITE URINE, QUANTITATIVE: NEGATIVE
PH, URINE: 5 (ref 5–8)
PROTEIN UA: 100 MG/DL
RBC URINE: 25 /HPF (ref 0–3)
SPECIFIC GRAVITY UA: 1.02 (ref 1–1.03)
SQUAMOUS EPITHELIAL: 1 /HPF
TRICHOMONAS: ABNORMAL /HPF
UROBILINOGEN, URINE: NEGATIVE MG/DL (ref 0.2–1)
WBC CLUMP: ABNORMAL /HPF
WBC UA: 208 /HPF (ref 0–2)

## 2021-04-16 PROCEDURE — 99285 EMERGENCY DEPT VISIT HI MDM: CPT

## 2021-04-16 PROCEDURE — 87086 URINE CULTURE/COLONY COUNT: CPT

## 2021-04-16 PROCEDURE — 81001 URINALYSIS AUTO W/SCOPE: CPT

## 2021-04-16 NOTE — ED PROVIDER NOTES
1 tablet by mouth nightly 30 tablet 0    aspirin 81 MG EC tablet Take 1 tablet by mouth daily 30 tablet 3    traZODone (DESYREL) 50 MG tablet Take 1 tablet by mouth nightly 30 tablet 1    alogliptin (NESINA) 12.5 MG TABS tablet Take 1 tablet by mouth daily 30 tablet 1    losartan (COZAAR) 25 MG tablet Take 1 tablet by mouth daily 30 tablet 1    metoprolol tartrate (LOPRESSOR) 50 MG tablet Take 1 tablet by mouth 2 times daily 60 tablet 1    amLODIPine (NORVASC) 10 MG tablet Take 1 tablet by mouth daily 30 tablet 1    tamsulosin (FLOMAX) 0.4 MG capsule Take 1 capsule by mouth every evening 30 capsule 1       ALLERGIES    No Known Allergies    SOCIAL AND FAMILY HISTORY    Social History     Socioeconomic History    Marital status:      Spouse name: None    Number of children: 0    Years of education: 9    Highest education level: None   Occupational History    None   Social Needs    Financial resource strain: None    Food insecurity     Worry: None     Inability: None    Transportation needs     Medical: None     Non-medical: None   Tobacco Use    Smoking status: Former Smoker    Smokeless tobacco: Never Used   Substance and Sexual Activity    Alcohol use: Not Currently    Drug use: Not Currently    Sexual activity: Not Currently   Lifestyle    Physical activity     Days per week: None     Minutes per session: None    Stress: None   Relationships    Social connections     Talks on phone: None     Gets together: None     Attends Gnosticism service: None     Active member of club or organization: None     Attends meetings of clubs or organizations: None     Relationship status: None    Intimate partner violence     Fear of current or ex partner: None     Emotionally abused: None     Physically abused: None     Forced sexual activity: None   Other Topics Concern    None   Social History Narrative    None     Family History   Problem Relation Age of Onset    Heart Disease Mother     No /HPF    Bacteria, UA NEGATIVE NEGATIVE /HPF    WBC Clumps, UA RARE /HPF    Squam Epithel, UA 1 /HPF    Mucus, UA RARE (A) NEGATIVE HPF    Trichomonas, UA NONE SEEN NONE SEEN /HPF         ED COURSE & MEDICAL DECISION MAKING       Vital signs and nursing notes reviewed during ED course. I have independently evaluated this patient . Supervising MD present in the Emergency Department, available for consultation, throughout entirety of  patient care. Patient presents as above with leaking Corcoran catheter leg bag. He is hypertensive on arrival, afebrile. Abdomen soft and nontender. He has tape applied to the leg bag, apparently hole is above the bag so the entire Corcoran catheter will be replaced here in the emergency department. Urinalysis is negative for bacteria, 208 white blood cells, 25 red blood cells. Urine culture pending. On chart review, recent urine culture grew Klebsiella pneumonia and providencia rettgeri, both organisms sensitive to cefepime which patient was previously on. Unsure if this is a chronic colonization or acute infection. We will plan on holding on further antibiotic treatment for current urine culture. At this point, patient does need urology follow-up for further management and this is discussed at length with patient. He does demonstrate understanding.   ` Suspect rash to left leg associated with a contact dermatitis of patient's leg bag. This is moved to his right leg. We discussed keeping the area dry and discussed skin changes that would be concerning for secondary bacterial infection which she would need to be reevaluated for. Patient to return with any new or worsening symptoms. No vital sign changes raising concern for septicemia. The patient and/or the family were informed of the results of any tests/labs/imaging, the treatment plan, and time was allotted to answer questions. Clinical  IMPRESSION    1.  Complication of Corcoran catheter, initial encounter ([facility] [address] 2. Rash and other nonspecific skin eruption        Comment: Please note this report has been produced using speech recognition software and may contain errors related to that system including errors in grammar, punctuation, and spelling, as well as words and phrases that may be inappropriate. If there are any questions or concerns please feel free to contact the dictating provider for clarification.           SHWETHA Coker  04/16/21 3075

## 2021-04-17 LAB
CULTURE: NORMAL
Lab: NORMAL
SPECIMEN: NORMAL

## 2021-06-18 ENCOUNTER — HOSPITAL ENCOUNTER (EMERGENCY)
Age: 71
Discharge: HOME | End: 2021-06-18
Attending: EMERGENCY MEDICINE
Payer: MEDICARE

## 2021-06-18 VITALS
HEART RATE: 78 BPM | BODY MASS INDEX: 21.47 KG/M2 | RESPIRATION RATE: 18 BRPM | TEMPERATURE: 98.2 F | OXYGEN SATURATION: 98 % | WEIGHT: 150 LBS | DIASTOLIC BLOOD PRESSURE: 89 MMHG | HEIGHT: 70 IN | SYSTOLIC BLOOD PRESSURE: 136 MMHG

## 2021-06-18 DIAGNOSIS — Z65.9 CONCERNED ABOUT HAVING SOCIAL PROBLEM: ICD-10-CM

## 2021-06-18 DIAGNOSIS — T83.9XXA PROBLEM WITH FOLEY CATHETER, INITIAL ENCOUNTER (HCC): Primary | ICD-10-CM

## 2021-06-18 PROCEDURE — 99283 EMERGENCY DEPT VISIT LOW MDM: CPT

## 2021-06-18 NOTE — ED PROVIDER NOTES
No Known Allergies    Nursing Notes Reviewed    Physical Exam:  ED Triage Vitals [06/18/21 0951]   Enc Vitals Group      BP (!) 180/109      Pulse 82      Resp 16      Temp 98.2 °F (36.8 °C)      Temp Source Oral      SpO2 97 %      Weight 150 lb (68 kg)      Height 5' 10\" (1.778 m)      Head Circumference       Peak Flow       Pain Score       Pain Loc       Pain Edu? Excl. in [address]? My pulse ox interpretation is - normal    General appearance:  No acute distress. Skin:  Warm. Dry. Eye:  Extraocular movements intact. Ears, nose, mouth and throat:  Oral mucosa moist   Neck:  Trachea midline. Extremity:  No swelling. Normal ROM     Heart:  Regular rate and rhythm, normal S1 & S2, no extra heart sounds. Perfusion:  intact  Respiratory:  Lungs clear to auscultation bilaterally. Respirations nonlabored. Abdominal: Soft. Non distended. Neurological:  Alert and oriented times 3. Normal gait. Psychiatric:  Appropriate    I have reviewed and interpreted all of the currently available lab results from this visit (if applicable):  No results found for this visit on 06/18/21. Radiographs (if obtained):  Radiologist's Report Reviewed:  No results found. EKG (if obtained): (All EKG's are interpreted by myself in the absence of a cardiologist)      MDM:  26-year-old male with history as above presents with concern that he wants his catheter bag replaced. He appears not to have actually been following up ever since his stroke in December. Initially he told me the bag had not been replaced as well as the Corcoran catheter but he told the nurse that the Corcoran catheter had been replaced 2 weeks ago and he just wants the bag. We were able to give him another back to take home, case management was consulted in order to make sure that he can get appropriate follow-up and that he has the appropriate resources.   He denies other concerns and does not wish to be evaluated for anything else at this time, discharged in stable condition    Clinical Impression:  1. Problem with Corcoran catheter, initial encounter ([address]    2. Concerned about having social problem      Disposition referral (if applicable):  Mira Angelo   [address]  [place] [address] [facility]  [phone]    Schedule an appointment as soon as possible for a visit       Disposition medications (if applicable):  Discharge Medication List as of 6/18/2021 11:28 AM        ED Provider Disposition Time  DISPOSITION Decision To Discharge 06/18/2021 11:18:57 AM      Comment: Please note this report has been produced using speech recognition software and may contain errors related to that system including errors in grammar, punctuation, and spelling, as well as words and phrases that may be inappropriate. Efforts were made to edit the dictations.         Dina Duran MD  06/18/21 5153       Dina Duran MD  06/18/21 5704

## 2021-06-18 NOTE — ED NOTES
The patient refuses a new complete catheter because he had one placed 2 weeks ago, he states. Urine leg bag changed per this nurse. Urine is coming from the intact catheter without complications at this time. A second leg bag is sent home with the patient. He is given a list of doctors to call per the .       Morales Hollis RN  06/18/21 0018

## 2021-08-16 NOTE — CARE COORDINATION
RN  requested that CM speak to patient. Patient has had a catheter since 12/2020 and continues coming in to ED for his supplies. Patient reports that he is having difficulty getting supplies b/c no one will take his insurance. CM went to [address] [place] [address] to speak with patient. Patient reported that he had a stroke and was in [facility] for some time and had a catheter placed due to his prostate. Patient reported that he has been coming in to the ED since 12/2020 for his supplies. CM explained that ED cannot continue giving patient his supplies. Patient needs to get a PCP and the PCP will refer him to a urologist. Urologist will assist patient with getting supplies. Patient became a little frustrated and wanted the ED to change his bag. Patient reported that he wanted ED to take care of patient. CM again explained that patient needs to go to a PCP and get a regular urologist. CM attempted to explain why patient should come in to the ED, but patient cannot continue coming in for free supplies from the hospital. CM gave patient an area list of PCPs' and instructed patient to the [facility] clinic @ [address], [place], [address] @ [phone]. Patient to go to [facility] to get assistance with catheter. CM also wrote a note for [facility] that patient needs referral for a PCP and urologist in order that patient can get his catheter needs met and his supplies. Patient then reported that he did not have transportation to the [facility]. CM called [employer] @ [phone]. [facility] to come to ED and take patient to [facility]. [facility] has bus line close by and patient can take bus back to his residence. Patient needs to go to his PCP/Walk-in clinic for catheter needs and Walk-in clinic will get patient a Urologist so patient can follow-up on his catheter care.  Patient Cuco Ellison continue coming to ED for free catheter supplies (CM explained to patient) Patient needs to follow-up with urologist for these needs. Patient to not get free supplies from ED or [employer]. Explained to patient that patient needs to bring money for [employer] should patient return to ED.